# Patient Record
Sex: MALE | Race: WHITE | NOT HISPANIC OR LATINO | Employment: FULL TIME | URBAN - METROPOLITAN AREA
[De-identification: names, ages, dates, MRNs, and addresses within clinical notes are randomized per-mention and may not be internally consistent; named-entity substitution may affect disease eponyms.]

---

## 2021-01-27 ENCOUNTER — TELEPHONE (OUTPATIENT)
Dept: RHEUMATOLOGY | Facility: CLINIC | Age: 51
End: 2021-01-27

## 2021-02-24 ENCOUNTER — OFFICE VISIT (OUTPATIENT)
Dept: RHEUMATOLOGY | Facility: CLINIC | Age: 51
End: 2021-02-24
Payer: COMMERCIAL

## 2021-02-24 ENCOUNTER — HOSPITAL ENCOUNTER (OUTPATIENT)
Dept: RADIOLOGY | Facility: HOSPITAL | Age: 51
Discharge: HOME | End: 2021-02-24
Payer: COMMERCIAL

## 2021-02-24 VITALS — SYSTOLIC BLOOD PRESSURE: 110 MMHG | HEART RATE: 94 BPM | WEIGHT: 258.63 LBS | DIASTOLIC BLOOD PRESSURE: 74 MMHG

## 2021-02-24 DIAGNOSIS — M25.50 ARTHRALGIA, UNSPECIFIED JOINT: Primary | ICD-10-CM

## 2021-02-24 DIAGNOSIS — M25.50 ARTHRALGIA, UNSPECIFIED JOINT: ICD-10-CM

## 2021-02-24 DIAGNOSIS — M06.9 RHEUMATOID ARTHRITIS, INVOLVING UNSPECIFIED SITE, UNSPECIFIED WHETHER RHEUMATOID FACTOR PRESENT: ICD-10-CM

## 2021-02-24 PROBLEM — E78.5 DYSLIPIDEMIA: Status: ACTIVE | Noted: 2020-05-20

## 2021-02-24 PROBLEM — I10 ESSENTIAL HYPERTENSION: Status: ACTIVE | Noted: 2020-05-20

## 2021-02-24 PROCEDURE — 73560 X-RAY EXAM OF KNEE 1 OR 2: CPT | Mod: TC,50

## 2021-02-24 PROCEDURE — 73070 XR ELBOW 2 VIEWS LEFT: ICD-10-PCS | Mod: 26,LT,, | Performed by: RADIOLOGY

## 2021-02-24 PROCEDURE — 73560 X-RAY EXAM OF KNEE 1 OR 2: CPT | Mod: 26,,, | Performed by: RADIOLOGY

## 2021-02-24 PROCEDURE — 99204 PR OFFICE/OUTPT VISIT, NEW, LEVL IV, 45-59 MIN: ICD-10-PCS | Mod: S$GLB,,,

## 2021-02-24 PROCEDURE — 99204 OFFICE O/P NEW MOD 45 MIN: CPT | Mod: S$GLB,,,

## 2021-02-24 PROCEDURE — 73560 XR KNEE 1 OR 2 VIEW BILATERAL: ICD-10-PCS | Mod: 26,,, | Performed by: RADIOLOGY

## 2021-02-24 PROCEDURE — 73030 X-RAY EXAM OF SHOULDER: CPT | Mod: 26,,, | Performed by: RADIOLOGY

## 2021-02-24 PROCEDURE — 73130 X-RAY EXAM OF HAND: CPT | Mod: TC,50

## 2021-02-24 PROCEDURE — 73130 XR HAND COMPLETE 3 VIEWS BILATERAL: ICD-10-PCS | Mod: 26,,, | Performed by: RADIOLOGY

## 2021-02-24 PROCEDURE — 99999 PR PBB SHADOW E&M-EST. PATIENT-LVL III: CPT | Mod: PBBFAC,,,

## 2021-02-24 PROCEDURE — 73030 X-RAY EXAM OF SHOULDER: CPT | Mod: TC,50

## 2021-02-24 PROCEDURE — 99999 PR PBB SHADOW E&M-EST. PATIENT-LVL III: ICD-10-PCS | Mod: PBBFAC,,,

## 2021-02-24 PROCEDURE — 73130 X-RAY EXAM OF HAND: CPT | Mod: 26,,, | Performed by: RADIOLOGY

## 2021-02-24 PROCEDURE — 73070 X-RAY EXAM OF ELBOW: CPT | Mod: TC,LT

## 2021-02-24 PROCEDURE — 73070 X-RAY EXAM OF ELBOW: CPT | Mod: 26,LT,, | Performed by: RADIOLOGY

## 2021-02-24 PROCEDURE — 73030 XR SHOULDER COMPLETE 2 OR MORE VIEWS BILATERAL: ICD-10-PCS | Mod: 26,,, | Performed by: RADIOLOGY

## 2021-02-24 RX ORDER — PREDNISONE 10 MG/1
10 TABLET ORAL DAILY
Qty: 60 TABLET | Refills: 1 | Status: SHIPPED | OUTPATIENT
Start: 2021-02-24 | End: 2021-07-05 | Stop reason: SDUPTHER

## 2021-02-24 RX ORDER — VENLAFAXINE 37.5 MG/1
37.5 TABLET ORAL
COMMUNITY

## 2021-02-24 RX ORDER — ATORVASTATIN CALCIUM 20 MG/1
TABLET, FILM COATED ORAL
COMMUNITY

## 2021-02-24 RX ORDER — TELMISARTAN AND HYDROCHLORTHIAZIDE 80; 25 MG/1; MG/1
TABLET ORAL
COMMUNITY

## 2021-02-24 RX ORDER — AMLODIPINE BESYLATE 5 MG/1
TABLET ORAL
COMMUNITY
End: 2023-02-15 | Stop reason: DRUGHIGH

## 2021-02-24 ASSESSMENT — ROUTINE ASSESSMENT OF PATIENT INDEX DATA (RAPID3)
PATIENT GLOBAL ASSESSMENT SCORE: 5
TOTAL RAPID3 SCORE: 5.22
PAIN SCORE: 7
PSYCHOLOGICAL DISTRESS SCORE: 4.4
MDHAQ FUNCTION SCORE: 1.1
FATIGUE SCORE: 7
AM STIFFNESS SCORE: 1, YES

## 2021-02-26 ENCOUNTER — LAB VISIT (OUTPATIENT)
Dept: LAB | Facility: HOSPITAL | Age: 51
End: 2021-02-26
Payer: COMMERCIAL

## 2021-02-26 ENCOUNTER — PATIENT MESSAGE (OUTPATIENT)
Dept: RHEUMATOLOGY | Facility: CLINIC | Age: 51
End: 2021-02-26

## 2021-02-26 DIAGNOSIS — M25.50 ARTHRALGIA, UNSPECIFIED JOINT: Primary | ICD-10-CM

## 2021-02-26 DIAGNOSIS — M25.50 ARTHRALGIA, UNSPECIFIED JOINT: ICD-10-CM

## 2021-02-26 LAB
ALBUMIN SERPL BCP-MCNC: 4.8 G/DL (ref 3.5–5.2)
ALP SERPL-CCNC: 61 U/L (ref 55–135)
ALT SERPL W/O P-5'-P-CCNC: 28 U/L (ref 10–44)
ANION GAP SERPL CALC-SCNC: 11 MMOL/L (ref 8–16)
AST SERPL-CCNC: 27 U/L (ref 10–40)
BASOPHILS # BLD AUTO: 0.05 K/UL (ref 0–0.2)
BASOPHILS NFR BLD: 0.6 % (ref 0–1.9)
BILIRUB SERPL-MCNC: 1.3 MG/DL (ref 0.1–1)
BUN SERPL-MCNC: 13 MG/DL (ref 6–20)
CALCIUM SERPL-MCNC: 9.6 MG/DL (ref 8.7–10.5)
CHLORIDE SERPL-SCNC: 101 MMOL/L (ref 95–110)
CK SERPL-CCNC: 239 U/L (ref 20–200)
CO2 SERPL-SCNC: 24 MMOL/L (ref 23–29)
CREAT SERPL-MCNC: 0.7 MG/DL (ref 0.5–1.4)
CRP SERPL-MCNC: 0.21 MG/DL (ref 0–0.75)
DIFFERENTIAL METHOD: NORMAL
EOSINOPHIL # BLD AUTO: 0.2 K/UL (ref 0–0.5)
EOSINOPHIL NFR BLD: 2.2 % (ref 0–8)
ERYTHROCYTE [DISTWIDTH] IN BLOOD BY AUTOMATED COUNT: 13.1 % (ref 11.5–14.5)
ERYTHROCYTE [SEDIMENTATION RATE] IN BLOOD BY WESTERGREN METHOD: 3 MM/HR (ref 0–10)
EST. GFR  (AFRICAN AMERICAN): >60 ML/MIN/1.73 M^2
EST. GFR  (NON AFRICAN AMERICAN): >60 ML/MIN/1.73 M^2
GLUCOSE SERPL-MCNC: 129 MG/DL (ref 70–110)
HCT VFR BLD AUTO: 46.6 % (ref 40–54)
HGB BLD-MCNC: 15.8 G/DL (ref 14–18)
IMM GRANULOCYTES # BLD AUTO: 0.02 K/UL (ref 0–0.04)
IMM GRANULOCYTES NFR BLD AUTO: 0.2 % (ref 0–0.5)
LYMPHOCYTES # BLD AUTO: 1.8 K/UL (ref 1–4.8)
LYMPHOCYTES NFR BLD: 22 % (ref 18–48)
MCH RBC QN AUTO: 30 PG (ref 27–31)
MCHC RBC AUTO-ENTMCNC: 33.9 G/DL (ref 32–36)
MCV RBC AUTO: 88 FL (ref 82–98)
MONOCYTES # BLD AUTO: 0.7 K/UL (ref 0.3–1)
MONOCYTES NFR BLD: 8.9 % (ref 4–15)
NEUTROPHILS # BLD AUTO: 5.5 K/UL (ref 1.8–7.7)
NEUTROPHILS NFR BLD: 66.1 % (ref 38–73)
NRBC BLD-RTO: 0 /100 WBC
PLATELET # BLD AUTO: 244 K/UL (ref 150–350)
PMV BLD AUTO: 11.5 FL (ref 9.2–12.9)
POTASSIUM SERPL-SCNC: 3.5 MMOL/L (ref 3.5–5.1)
PROT SERPL-MCNC: 8.1 G/DL (ref 6–8.4)
RBC # BLD AUTO: 5.27 M/UL (ref 4.6–6.2)
SODIUM SERPL-SCNC: 136 MMOL/L (ref 136–145)
WBC # BLD AUTO: 8.32 K/UL (ref 3.9–12.7)

## 2021-02-26 PROCEDURE — 86803 HEPATITIS C AB TEST: CPT

## 2021-02-26 PROCEDURE — 86703 HIV-1/HIV-2 1 RESULT ANTBDY: CPT

## 2021-02-26 PROCEDURE — 86038 ANTINUCLEAR ANTIBODIES: CPT

## 2021-02-26 PROCEDURE — 87340 HEPATITIS B SURFACE AG IA: CPT

## 2021-02-26 PROCEDURE — 86592 SYPHILIS TEST NON-TREP QUAL: CPT

## 2021-02-26 PROCEDURE — 86140 C-REACTIVE PROTEIN: CPT

## 2021-02-26 PROCEDURE — 86790 VIRUS ANTIBODY NOS: CPT

## 2021-02-26 PROCEDURE — 86706 HEP B SURFACE ANTIBODY: CPT

## 2021-02-26 PROCEDURE — 86682 HELMINTH ANTIBODY: CPT

## 2021-02-26 PROCEDURE — 86225 DNA ANTIBODY NATIVE: CPT

## 2021-02-26 PROCEDURE — 86704 HEP B CORE ANTIBODY TOTAL: CPT

## 2021-02-26 PROCEDURE — 86431 RHEUMATOID FACTOR QUANT: CPT

## 2021-02-26 PROCEDURE — 86039 ANTINUCLEAR ANTIBODIES (ANA): CPT

## 2021-02-26 PROCEDURE — 80053 COMPREHEN METABOLIC PANEL: CPT

## 2021-02-26 PROCEDURE — 85025 COMPLETE CBC W/AUTO DIFF WBC: CPT

## 2021-02-26 PROCEDURE — 86200 CCP ANTIBODY: CPT

## 2021-02-26 PROCEDURE — 82085 ASSAY OF ALDOLASE: CPT

## 2021-02-26 PROCEDURE — 86787 VARICELLA-ZOSTER ANTIBODY: CPT

## 2021-02-26 PROCEDURE — 86235 NUCLEAR ANTIGEN ANTIBODY: CPT | Mod: 59

## 2021-02-26 PROCEDURE — 85651 RBC SED RATE NONAUTOMATED: CPT

## 2021-02-26 PROCEDURE — 86160 COMPLEMENT ANTIGEN: CPT

## 2021-02-26 PROCEDURE — 86160 COMPLEMENT ANTIGEN: CPT | Mod: 59

## 2021-02-26 PROCEDURE — 82550 ASSAY OF CK (CPK): CPT

## 2021-02-27 LAB
C3 SERPL-MCNC: 140 MG/DL (ref 50–180)
C4 SERPL-MCNC: 29 MG/DL (ref 11–44)
CCP AB SER IA-ACNC: <0.5 U/ML
RHEUMATOID FACT SERPL-ACNC: <10 IU/ML (ref 0–15)
RPR SER QL: NORMAL
VARICELLA INTERPRETATION: POSITIVE
VARICELLA ZOSTER IGG: 3.84 ISR (ref 0–0.9)

## 2021-03-01 LAB
ANA PATTERN 1: NORMAL
ANA SER QL IF: POSITIVE
ANA TITR SER IF: NORMAL {TITER}
DSDNA AB SER-ACNC: NORMAL [IU]/ML

## 2021-03-02 LAB
HBV CORE AB SERPL QL IA: NEGATIVE
HBV SURFACE AB SER-ACNC: NEGATIVE M[IU]/ML
HBV SURFACE AG SERPL QL IA: NEGATIVE
HCV AB SERPL QL IA: NEGATIVE
HEPATITIS A ANTIBODY, IGG: NEGATIVE
STRONGYLOIDES ANTIBODY IGG: POSITIVE

## 2021-03-03 LAB
ALDOLASE SERPL-CCNC: 4.8 U/L (ref 1.2–7.6)
ANTI SM ANTIBODY: 0.13 RATIO (ref 0–0.99)
ANTI SM/RNP ANTIBODY: 0.12 RATIO (ref 0–0.99)
ANTI-SM INTERPRETATION: NEGATIVE
ANTI-SM/RNP INTERPRETATION: NEGATIVE
ANTI-SSA ANTIBODY: 0.08 RATIO (ref 0–0.99)
ANTI-SSA INTERPRETATION: NEGATIVE
ANTI-SSB ANTIBODY: 0.3 RATIO (ref 0–0.99)
ANTI-SSB INTERPRETATION: NEGATIVE
DSDNA AB SER-ACNC: NORMAL [IU]/ML
HIV 1+2 AB+HIV1 P24 AG SERPL QL IA: NEGATIVE

## 2021-03-08 ENCOUNTER — PATIENT MESSAGE (OUTPATIENT)
Dept: RHEUMATOLOGY | Facility: CLINIC | Age: 51
End: 2021-03-08

## 2021-03-12 ENCOUNTER — HOSPITAL ENCOUNTER (OUTPATIENT)
Dept: RADIOLOGY | Facility: HOSPITAL | Age: 51
Discharge: HOME | End: 2021-03-12
Payer: COMMERCIAL

## 2021-03-12 ENCOUNTER — OFFICE VISIT (OUTPATIENT)
Dept: RHEUMATOLOGY | Facility: CLINIC | Age: 51
End: 2021-03-12
Payer: COMMERCIAL

## 2021-03-12 VITALS
HEART RATE: 97 BPM | HEIGHT: 66 IN | BODY MASS INDEX: 40.89 KG/M2 | SYSTOLIC BLOOD PRESSURE: 127 MMHG | WEIGHT: 254.44 LBS | DIASTOLIC BLOOD PRESSURE: 86 MMHG

## 2021-03-12 DIAGNOSIS — M06.9 RHEUMATOID ARTHRITIS, INVOLVING UNSPECIFIED SITE, UNSPECIFIED WHETHER RHEUMATOID FACTOR PRESENT: ICD-10-CM

## 2021-03-12 PROCEDURE — 99999 PR PBB SHADOW E&M-EST. PATIENT-LVL IV: CPT | Mod: PBBFAC,,,

## 2021-03-12 PROCEDURE — 71046 X-RAY EXAM CHEST 2 VIEWS: CPT | Mod: 26,,, | Performed by: RADIOLOGY

## 2021-03-12 PROCEDURE — 71046 X-RAY EXAM CHEST 2 VIEWS: CPT | Mod: TC,FY

## 2021-03-12 PROCEDURE — 99214 PR OFFICE/OUTPT VISIT, EST, LEVL IV, 30-39 MIN: ICD-10-PCS | Mod: S$GLB,,,

## 2021-03-12 PROCEDURE — 99999 PR PBB SHADOW E&M-EST. PATIENT-LVL IV: ICD-10-PCS | Mod: PBBFAC,,,

## 2021-03-12 PROCEDURE — 99214 OFFICE O/P EST MOD 30 MIN: CPT | Mod: S$GLB,,,

## 2021-03-12 PROCEDURE — 71046 XR CHEST PA AND LATERAL: ICD-10-PCS | Mod: 26,,, | Performed by: RADIOLOGY

## 2021-03-12 RX ORDER — METHOTREXATE 2.5 MG/1
10 TABLET ORAL
Qty: 16 TABLET | Refills: 3 | Status: SHIPPED | OUTPATIENT
Start: 2021-03-12 | End: 2021-04-29

## 2021-03-12 RX ORDER — IVERMECTIN 3 MG/1
200 TABLET ORAL ONCE
Qty: 8 TABLET | Refills: 0 | Status: SHIPPED | OUTPATIENT
Start: 2021-03-12 | End: 2021-03-12

## 2021-03-12 RX ORDER — FOLIC ACID 1 MG/1
1 TABLET ORAL DAILY
Qty: 90 TABLET | Refills: 3 | Status: SHIPPED | OUTPATIENT
Start: 2021-03-12 | End: 2022-03-24 | Stop reason: SDUPTHER

## 2021-03-12 RX ORDER — FLUTICASONE PROPIONATE 50 MCG
SPRAY, SUSPENSION (ML) NASAL
COMMUNITY

## 2021-03-12 ASSESSMENT — ROUTINE ASSESSMENT OF PATIENT INDEX DATA (RAPID3)
TOTAL RAPID3 SCORE: 4.5
PAIN SCORE: 5.5
WHEN YOU AWAKENED IN THE MORNING OVER THE LAST WEEK, PLEASE INDICATE THE AMOUNT OF TIME IT TAKES UNTIL YOU ARE AS LIMBER AS YOU WILL BE FOR THE DAY: 30 MINUTES
PSYCHOLOGICAL DISTRESS SCORE: 3.3
FATIGUE SCORE: 7
MDHAQ FUNCTION SCORE: 0.9
AM STIFFNESS SCORE: 1, YES
PATIENT GLOBAL ASSESSMENT SCORE: 5

## 2021-03-29 ENCOUNTER — PATIENT MESSAGE (OUTPATIENT)
Dept: RHEUMATOLOGY | Facility: CLINIC | Age: 51
End: 2021-03-29

## 2021-03-30 ENCOUNTER — PATIENT MESSAGE (OUTPATIENT)
Dept: RHEUMATOLOGY | Facility: CLINIC | Age: 51
End: 2021-03-30

## 2021-03-30 ENCOUNTER — TELEPHONE (OUTPATIENT)
Dept: RHEUMATOLOGY | Facility: CLINIC | Age: 51
End: 2021-03-30

## 2021-03-30 DIAGNOSIS — M60.9 MYOSITIS, UNSPECIFIED MYOSITIS TYPE, UNSPECIFIED SITE: ICD-10-CM

## 2021-04-12 ENCOUNTER — LAB VISIT (OUTPATIENT)
Dept: FAMILY MEDICINE | Facility: CLINIC | Age: 51
End: 2021-04-12
Payer: COMMERCIAL

## 2021-04-12 DIAGNOSIS — M60.9 MYOSITIS, UNSPECIFIED MYOSITIS TYPE, UNSPECIFIED SITE: ICD-10-CM

## 2021-04-12 PROCEDURE — U0005 INFEC AGEN DETEC AMPLI PROBE: HCPCS

## 2021-04-12 PROCEDURE — U0003 INFECTIOUS AGENT DETECTION BY NUCLEIC ACID (DNA OR RNA); SEVERE ACUTE RESPIRATORY SYNDROME CORONAVIRUS 2 (SARS-COV-2) (CORONAVIRUS DISEASE [COVID-19]), AMPLIFIED PROBE TECHNIQUE, MAKING USE OF HIGH THROUGHPUT TECHNOLOGIES AS DESCRIBED BY CMS-2020-01-R: HCPCS

## 2021-04-13 LAB — SARS-COV-2 RNA RESP QL NAA+PROBE: NOT DETECTED

## 2021-04-14 ENCOUNTER — HOSPITAL ENCOUNTER (OUTPATIENT)
Dept: RADIOLOGY | Facility: HOSPITAL | Age: 51
Discharge: HOME | End: 2021-04-14
Payer: COMMERCIAL

## 2021-04-14 ENCOUNTER — HOSPITAL ENCOUNTER (OUTPATIENT)
Dept: PULMONOLOGY | Facility: CLINIC | Age: 51
Discharge: HOME | End: 2021-04-14
Payer: COMMERCIAL

## 2021-04-14 DIAGNOSIS — M60.9 MYOSITIS, UNSPECIFIED MYOSITIS TYPE, UNSPECIFIED SITE: ICD-10-CM

## 2021-04-14 PROCEDURE — 94729 PR C02/MEMBANE DIFFUSE CAPACITY: ICD-10-PCS | Mod: S$GLB,,, | Performed by: INTERNAL MEDICINE

## 2021-04-14 PROCEDURE — 94010 BREATHING CAPACITY TEST: CPT | Mod: S$GLB,,, | Performed by: INTERNAL MEDICINE

## 2021-04-14 PROCEDURE — 71250 CT CHEST WITHOUT CONTRAST: ICD-10-PCS | Mod: 26,,, | Performed by: RADIOLOGY

## 2021-04-14 PROCEDURE — 73718 MRI LOWER EXTREMITY W/O DYE: CPT | Mod: 26,RT,, | Performed by: RADIOLOGY

## 2021-04-14 PROCEDURE — 73718 MRI FEMUR WITHOUT CONTRAST LEFT: ICD-10-PCS | Mod: 26,LT,, | Performed by: RADIOLOGY

## 2021-04-14 PROCEDURE — 71250 CT THORAX DX C-: CPT | Mod: TC

## 2021-04-14 PROCEDURE — 73718 MRI LOWER EXTREMITY W/O DYE: CPT | Mod: TC,RT

## 2021-04-14 PROCEDURE — 94010 BREATHING CAPACITY TEST: ICD-10-PCS | Mod: S$GLB,,, | Performed by: INTERNAL MEDICINE

## 2021-04-14 PROCEDURE — 94727 PR PULM FUNCTION TEST BY GAS: ICD-10-PCS | Mod: S$GLB,,, | Performed by: INTERNAL MEDICINE

## 2021-04-14 PROCEDURE — 71250 CT THORAX DX C-: CPT | Mod: 26,,, | Performed by: RADIOLOGY

## 2021-04-14 PROCEDURE — 94729 DIFFUSING CAPACITY: CPT | Mod: S$GLB,,, | Performed by: INTERNAL MEDICINE

## 2021-04-14 PROCEDURE — 73718 MRI FEMUR WITHOUT CONTRAST RIGHT: ICD-10-PCS | Mod: 26,RT,, | Performed by: RADIOLOGY

## 2021-04-14 PROCEDURE — 94727 GAS DIL/WSHOT DETER LNG VOL: CPT | Mod: S$GLB,,, | Performed by: INTERNAL MEDICINE

## 2021-04-14 PROCEDURE — 73718 MRI LOWER EXTREMITY W/O DYE: CPT | Mod: 26,LT,, | Performed by: RADIOLOGY

## 2021-04-14 PROCEDURE — 73718 MRI LOWER EXTREMITY W/O DYE: CPT | Mod: TC,LT

## 2021-04-15 ENCOUNTER — PATIENT MESSAGE (OUTPATIENT)
Dept: RHEUMATOLOGY | Facility: CLINIC | Age: 51
End: 2021-04-15

## 2021-04-15 DIAGNOSIS — M25.50 ARTHRALGIA, UNSPECIFIED JOINT: ICD-10-CM

## 2021-04-15 DIAGNOSIS — M06.9 RHEUMATOID ARTHRITIS, INVOLVING UNSPECIFIED SITE, UNSPECIFIED WHETHER RHEUMATOID FACTOR PRESENT: ICD-10-CM

## 2021-04-15 DIAGNOSIS — M60.9 MYOSITIS, UNSPECIFIED MYOSITIS TYPE, UNSPECIFIED SITE: Primary | ICD-10-CM

## 2021-04-21 ENCOUNTER — TELEPHONE (OUTPATIENT)
Dept: RHEUMATOLOGY | Facility: CLINIC | Age: 51
End: 2021-04-21

## 2021-05-10 ENCOUNTER — PATIENT MESSAGE (OUTPATIENT)
Dept: RHEUMATOLOGY | Facility: CLINIC | Age: 51
End: 2021-05-10

## 2021-05-20 ENCOUNTER — TELEPHONE (OUTPATIENT)
Dept: RHEUMATOLOGY | Facility: CLINIC | Age: 51
End: 2021-05-20

## 2021-06-02 ENCOUNTER — PATIENT MESSAGE (OUTPATIENT)
Dept: RHEUMATOLOGY | Facility: CLINIC | Age: 51
End: 2021-06-02

## 2021-06-11 ENCOUNTER — OFFICE VISIT (OUTPATIENT)
Dept: RHEUMATOLOGY | Facility: CLINIC | Age: 51
End: 2021-06-11
Payer: COMMERCIAL

## 2021-06-11 ENCOUNTER — LAB VISIT (OUTPATIENT)
Dept: LAB | Facility: HOSPITAL | Age: 51
End: 2021-06-11
Payer: COMMERCIAL

## 2021-06-11 VITALS
SYSTOLIC BLOOD PRESSURE: 121 MMHG | DIASTOLIC BLOOD PRESSURE: 79 MMHG | WEIGHT: 260 LBS | HEART RATE: 97 BPM | BODY MASS INDEX: 40.81 KG/M2 | HEIGHT: 67 IN

## 2021-06-11 DIAGNOSIS — M06.9 RHEUMATOID ARTHRITIS, INVOLVING UNSPECIFIED SITE, UNSPECIFIED WHETHER RHEUMATOID FACTOR PRESENT: ICD-10-CM

## 2021-06-11 DIAGNOSIS — M13.0 POLYARTHRITIS: ICD-10-CM

## 2021-06-11 DIAGNOSIS — M13.0 POLYARTHRITIS: Primary | ICD-10-CM

## 2021-06-11 LAB
ALBUMIN SERPL BCP-MCNC: 4.7 G/DL (ref 3.5–5.2)
ALP SERPL-CCNC: 77 U/L (ref 55–135)
ALT SERPL W/O P-5'-P-CCNC: 30 U/L (ref 10–44)
ANION GAP SERPL CALC-SCNC: 15 MMOL/L (ref 8–16)
AST SERPL-CCNC: 22 U/L (ref 10–40)
BASOPHILS # BLD AUTO: 0.04 K/UL (ref 0–0.2)
BASOPHILS NFR BLD: 0.4 % (ref 0–1.9)
BILIRUB SERPL-MCNC: 1.3 MG/DL (ref 0.1–1)
BUN SERPL-MCNC: 11 MG/DL (ref 6–20)
C3 SERPL-MCNC: 140 MG/DL (ref 50–180)
C4 SERPL-MCNC: 29 MG/DL (ref 11–44)
CALCIUM SERPL-MCNC: 10.3 MG/DL (ref 8.7–10.5)
CHLORIDE SERPL-SCNC: 103 MMOL/L (ref 95–110)
CK SERPL-CCNC: 181 U/L (ref 20–200)
CO2 SERPL-SCNC: 21 MMOL/L (ref 23–29)
CREAT SERPL-MCNC: 1 MG/DL (ref 0.5–1.4)
CRP SERPL-MCNC: 3.5 MG/L (ref 0–8.2)
DAT IGG-SP REAG RBC-IMP: NORMAL
DIFFERENTIAL METHOD: ABNORMAL
EOSINOPHIL # BLD AUTO: 0 K/UL (ref 0–0.5)
EOSINOPHIL NFR BLD: 0.1 % (ref 0–8)
ERYTHROCYTE [DISTWIDTH] IN BLOOD BY AUTOMATED COUNT: 14 % (ref 11.5–14.5)
ERYTHROCYTE [SEDIMENTATION RATE] IN BLOOD BY WESTERGREN METHOD: 26 MM/HR (ref 0–23)
EST. GFR  (AFRICAN AMERICAN): >60 ML/MIN/1.73 M^2
EST. GFR  (NON AFRICAN AMERICAN): >60 ML/MIN/1.73 M^2
GLUCOSE SERPL-MCNC: 132 MG/DL (ref 70–110)
HCT VFR BLD AUTO: 48.2 % (ref 40–54)
HGB BLD-MCNC: 16 G/DL (ref 14–18)
IMM GRANULOCYTES # BLD AUTO: 0.04 K/UL (ref 0–0.04)
IMM GRANULOCYTES NFR BLD AUTO: 0.4 % (ref 0–0.5)
LYMPHOCYTES # BLD AUTO: 0.8 K/UL (ref 1–4.8)
LYMPHOCYTES NFR BLD: 6.9 % (ref 18–48)
MCH RBC QN AUTO: 29.9 PG (ref 27–31)
MCHC RBC AUTO-ENTMCNC: 33.2 G/DL (ref 32–36)
MCV RBC AUTO: 90 FL (ref 82–98)
MONOCYTES # BLD AUTO: 0.7 K/UL (ref 0.3–1)
MONOCYTES NFR BLD: 6.3 % (ref 4–15)
NEUTROPHILS # BLD AUTO: 9.3 K/UL (ref 1.8–7.7)
NEUTROPHILS NFR BLD: 85.9 % (ref 38–73)
NRBC BLD-RTO: 0 /100 WBC
PLATELET # BLD AUTO: 245 K/UL (ref 150–450)
PMV BLD AUTO: 11.8 FL (ref 9.2–12.9)
POTASSIUM SERPL-SCNC: 3.9 MMOL/L (ref 3.5–5.1)
PROT SERPL-MCNC: 8.1 G/DL (ref 6–8.4)
RBC # BLD AUTO: 5.35 M/UL (ref 4.6–6.2)
SODIUM SERPL-SCNC: 139 MMOL/L (ref 136–145)
WBC # BLD AUTO: 10.81 K/UL (ref 3.9–12.7)

## 2021-06-11 PROCEDURE — 80053 COMPREHEN METABOLIC PANEL: CPT

## 2021-06-11 PROCEDURE — 99214 OFFICE O/P EST MOD 30 MIN: CPT | Mod: 25,S$GLB,, | Performed by: INTERNAL MEDICINE

## 2021-06-11 PROCEDURE — 99214 PR OFFICE/OUTPT VISIT, EST, LEVL IV, 30-39 MIN: ICD-10-PCS | Mod: 25,S$GLB,, | Performed by: INTERNAL MEDICINE

## 2021-06-11 PROCEDURE — 36415 COLL VENOUS BLD VENIPUNCTURE: CPT

## 2021-06-11 PROCEDURE — 86140 C-REACTIVE PROTEIN: CPT

## 2021-06-11 PROCEDURE — 82550 ASSAY OF CK (CPK): CPT

## 2021-06-11 PROCEDURE — 82085 ASSAY OF ALDOLASE: CPT

## 2021-06-11 PROCEDURE — 86160 COMPLEMENT ANTIGEN: CPT

## 2021-06-11 PROCEDURE — 86160 COMPLEMENT ANTIGEN: CPT | Mod: 59

## 2021-06-11 PROCEDURE — 99999 PR PBB SHADOW E&M-EST. PATIENT-LVL III: CPT | Mod: PBBFAC,,,

## 2021-06-11 PROCEDURE — 96372 PR INJECTION,THERAP/PROPH/DIAG2ST, IM OR SUBCUT: ICD-10-PCS | Mod: S$GLB,,, | Performed by: INTERNAL MEDICINE

## 2021-06-11 PROCEDURE — 85613 RUSSELL VIPER VENOM DILUTED: CPT

## 2021-06-11 PROCEDURE — 86880 COOMBS TEST DIRECT: CPT

## 2021-06-11 PROCEDURE — 85652 RBC SED RATE AUTOMATED: CPT

## 2021-06-11 PROCEDURE — 86147 CARDIOLIPIN ANTIBODY EA IG: CPT

## 2021-06-11 PROCEDURE — 86146 BETA-2 GLYCOPROTEIN ANTIBODY: CPT | Mod: 59

## 2021-06-11 PROCEDURE — 96372 THER/PROPH/DIAG INJ SC/IM: CPT | Mod: S$GLB,,, | Performed by: INTERNAL MEDICINE

## 2021-06-11 PROCEDURE — 85025 COMPLETE CBC W/AUTO DIFF WBC: CPT

## 2021-06-11 PROCEDURE — 99999 PR PBB SHADOW E&M-EST. PATIENT-LVL III: ICD-10-PCS | Mod: PBBFAC,,,

## 2021-06-11 PROCEDURE — 86225 DNA ANTIBODY NATIVE: CPT

## 2021-06-11 RX ORDER — METHOTREXATE 2.5 MG/1
TABLET ORAL
Qty: 52 TABLET | Refills: 0 | Status: SHIPPED | OUTPATIENT
Start: 2021-06-11 | End: 2021-07-15

## 2021-06-11 RX ORDER — TRIAMCINOLONE ACETONIDE 40 MG/ML
40 INJECTION, SUSPENSION INTRA-ARTICULAR; INTRAMUSCULAR ONCE
Status: COMPLETED | OUTPATIENT
Start: 2021-06-11 | End: 2021-06-11

## 2021-06-11 RX ADMIN — TRIAMCINOLONE ACETONIDE 40 MG: 40 INJECTION, SUSPENSION INTRA-ARTICULAR; INTRAMUSCULAR at 10:06

## 2021-06-11 ASSESSMENT — ROUTINE ASSESSMENT OF PATIENT INDEX DATA (RAPID3)
PATIENT GLOBAL ASSESSMENT SCORE: 5
WHEN YOU AWAKENED IN THE MORNING OVER THE LAST WEEK, PLEASE INDICATE THE AMOUNT OF TIME IT TAKES UNTIL YOU ARE AS LIMBER AS YOU WILL BE FOR THE DAY: 1
TOTAL RAPID3 SCORE: 4.89
FATIGUE SCORE: 5
PSYCHOLOGICAL DISTRESS SCORE: 6.6
MDHAQ FUNCTION SCORE: 1.4
PAIN SCORE: 5
AM STIFFNESS SCORE: 1, YES

## 2021-06-14 LAB
ALDOLASE SERPL-CCNC: 2.7 U/L (ref 1.2–7.6)
CARDIOLIPIN IGG SER IA-ACNC: <9.4 GPL (ref 0–14.99)
CARDIOLIPIN IGM SER IA-ACNC: <9.4 MPL (ref 0–12.49)
DSDNA AB SER-ACNC: NORMAL [IU]/ML

## 2021-06-15 LAB
APTT IMM NP PPP: NORMAL SEC (ref 32–48)
APTT P HEP NEUT PPP: NORMAL SEC (ref 32–48)
B2 GLYCOPROT1 IGA SER QL: <9 SAU
B2 GLYCOPROT1 IGG SER QL: <9 SGU
B2 GLYCOPROT1 IGM SER QL: <9 SMU
CONFIRM APTT STACLOT: NORMAL
DRVVT SCREEN TO CONFIRM RATIO: NORMAL RATIO
LA 3 SCREEN W REFLEX-IMP: NORMAL
LA NT DPL PPP QL: NORMAL
MIXING DRVVT: NORMAL SEC (ref 33–44)
PROTHROMBIN TIME: 12.6 SEC (ref 12–15.5)
REPTILASE TIME: NORMAL SEC
SCREEN APTT: 41 SEC (ref 32–48)
SCREEN DRVVT: 34 SEC (ref 33–44)
THROMBIN TIME: NORMAL SEC (ref 14.7–19.5)

## 2021-07-06 RX ORDER — PREDNISONE 10 MG/1
10 TABLET ORAL DAILY
Qty: 60 TABLET | Refills: 1 | Status: SHIPPED | OUTPATIENT
Start: 2021-07-06 | End: 2021-07-21

## 2021-07-21 ENCOUNTER — OFFICE VISIT (OUTPATIENT)
Dept: RHEUMATOLOGY | Facility: CLINIC | Age: 51
End: 2021-07-21
Payer: COMMERCIAL

## 2021-07-21 ENCOUNTER — LAB VISIT (OUTPATIENT)
Dept: LAB | Facility: HOSPITAL | Age: 51
End: 2021-07-21
Payer: COMMERCIAL

## 2021-07-21 VITALS
SYSTOLIC BLOOD PRESSURE: 115 MMHG | DIASTOLIC BLOOD PRESSURE: 76 MMHG | WEIGHT: 260.13 LBS | BODY MASS INDEX: 40.5 KG/M2 | HEART RATE: 93 BPM

## 2021-07-21 DIAGNOSIS — M13.0 POLYARTHRITIS: ICD-10-CM

## 2021-07-21 LAB
ALBUMIN SERPL BCP-MCNC: 4.6 G/DL (ref 3.5–5.2)
ALP SERPL-CCNC: 83 U/L (ref 55–135)
ALT SERPL W/O P-5'-P-CCNC: 35 U/L (ref 10–44)
ANION GAP SERPL CALC-SCNC: 13 MMOL/L (ref 8–16)
AST SERPL-CCNC: 25 U/L (ref 10–40)
BASOPHILS # BLD AUTO: 0.03 K/UL (ref 0–0.2)
BASOPHILS NFR BLD: 0.2 % (ref 0–1.9)
BILIRUB SERPL-MCNC: 1.4 MG/DL (ref 0.1–1)
BUN SERPL-MCNC: 14 MG/DL (ref 6–20)
CALCIUM SERPL-MCNC: 10.3 MG/DL (ref 8.7–10.5)
CHLORIDE SERPL-SCNC: 100 MMOL/L (ref 95–110)
CK SERPL-CCNC: 174 U/L (ref 20–200)
CO2 SERPL-SCNC: 25 MMOL/L (ref 23–29)
CREAT SERPL-MCNC: 1 MG/DL (ref 0.5–1.4)
CRP SERPL-MCNC: 3 MG/L (ref 0–8.2)
DIFFERENTIAL METHOD: ABNORMAL
EOSINOPHIL # BLD AUTO: 0 K/UL (ref 0–0.5)
EOSINOPHIL NFR BLD: 0.1 % (ref 0–8)
ERYTHROCYTE [DISTWIDTH] IN BLOOD BY AUTOMATED COUNT: 13.8 % (ref 11.5–14.5)
ERYTHROCYTE [SEDIMENTATION RATE] IN BLOOD BY WESTERGREN METHOD: 37 MM/HR (ref 0–23)
EST. GFR  (AFRICAN AMERICAN): >60 ML/MIN/1.73 M^2
EST. GFR  (NON AFRICAN AMERICAN): >60 ML/MIN/1.73 M^2
GLUCOSE SERPL-MCNC: 112 MG/DL (ref 70–110)
HCT VFR BLD AUTO: 47.7 % (ref 40–54)
HGB BLD-MCNC: 16 G/DL (ref 14–18)
IMM GRANULOCYTES # BLD AUTO: 0.05 K/UL (ref 0–0.04)
IMM GRANULOCYTES NFR BLD AUTO: 0.4 % (ref 0–0.5)
LYMPHOCYTES # BLD AUTO: 1.1 K/UL (ref 1–4.8)
LYMPHOCYTES NFR BLD: 8.2 % (ref 18–48)
MCH RBC QN AUTO: 30.7 PG (ref 27–31)
MCHC RBC AUTO-ENTMCNC: 33.5 G/DL (ref 32–36)
MCV RBC AUTO: 92 FL (ref 82–98)
MONOCYTES # BLD AUTO: 0.5 K/UL (ref 0.3–1)
MONOCYTES NFR BLD: 3.8 % (ref 4–15)
NEUTROPHILS # BLD AUTO: 11.9 K/UL (ref 1.8–7.7)
NEUTROPHILS NFR BLD: 87.3 % (ref 38–73)
NRBC BLD-RTO: 0 /100 WBC
PLATELET # BLD AUTO: 293 K/UL (ref 150–450)
PMV BLD AUTO: 11.9 FL (ref 9.2–12.9)
POTASSIUM SERPL-SCNC: 4 MMOL/L (ref 3.5–5.1)
PROT SERPL-MCNC: 8.2 G/DL (ref 6–8.4)
RBC # BLD AUTO: 5.21 M/UL (ref 4.6–6.2)
SODIUM SERPL-SCNC: 138 MMOL/L (ref 136–145)
WBC # BLD AUTO: 13.63 K/UL (ref 3.9–12.7)

## 2021-07-21 PROCEDURE — 85652 RBC SED RATE AUTOMATED: CPT

## 2021-07-21 PROCEDURE — 82085 ASSAY OF ALDOLASE: CPT

## 2021-07-21 PROCEDURE — 99214 OFFICE O/P EST MOD 30 MIN: CPT | Mod: S$GLB,,,

## 2021-07-21 PROCEDURE — 86140 C-REACTIVE PROTEIN: CPT

## 2021-07-21 PROCEDURE — 80053 COMPREHEN METABOLIC PANEL: CPT

## 2021-07-21 PROCEDURE — 85025 COMPLETE CBC W/AUTO DIFF WBC: CPT

## 2021-07-21 PROCEDURE — 36415 COLL VENOUS BLD VENIPUNCTURE: CPT

## 2021-07-21 PROCEDURE — 99999 PR PBB SHADOW E&M-EST. PATIENT-LVL III: ICD-10-PCS | Mod: PBBFAC,,,

## 2021-07-21 PROCEDURE — 99214 PR OFFICE/OUTPT VISIT, EST, LEVL IV, 30-39 MIN: ICD-10-PCS | Mod: S$GLB,,,

## 2021-07-21 PROCEDURE — 30000890 MISCELLANEOUS SENDOUT TEST, BLOOD

## 2021-07-21 PROCEDURE — 83516 IMMUNOASSAY NONANTIBODY: CPT

## 2021-07-21 PROCEDURE — 82550 ASSAY OF CK (CPK): CPT

## 2021-07-21 PROCEDURE — 99999 PR PBB SHADOW E&M-EST. PATIENT-LVL III: CPT | Mod: PBBFAC,,,

## 2021-07-21 RX ORDER — IVERMECTIN 3 MG/1
TABLET ORAL
COMMUNITY
Start: 2021-03-12 | End: 2022-01-26

## 2021-07-21 RX ORDER — PREDNISONE 2.5 MG/1
TABLET ORAL
Qty: 90 TABLET | Refills: 0 | Status: SHIPPED | OUTPATIENT
Start: 2021-07-21 | End: 2021-10-27 | Stop reason: SDUPTHER

## 2021-07-21 RX ORDER — SERTRALINE HYDROCHLORIDE 50 MG/1
50 TABLET, FILM COATED ORAL DAILY
COMMUNITY
Start: 2021-05-17 | End: 2022-06-08

## 2021-07-21 ASSESSMENT — ROUTINE ASSESSMENT OF PATIENT INDEX DATA (RAPID3)
FATIGUE SCORE: 7
PATIENT GLOBAL ASSESSMENT SCORE: 7
AM STIFFNESS SCORE: 1, YES
PAIN SCORE: 3
TOTAL RAPID3 SCORE: 4.22
PSYCHOLOGICAL DISTRESS SCORE: 5.5
MDHAQ FUNCTION SCORE: 0.8

## 2021-07-23 LAB — ALDOLASE SERPL-CCNC: 5.9 U/L (ref 1.2–7.6)

## 2021-07-30 ENCOUNTER — PATIENT MESSAGE (OUTPATIENT)
Dept: RHEUMATOLOGY | Facility: CLINIC | Age: 51
End: 2021-07-30

## 2021-08-19 ENCOUNTER — PATIENT MESSAGE (OUTPATIENT)
Dept: RHEUMATOLOGY | Facility: CLINIC | Age: 51
End: 2021-08-19

## 2021-09-10 LAB
MISCELLANEOUS TEST NAME: NORMAL
REFERENCE LAB: NORMAL
SPECIMEN TYPE: NORMAL
TEST RESULT: NORMAL

## 2021-09-14 LAB
MISCELLANEOUS TEST NAME: NORMAL
REFERENCE LAB: NORMAL
SPECIMEN TYPE: NORMAL
TEST RESULT: NORMAL

## 2021-10-27 ENCOUNTER — OFFICE VISIT (OUTPATIENT)
Dept: RHEUMATOLOGY | Facility: CLINIC | Age: 51
End: 2021-10-27
Payer: COMMERCIAL

## 2021-10-27 ENCOUNTER — LAB VISIT (OUTPATIENT)
Dept: LAB | Facility: HOSPITAL | Age: 51
End: 2021-10-27
Payer: COMMERCIAL

## 2021-10-27 VITALS
DIASTOLIC BLOOD PRESSURE: 80 MMHG | HEART RATE: 88 BPM | WEIGHT: 257 LBS | BODY MASS INDEX: 40.34 KG/M2 | HEIGHT: 67 IN | SYSTOLIC BLOOD PRESSURE: 118 MMHG

## 2021-10-27 DIAGNOSIS — M13.0 POLYARTHRITIS: ICD-10-CM

## 2021-10-27 DIAGNOSIS — M06.9 RHEUMATOID ARTHRITIS, INVOLVING UNSPECIFIED SITE, UNSPECIFIED WHETHER RHEUMATOID FACTOR PRESENT: ICD-10-CM

## 2021-10-27 DIAGNOSIS — M06.9 RHEUMATOID ARTHRITIS, INVOLVING UNSPECIFIED SITE, UNSPECIFIED WHETHER RHEUMATOID FACTOR PRESENT: Primary | ICD-10-CM

## 2021-10-27 PROCEDURE — 86480 TB TEST CELL IMMUN MEASURE: CPT

## 2021-10-27 PROCEDURE — 99214 PR OFFICE/OUTPT VISIT, EST, LEVL IV, 30-39 MIN: ICD-10-PCS | Mod: S$GLB,,,

## 2021-10-27 PROCEDURE — 99214 OFFICE O/P EST MOD 30 MIN: CPT | Mod: S$GLB,,,

## 2021-10-27 PROCEDURE — 99999 PR PBB SHADOW E&M-EST. PATIENT-LVL III: ICD-10-PCS | Mod: PBBFAC,,,

## 2021-10-27 PROCEDURE — 99999 PR PBB SHADOW E&M-EST. PATIENT-LVL III: CPT | Mod: PBBFAC,,,

## 2021-10-27 PROCEDURE — 36415 COLL VENOUS BLD VENIPUNCTURE: CPT

## 2021-10-27 RX ORDER — PREDNISONE 2.5 MG/1
10 TABLET ORAL DAILY
Qty: 120 TABLET | Refills: 1 | Status: SHIPPED | OUTPATIENT
Start: 2021-10-27 | End: 2021-12-08 | Stop reason: SDUPTHER

## 2021-10-27 RX ORDER — ETANERCEPT 50 MG/ML
50 SOLUTION SUBCUTANEOUS WEEKLY
Qty: 4 ML | Refills: 3 | Status: SHIPPED | OUTPATIENT
Start: 2021-10-27 | End: 2022-03-16

## 2021-10-27 ASSESSMENT — ROUTINE ASSESSMENT OF PATIENT INDEX DATA (RAPID3)
AM STIFFNESS SCORE: 1, YES
PSYCHOLOGICAL DISTRESS SCORE: 5.5
FATIGUE SCORE: 6
PATIENT GLOBAL ASSESSMENT SCORE: 6.5
MDHAQ FUNCTION SCORE: 1.3
WHEN YOU AWAKENED IN THE MORNING OVER THE LAST WEEK, PLEASE INDICATE THE AMOUNT OF TIME IT TAKES UNTIL YOU ARE AS LIMBER AS YOU WILL BE FOR THE DAY: 1
PAIN SCORE: 8
TOTAL RAPID3 SCORE: 6.28

## 2021-10-28 ENCOUNTER — SPECIALTY PHARMACY (OUTPATIENT)
Dept: PHARMACY | Facility: CLINIC | Age: 51
End: 2021-10-28
Payer: COMMERCIAL

## 2021-10-29 LAB
GAMMA INTERFERON BACKGROUND BLD IA-ACNC: 0.05 IU/ML
M TB IFN-G CD4+ BCKGRND COR BLD-ACNC: 0.03 IU/ML
MITOGEN IGNF BCKGRD COR BLD-ACNC: >10 IU/ML
TB GOLD PLUS: NEGATIVE
TB2 - NIL: 0.02 IU/ML

## 2021-11-03 ENCOUNTER — SPECIALTY PHARMACY (OUTPATIENT)
Dept: PHARMACY | Facility: CLINIC | Age: 51
End: 2021-11-03
Payer: COMMERCIAL

## 2021-12-08 ENCOUNTER — LAB VISIT (OUTPATIENT)
Dept: LAB | Facility: HOSPITAL | Age: 51
End: 2021-12-08
Payer: COMMERCIAL

## 2021-12-08 ENCOUNTER — OFFICE VISIT (OUTPATIENT)
Dept: RHEUMATOLOGY | Facility: CLINIC | Age: 51
End: 2021-12-08
Payer: COMMERCIAL

## 2021-12-08 VITALS
BODY MASS INDEX: 41.12 KG/M2 | WEIGHT: 262 LBS | DIASTOLIC BLOOD PRESSURE: 82 MMHG | SYSTOLIC BLOOD PRESSURE: 128 MMHG | HEART RATE: 109 BPM | HEIGHT: 67 IN

## 2021-12-08 DIAGNOSIS — M13.0 POLYARTHRITIS: ICD-10-CM

## 2021-12-08 DIAGNOSIS — M06.9 RHEUMATOID ARTHRITIS, INVOLVING UNSPECIFIED SITE, UNSPECIFIED WHETHER RHEUMATOID FACTOR PRESENT: ICD-10-CM

## 2021-12-08 DIAGNOSIS — S46.211A RUPTURE OF RIGHT BICEPS TENDON, INITIAL ENCOUNTER: Primary | ICD-10-CM

## 2021-12-08 DIAGNOSIS — S46.211A RUPTURE OF RIGHT BICEPS TENDON, INITIAL ENCOUNTER: ICD-10-CM

## 2021-12-08 LAB
ALBUMIN SERPL BCP-MCNC: 4.5 G/DL (ref 3.5–5.2)
ALP SERPL-CCNC: 77 U/L (ref 55–135)
ALT SERPL W/O P-5'-P-CCNC: 63 U/L (ref 10–44)
ANION GAP SERPL CALC-SCNC: 13 MMOL/L (ref 8–16)
AST SERPL-CCNC: 35 U/L (ref 10–40)
BASOPHILS # BLD AUTO: 0.04 K/UL (ref 0–0.2)
BASOPHILS NFR BLD: 0.4 % (ref 0–1.9)
BILIRUB SERPL-MCNC: 1.2 MG/DL (ref 0.1–1)
BUN SERPL-MCNC: 11 MG/DL (ref 6–20)
CALCIUM SERPL-MCNC: 9.9 MG/DL (ref 8.7–10.5)
CHLORIDE SERPL-SCNC: 99 MMOL/L (ref 95–110)
CO2 SERPL-SCNC: 24 MMOL/L (ref 23–29)
CREAT SERPL-MCNC: 0.9 MG/DL (ref 0.5–1.4)
CRP SERPL-MCNC: 2.5 MG/L (ref 0–8.2)
DIFFERENTIAL METHOD: ABNORMAL
EOSINOPHIL # BLD AUTO: 0 K/UL (ref 0–0.5)
EOSINOPHIL NFR BLD: 0 % (ref 0–8)
ERYTHROCYTE [DISTWIDTH] IN BLOOD BY AUTOMATED COUNT: 13.5 % (ref 11.5–14.5)
ERYTHROCYTE [SEDIMENTATION RATE] IN BLOOD BY WESTERGREN METHOD: 15 MM/HR (ref 0–23)
EST. GFR  (AFRICAN AMERICAN): >60 ML/MIN/1.73 M^2
EST. GFR  (NON AFRICAN AMERICAN): >60 ML/MIN/1.73 M^2
GLUCOSE SERPL-MCNC: 105 MG/DL (ref 70–110)
HCT VFR BLD AUTO: 48.4 % (ref 40–54)
HGB BLD-MCNC: 16 G/DL (ref 14–18)
IMM GRANULOCYTES # BLD AUTO: 0.04 K/UL (ref 0–0.04)
IMM GRANULOCYTES NFR BLD AUTO: 0.4 % (ref 0–0.5)
LYMPHOCYTES # BLD AUTO: 1.1 K/UL (ref 1–4.8)
LYMPHOCYTES NFR BLD: 9.8 % (ref 18–48)
MCH RBC QN AUTO: 31.1 PG (ref 27–31)
MCHC RBC AUTO-ENTMCNC: 33.1 G/DL (ref 32–36)
MCV RBC AUTO: 94 FL (ref 82–98)
MONOCYTES # BLD AUTO: 0.5 K/UL (ref 0.3–1)
MONOCYTES NFR BLD: 4.8 % (ref 4–15)
NEUTROPHILS # BLD AUTO: 9.6 K/UL (ref 1.8–7.7)
NEUTROPHILS NFR BLD: 84.6 % (ref 38–73)
NRBC BLD-RTO: 0 /100 WBC
PLATELET # BLD AUTO: 280 K/UL (ref 150–450)
PMV BLD AUTO: 11.3 FL (ref 9.2–12.9)
POTASSIUM SERPL-SCNC: 4.1 MMOL/L (ref 3.5–5.1)
PROT SERPL-MCNC: 7.7 G/DL (ref 6–8.4)
RBC # BLD AUTO: 5.14 M/UL (ref 4.6–6.2)
SODIUM SERPL-SCNC: 136 MMOL/L (ref 136–145)
WBC # BLD AUTO: 11.28 K/UL (ref 3.9–12.7)

## 2021-12-08 PROCEDURE — 36415 COLL VENOUS BLD VENIPUNCTURE: CPT

## 2021-12-08 PROCEDURE — 99214 PR OFFICE/OUTPT VISIT, EST, LEVL IV, 30-39 MIN: ICD-10-PCS | Mod: S$GLB,,,

## 2021-12-08 PROCEDURE — 30000890 MISCELLANEOUS SENDOUT TEST, BLOOD

## 2021-12-08 PROCEDURE — 85652 RBC SED RATE AUTOMATED: CPT

## 2021-12-08 PROCEDURE — 99999 PR PBB SHADOW E&M-EST. PATIENT-LVL III: ICD-10-PCS | Mod: PBBFAC,,,

## 2021-12-08 PROCEDURE — 99999 PR PBB SHADOW E&M-EST. PATIENT-LVL III: CPT | Mod: PBBFAC,,,

## 2021-12-08 PROCEDURE — 99214 OFFICE O/P EST MOD 30 MIN: CPT | Mod: S$GLB,,,

## 2021-12-08 PROCEDURE — 83516 IMMUNOASSAY NONANTIBODY: CPT

## 2021-12-08 PROCEDURE — 85025 COMPLETE CBC W/AUTO DIFF WBC: CPT

## 2021-12-08 PROCEDURE — 86140 C-REACTIVE PROTEIN: CPT

## 2021-12-08 PROCEDURE — 80053 COMPREHEN METABOLIC PANEL: CPT

## 2021-12-08 RX ORDER — METHOTREXATE 2.5 MG/1
TABLET ORAL
Qty: 96 TABLET | Refills: 3 | Status: SHIPPED | OUTPATIENT
Start: 2021-12-08 | End: 2022-03-16 | Stop reason: CLARIF

## 2021-12-08 RX ORDER — PREDNISONE 2.5 MG/1
10 TABLET ORAL DAILY
Qty: 120 TABLET | Refills: 1 | Status: SHIPPED | OUTPATIENT
Start: 2021-12-08 | End: 2022-02-06

## 2021-12-08 ASSESSMENT — ROUTINE ASSESSMENT OF PATIENT INDEX DATA (RAPID3)
TOTAL RAPID3 SCORE: 5.28
MDHAQ FUNCTION SCORE: 1
FATIGUE SCORE: 5.5
WHEN YOU AWAKENED IN THE MORNING OVER THE LAST WEEK, PLEASE INDICATE THE AMOUNT OF TIME IT TAKES UNTIL YOU ARE AS LIMBER AS YOU WILL BE FOR THE DAY: 1
PSYCHOLOGICAL DISTRESS SCORE: 4.4
PAIN SCORE: 7.5
AM STIFFNESS SCORE: 1, YES
PATIENT GLOBAL ASSESSMENT SCORE: 5

## 2021-12-29 ENCOUNTER — PATIENT MESSAGE (OUTPATIENT)
Dept: RHEUMATOLOGY | Facility: CLINIC | Age: 51
End: 2021-12-29
Payer: COMMERCIAL

## 2022-01-12 LAB
MISCELLANEOUS TEST NAME: NORMAL
REFERENCE LAB: NORMAL
SPECIMEN TYPE: NORMAL
TEST RESULT: NORMAL

## 2022-01-22 ENCOUNTER — PATIENT MESSAGE (OUTPATIENT)
Dept: RHEUMATOLOGY | Facility: CLINIC | Age: 52
End: 2022-01-22
Payer: COMMERCIAL

## 2022-01-24 ENCOUNTER — TELEPHONE (OUTPATIENT)
Dept: RHEUMATOLOGY | Facility: CLINIC | Age: 52
End: 2022-01-24
Payer: COMMERCIAL

## 2022-01-24 DIAGNOSIS — D84.9 COVID-19 IN IMMUNOCOMPROMISED PATIENT: Primary | ICD-10-CM

## 2022-01-24 DIAGNOSIS — U07.1 COVID-19 IN IMMUNOCOMPROMISED PATIENT: Primary | ICD-10-CM

## 2022-01-24 NOTE — TELEPHONE ENCOUNTER
Please schedule EUA sotrovimab antibody infusion for tomorrow. Please tell pt to hold methotrexate and Enbrel until symptom free x 10 days. MARKOS

## 2022-01-26 ENCOUNTER — TELEPHONE (OUTPATIENT)
Dept: RHEUMATOLOGY | Facility: CLINIC | Age: 52
End: 2022-01-26
Payer: COMMERCIAL

## 2022-01-26 NOTE — TELEPHONE ENCOUNTER
Keesha Aviles MA Williamson, Michael 1 hour ago (3:57 PM)     CS      Paxlovid drug interaction : hold venlafaxine effexor, atorvastatin, amlodipine.      Needs to remain off enbrel, methotrexate.  He may continue  prednisone, telmisartan- hctz, and sertraline    pharmcy number is [phone]         CRYSTAL Pena Michael 1 hour ago (3:54 PM)     You 1 hour ago (3:41 PM)            Covid risk score: 4 immunocompromised, EH, obesity, male     Paxlovid drug interaction: hold venlafaxine Effexor, atorvastatin, amlodipine     Needs to remain off Enbrel, methotrexate     Continue prednisone, telmisartan-HCTZ, sertraline     Paxlovid Rx sent to  [facility], was available. Wife will drive to  [facility]         Documentation

## 2022-01-26 NOTE — TELEPHONE ENCOUNTER
Covid risk score: 4 immunocompromised, EH, obesity, male     Paxlovid drug interaction: hold venlafaxine Effexor, atorvastatin, amlodipine     Needs to remain off Enbrel, methotrexate     Continue prednisone, telmisartan-HCTZ, sertraline     Paxlovid Rx sent to  [facility], was available. Wife will drive to  [facility]

## 2022-01-26 NOTE — TELEPHONE ENCOUNTER
Spoke with wife,  Dr schmitz said to get it done in [place] or have his dr give him paxlovid.    Dr schmitz put orders in for eua.  We can not schedule. It goes to the infusion  Center , they look at the patient's chart, and they decided who is the most critical.   And they call the patients

## 2022-01-26 NOTE — PROGRESS NOTES
Covid risk score: 4 immunocompromised, EH, obesity, male    Paxlovid drug interaction: hold venlafaxine Effexor, atorvastatin, amlodipine    Needs to remain off Enbrel, methotrexate    Continue prednisone, telmisartan-HCTZ, sertraline

## 2022-03-14 ENCOUNTER — TELEPHONE (OUTPATIENT)
Dept: RHEUMATOLOGY | Facility: CLINIC | Age: 52
End: 2022-03-14

## 2022-03-16 ENCOUNTER — LAB VISIT (OUTPATIENT)
Dept: LAB | Facility: HOSPITAL | Age: 52
End: 2022-03-16

## 2022-03-16 ENCOUNTER — OFFICE VISIT (OUTPATIENT)
Dept: RHEUMATOLOGY | Facility: CLINIC | Age: 52
End: 2022-03-16
Payer: COMMERCIAL

## 2022-03-16 VITALS
DIASTOLIC BLOOD PRESSURE: 87 MMHG | HEIGHT: 67 IN | HEART RATE: 96 BPM | BODY MASS INDEX: 40.34 KG/M2 | WEIGHT: 257 LBS | SYSTOLIC BLOOD PRESSURE: 122 MMHG

## 2022-03-16 DIAGNOSIS — M60.9 MYOSITIS, UNSPECIFIED MYOSITIS TYPE, UNSPECIFIED SITE: ICD-10-CM

## 2022-03-16 DIAGNOSIS — M06.9 RHEUMATOID ARTHRITIS, INVOLVING UNSPECIFIED SITE, UNSPECIFIED WHETHER RHEUMATOID FACTOR PRESENT: ICD-10-CM

## 2022-03-16 DIAGNOSIS — M25.50 ARTHRALGIA, UNSPECIFIED JOINT: ICD-10-CM

## 2022-03-16 DIAGNOSIS — M06.9 RHEUMATOID ARTHRITIS, INVOLVING UNSPECIFIED SITE, UNSPECIFIED WHETHER RHEUMATOID FACTOR PRESENT: Primary | ICD-10-CM

## 2022-03-16 LAB
ALBUMIN SERPL BCP-MCNC: 4.8 G/DL (ref 3.5–5.2)
ALP SERPL-CCNC: 76 U/L (ref 55–135)
ALT SERPL W/O P-5'-P-CCNC: 35 U/L (ref 10–44)
ANION GAP SERPL CALC-SCNC: 14 MMOL/L (ref 8–16)
AST SERPL-CCNC: 24 U/L (ref 10–40)
BASOPHILS # BLD AUTO: 0.06 K/UL (ref 0–0.2)
BASOPHILS NFR BLD: 0.8 % (ref 0–1.9)
BILIRUB SERPL-MCNC: 1.1 MG/DL (ref 0.1–1)
BUN SERPL-MCNC: 11 MG/DL (ref 6–20)
CALCIUM SERPL-MCNC: 11 MG/DL (ref 8.7–10.5)
CHLORIDE SERPL-SCNC: 104 MMOL/L (ref 95–110)
CO2 SERPL-SCNC: 28 MMOL/L (ref 23–29)
CREAT SERPL-MCNC: 1.1 MG/DL (ref 0.5–1.4)
CRP SERPL-MCNC: 2.6 MG/L (ref 0–8.2)
DIFFERENTIAL METHOD: NORMAL
EOSINOPHIL # BLD AUTO: 0 K/UL (ref 0–0.5)
EOSINOPHIL NFR BLD: 0.5 % (ref 0–8)
ERYTHROCYTE [DISTWIDTH] IN BLOOD BY AUTOMATED COUNT: 13.5 % (ref 11.5–14.5)
ERYTHROCYTE [SEDIMENTATION RATE] IN BLOOD BY WESTERGREN METHOD: 23 MM/HR (ref 0–23)
EST. GFR  (AFRICAN AMERICAN): >60 ML/MIN/1.73 M^2
EST. GFR  (NON AFRICAN AMERICAN): >60 ML/MIN/1.73 M^2
GLUCOSE SERPL-MCNC: 114 MG/DL (ref 70–110)
HCT VFR BLD AUTO: 49.8 % (ref 40–54)
HGB BLD-MCNC: 16.3 G/DL (ref 14–18)
IMM GRANULOCYTES # BLD AUTO: 0.01 K/UL (ref 0–0.04)
IMM GRANULOCYTES NFR BLD AUTO: 0.1 % (ref 0–0.5)
LYMPHOCYTES # BLD AUTO: 1.7 K/UL (ref 1–4.8)
LYMPHOCYTES NFR BLD: 22.8 % (ref 18–48)
MCH RBC QN AUTO: 31 PG (ref 27–31)
MCHC RBC AUTO-ENTMCNC: 32.7 G/DL (ref 32–36)
MCV RBC AUTO: 95 FL (ref 82–98)
MONOCYTES # BLD AUTO: 0.6 K/UL (ref 0.3–1)
MONOCYTES NFR BLD: 8.1 % (ref 4–15)
NEUTROPHILS # BLD AUTO: 5 K/UL (ref 1.8–7.7)
NEUTROPHILS NFR BLD: 67.7 % (ref 38–73)
NRBC BLD-RTO: 0 /100 WBC
PLATELET # BLD AUTO: 288 K/UL (ref 150–450)
PMV BLD AUTO: 11.5 FL (ref 9.2–12.9)
POTASSIUM SERPL-SCNC: 5.2 MMOL/L (ref 3.5–5.1)
PROT SERPL-MCNC: 8.4 G/DL (ref 6–8.4)
RBC # BLD AUTO: 5.26 M/UL (ref 4.6–6.2)
SODIUM SERPL-SCNC: 146 MMOL/L (ref 136–145)
WBC # BLD AUTO: 7.38 K/UL (ref 3.9–12.7)

## 2022-03-16 PROCEDURE — 96372 THER/PROPH/DIAG INJ SC/IM: CPT | Mod: S$GLB,,, | Performed by: INTERNAL MEDICINE

## 2022-03-16 PROCEDURE — 99999 PR PBB SHADOW E&M-EST. PATIENT-LVL III: ICD-10-PCS | Mod: PBBFAC,,,

## 2022-03-16 PROCEDURE — 85025 COMPLETE CBC W/AUTO DIFF WBC: CPT

## 2022-03-16 PROCEDURE — 99215 OFFICE O/P EST HI 40 MIN: CPT | Mod: 25,S$GLB,,

## 2022-03-16 PROCEDURE — 99999 PR PBB SHADOW E&M-EST. PATIENT-LVL III: CPT | Mod: PBBFAC,,,

## 2022-03-16 PROCEDURE — 86140 C-REACTIVE PROTEIN: CPT

## 2022-03-16 PROCEDURE — 99215 PR OFFICE/OUTPT VISIT, EST, LEVL V, 40-54 MIN: ICD-10-PCS | Mod: 25,S$GLB,,

## 2022-03-16 PROCEDURE — 96372 PR INJECTION,THERAP/PROPH/DIAG2ST, IM OR SUBCUT: ICD-10-PCS | Mod: S$GLB,,, | Performed by: INTERNAL MEDICINE

## 2022-03-16 PROCEDURE — 80053 COMPREHEN METABOLIC PANEL: CPT

## 2022-03-16 PROCEDURE — 85652 RBC SED RATE AUTOMATED: CPT

## 2022-03-16 PROCEDURE — 36415 COLL VENOUS BLD VENIPUNCTURE: CPT

## 2022-03-16 RX ORDER — TRIAMCINOLONE ACETONIDE 40 MG/ML
40 INJECTION, SUSPENSION INTRA-ARTICULAR; INTRAMUSCULAR
Status: COMPLETED | OUTPATIENT
Start: 2022-03-16 | End: 2022-03-16

## 2022-03-16 RX ORDER — ABATACEPT 125 MG/ML
125 INJECTION, SOLUTION SUBCUTANEOUS
Qty: 4 EACH | Refills: 2 | OUTPATIENT
Start: 2022-03-16 | End: 2022-03-28 | Stop reason: SDUPTHER

## 2022-03-16 RX ORDER — METHOTREXATE 25 MG/.5ML
25 INJECTION, SOLUTION SUBCUTANEOUS
Qty: 4 EACH | Refills: 3 | OUTPATIENT
Start: 2022-03-16 | End: 2022-03-28 | Stop reason: SDUPTHER

## 2022-03-16 RX ORDER — PREDNISONE 5 MG/1
TABLET ORAL
Qty: 90 TABLET | Refills: 0 | Status: SHIPPED | OUTPATIENT
Start: 2022-03-16 | End: 2022-05-10 | Stop reason: SDUPTHER

## 2022-03-16 RX ADMIN — TRIAMCINOLONE ACETONIDE 40 MG: 40 INJECTION, SUSPENSION INTRA-ARTICULAR; INTRAMUSCULAR at 12:03

## 2022-03-16 NOTE — PROGRESS NOTES
Folic acid sent to pharmacy. Synthroid was sent 12/23 for 90 days with 3 refills. LM notifying patient of this.   Subjective:       Patient ID: Joaquín Rod is a 51 y.o. male.    Chief Complaint: No chief complaint on file.    HPI   50 year old CM  He has been told he has rheumatoid, SLE, sjogrens, and treated for lyme in [place] last summer  Right shoulder, left knee, left elbow  Pain started 1 year ago, they get warm and swollen      Piano key sign  Medial epicondylitis      PMH: HTN, HLD, Anxiety  PSH: Inguinal hernia 12 years ago, C-scope 2020  FH: Sister with DM, Mother and father with DM, Mother's side grandfather had RA along with grandfather's brother with RA  SH: does chewing tobacco, never a smoker, denies alcohol, no drugs  Allergies: none     Interval History:  3/12/21:  Patient has been on Prednisone 10mg with some improvement of his joint pain. His left elbow still bothers him.   Never been treated for positive strongy ab, will treat        6/11/21:  Inflammatory markers previously were negative 3 months ago, myomarker showed weakly positive PL-12.   Two days ago had swelling in left hand, it has improved today.   Had Chest CT negative for ILD  MRI thighs negative for myositis  Taking MTX 2 tabs am and 2 tabs pm on every Saturday with daily folic acid     7/21/21:  Takes MTX on sat(5 tabs am and 5 tabs in pm) along daily folic acid  Taking Prednisone 10mg daily  He has been feeling really well and noticed a night and day improvement since being on the 10 tabs dose about 2 weeks ago  He is tolerating the medicine well.    10/27/21:  Anti-CarP positive at 22 (normal <20)  Stopped Prednisone Mid August 2021  Had return of joint pains and aches since stopping, he does feel the MTX helps, which he takes on every Saturday with daily folic acid    12/8/21:  Patient had a right biceps tendon rupture 1 month ago; his joint pains have improved with the enbrel but not quite 100%; he is still taking the Prednisone 10mg;  Its been 4 weeks on Enbrel    3/16/22:  Prednisone 10mg daily, Enbrel once weekly, and MTX 10  "tabs with total dose of 25mg as split dosing on every Saturday along with daily folic acid  Had gotten COVID January, held meds, restarted Mid Feb;   He states that he feels he has gotten worse, he has trouble driving due to his stiffness, he can no longer work due to his joint pains and swelling; He is unable to even play the piano at [employer] due to swelling his in hands after playing just a few hymns. It has severely impacted his daily living and how he describes it, everything that "made me me" he is unable to do/be. His daughters wedding is coming up this sat and he is worried about not being able to help out.       Review of Systems   Constitutional: Negative for chills, fever and unexpected weight change.   HENT: Negative for mouth sores and trouble swallowing.    Eyes: Negative for redness and visual disturbance.   Respiratory: Positive for shortness of breath. Negative for cough and chest tightness.    Cardiovascular: Negative for chest pain.   Gastrointestinal: Negative for constipation and diarrhea.   Genitourinary: Negative for genital sores.   Musculoskeletal: Positive for arthralgias and joint swelling.   Skin: Negative for rash.   Neurological: Negative for weakness and headaches.   Hematological: Does not bruise/bleed easily.   Psychiatric/Behavioral: Negative for confusion.         Objective:   /87   Pulse 96   Ht 5' 7.2" (1.707 m)   Wt 116.6 kg (257 lb)   BMI 40.01 kg/m²      Physical Exam   Constitutional: He is oriented to person, place, and time. No distress.   HENT:   Head: Normocephalic and atraumatic.   Mouth/Throat: Oropharynx is clear and moist.   Eyes: Pupils are equal, round, and reactive to light. Conjunctivae are normal.   Cardiovascular: Normal rate, regular rhythm and normal heart sounds.   Pulmonary/Chest: Effort normal and breath sounds normal. No respiratory distress. He has no wheezes.   Abdominal: There is no abdominal tenderness.   Musculoskeletal:         General: " Tenderness present. No deformity.      Comments: Refer to joint exam   Neurological: He is alert and oriented to person, place, and time. Gait normal.   Skin: Skin is warm and dry. No rash noted. He is not diaphoretic.   Psychiatric: Mood, memory, affect and judgment normal.   Vitals reviewed.          7/21/2021 10/27/2021 12/8/2021 3/16/2022   Tender (JOSEPH-28) 2 / 28 23 / 28 21 / 28 21 / 28    Swollen (JOSEPH-28) 3 / 28  23 / 28  14 / 28  10 / 28    Provider Global -- -- -- 40 mm   Patient Global -- -- -- 50 mm   ESR -- -- -- 23 mm/hr   CRP -- -- -- 2.6 mg/L   JOSEPH-28 (ESR) -- -- -- 6.35 (High disease activity)   JOSEPH-28 (CRP) -- -- -- 5.57 (High disease activity)   CDAI Score -- -- -- 40         Assessment:       No diagnosis found.        Plan:       Problem List Items Addressed This Visit    None           50 year old CM with a PMH of HTN, HLD, and Anxiety presents to clinic for 2nd opinion. Patient has joint pain that started 1 year ago and has gotten worse over time with swelling and pain. His main joints effected are hands, left elbow, left knee, and right shoulder.     Discrepancy between care-everywhere RF and what is mentioned in outside notes  Patient does have synovitis on exam  Pre-DMARDs negative except for Strongy(treated 3/2021)  xrays hands, knees, elbow L, and shoulders without erosions   CT chest without ILD  MRI femurs negative for myositis  PFT with mild restriction  Anti-CarP positive    Med History:  MTX tablets - not effective  Enbrel - not effective, tried for few months      Assessment:  Seronegative RA  Right Biceps tendon rupture - stable    Rheumatoid Arthritis Treatment Goals:  -Disease Activity Measure: 6.3 high  -Target: Low or Remission  -Patient Goal: Would like to function as he once did, play the piano at [employer]  -Therapy/Change: changing enbrel to orencia and injectable MTX  -Shared Decision Making: Discussed goals of care and therapy plan together with patient as outlined above.        Plan:  -Kenalog 40mg shot today  -Increase Prednisone to 20mg for 1 week, then 15mg for 1 week, then 10mg until seen in clinic  -Discontinue Enbrel  -Will change to Orencia 125mg q weekly  -Will try Injectible MTX total dose of 25mg on every Saturday along with daily folic acid      RTC 6-8 weeks

## 2022-03-17 NOTE — PROGRESS NOTES
I have personally reviewed the history, confirmed exam findings, and discussed assessment and plan with fellow. Highly active RA despite maximal oral methotrexate 25mg once a week, Enbrel weekly and prednisone 10mg daily. Will give Kenalog 40mg IM for flare, increase prednisone to 20mg daily for one week, then 15mg daily for one week, then 10mg daily and continue. Stop Enbrel and proceed to Orencia after repeat pre-DMARD labs. MARKOS

## 2022-03-23 ENCOUNTER — PATIENT MESSAGE (OUTPATIENT)
Dept: RHEUMATOLOGY | Facility: CLINIC | Age: 52
End: 2022-03-23

## 2022-03-24 DIAGNOSIS — E87.5 HYPERKALEMIA: ICD-10-CM

## 2022-03-24 DIAGNOSIS — M06.9 RHEUMATOID ARTHRITIS, INVOLVING UNSPECIFIED SITE, UNSPECIFIED WHETHER RHEUMATOID FACTOR PRESENT: ICD-10-CM

## 2022-03-24 DIAGNOSIS — M06.9 RHEUMATOID ARTHRITIS, INVOLVING UNSPECIFIED SITE, UNSPECIFIED WHETHER RHEUMATOID FACTOR PRESENT: Primary | ICD-10-CM

## 2022-03-24 RX ORDER — FOLIC ACID 1 MG/1
1 TABLET ORAL DAILY
Qty: 90 TABLET | Refills: 3 | Status: SHIPPED | OUTPATIENT
Start: 2022-03-24 | End: 2023-05-03

## 2022-03-28 ENCOUNTER — SPECIALTY PHARMACY (OUTPATIENT)
Dept: PHARMACY | Facility: CLINIC | Age: 52
End: 2022-03-28

## 2022-03-28 RX ORDER — ABATACEPT 125 MG/ML
125 INJECTION, SOLUTION SUBCUTANEOUS
Qty: 4 EACH | Refills: 2 | Status: SHIPPED | OUTPATIENT
Start: 2022-03-28 | End: 2022-03-30 | Stop reason: SDUPTHER

## 2022-03-28 RX ORDER — METHOTREXATE 25 MG/.5ML
25 INJECTION, SOLUTION SUBCUTANEOUS
Qty: 4 EACH | Refills: 3 | Status: SHIPPED | OUTPATIENT
Start: 2022-03-28 | End: 2022-03-30 | Stop reason: SDUPTHER

## 2022-03-28 NOTE — TELEPHONE ENCOUNTER
Incoming call from the provider's office to check status on Orencia and Rasuvo. Test claim indicated that coverage is . Eligibility check did not provide any results. Contact the patient, and obtained updated insurance information. PA required. Routing the Rheumatology team for pharmacist assignment.

## 2022-03-28 NOTE — TELEPHONE ENCOUNTER
----- Message from Freda Meng sent at 3/28/2022  8:36 AM CDT -----  Regarding: Prescription Refill  methotrexate, PF, (RASUVO, PF,) 25 mg/0.5 mL AtIn      abatacept (ORENCIA CLICKJECT) 125 mg/mL AtIn        [facility] - [place], [place] - [address]   Phone: [phone]  Fax: [phone]      Pharmacy called to refill the above medications.

## 2022-03-28 NOTE — TELEPHONE ENCOUNTER
----- Message from Freda Meng sent at 3/28/2022  8:36 AM CDT -----  Regarding: Prescription Refill  methotrexate, PF, (RASUVO, PF,) 25 mg/0.5 mL AtIn      abatacept (ORENCIA CLICKJECT) 125 mg/mL AtIn        [facility] - [place], [place] [address]   Phone: [phone]  Fax: [phone]      Pharmacy called to refill the above medications.

## 2022-03-30 RX ORDER — METHOTREXATE 25 MG/.5ML
25 INJECTION, SOLUTION SUBCUTANEOUS
Qty: 4 EACH | Refills: 3 | Status: SHIPPED | OUTPATIENT
Start: 2022-03-30 | End: 2022-06-08 | Stop reason: SDUPTHER

## 2022-03-30 RX ORDER — ABATACEPT 125 MG/ML
125 INJECTION, SOLUTION SUBCUTANEOUS
Qty: 4 ML | Refills: 2 | Status: SHIPPED | OUTPATIENT
Start: 2022-03-30 | End: 2022-06-08 | Stop reason: SDUPTHER

## 2022-03-30 NOTE — TELEPHONE ENCOUNTER
Orencia and Rasuvo Rxs on file at [facility] have been discontinued.  Messaged provider for new orders if he would like [facility] to work on insurance authorization.

## 2022-03-30 NOTE — TELEPHONE ENCOUNTER
Orencia PA- submitted demographic information on CMM- Waiting on clinical questions-  Key: [id number]    Gina RICHARDS- submitted demographic information on CMM- Waiting on clinical questions-  Key: [id number]

## 2022-03-31 NOTE — TELEPHONE ENCOUNTER
"CM states "Case cannot be processed electronically. Your request has been received and will be reviewed manually. You will receive a decision via fax within 72 hours."      Called [facility] to submit PA.  Rep stated must be done through [employer] [phone].  Called [employer] and gave demographic info for Gina PA.  Rep was unable to provide clinical questions.  Simultaneously received  Faxed Orencia PA denial.  However, we never submitted clinical information for either PA.  Rep explained that plan only reaches out for clinical information, if needed.      Will proceed with Orencia re-consideration and await Rasuvo determination, and do re-consideration (if needed).   "

## 2022-04-01 NOTE — TELEPHONE ENCOUNTER
Kelton RICHARDS reconsideration faxed to [facility] (Fax: [phone])    Awaiting Rasuvo determination.

## 2022-04-05 NOTE — TELEPHONE ENCOUNTER
BI Complete [employer] [employer]    Per [facility] caremark       ;  Deductible 400   ;  [id number] - Patient has copay card  [facility] in network  PA - Orencia approved   --> Rx forwarded to benefit investigation.    Ref # [id number]  4/4/22-10/4/22    No need for FA

## 2022-04-05 NOTE — TELEPHONE ENCOUNTER
"Outgoing call to [employer] to check status of Orencia/Rasuvo PA re-considerations-     Orencia approved   --> Rx forwarded to benefit investigation.    Ref # [id number]  4/4/22-10/4/22    Rasuvo reconsideration was received but kept denied due to medical necessity not met  "Dose cannot exceed 20 mg/week."  Fax will be sent with denial details.       Updated pt's wife- she expressed understanding and said that Pt already has copay card for both medications.   "

## 2022-04-05 NOTE — TELEPHONE ENCOUNTER
Incoming call from the patient's wife regarding Orencia + Rasuvo. She states she received denial letters in the mail and wondering next steps. Advised [facility] has not received denials letters but assigned pharmD will call insurance to get the denial letters. [facility] will handle appeal on behalf of the provider. Informed [facility] will call with updates

## 2022-04-06 ENCOUNTER — SPECIALTY PHARMACY (OUTPATIENT)
Dept: PHARMACY | Facility: CLINIC | Age: 52
End: 2022-04-06

## 2022-04-06 ENCOUNTER — PATIENT MESSAGE (OUTPATIENT)
Dept: PHARMACY | Facility: CLINIC | Age: 52
End: 2022-04-06

## 2022-04-06 DIAGNOSIS — M06.9 RHEUMATOID ARTHRITIS, INVOLVING UNSPECIFIED SITE, UNSPECIFIED WHETHER RHEUMATOID FACTOR PRESENT: ICD-10-CM

## 2022-04-06 NOTE — TELEPHONE ENCOUNTER
Specialty Pharmacy - Initial Clinical Assessment    Specialty Medication Orders Linked to Encounter    Flowsheet Row Most Recent Value   Medication #1 abatacept (ORENCIA CLICKJECT) 125 mg/mL AtIn (Order#[id number], Rx#[id number])        Patient Diagnosis   M06.9 - Rheumatoid disease    Subjective    Joaquín Rod is a 51 y.o. male, who is followed by the specialty pharmacy service for management and education.    Recent Encounters     Date Type Provider Description    04/06/2022 Specialty Pharmacy Ramya Waters PharmD Initial Clinical Assessment    03/28/2022 Specialty Pharmacy Sekou Hayden Referral Authorization    11/03/2021 Specialty Pharmacy Catracho [facility]     10/28/2021 Specialty Pharmacy Lara Martin, [facility] Referral Authorization        Clinical call attempts since last clinical assessment   No call attempts found.     Current Outpatient Medications   Medication Sig    abatacept (ORENCIA CLICKJECT) 125 mg/mL AtIn Inject 125 mg (1 pen) into the skin every 7 days.    amLODIPine (NORVASC) 5 MG tablet amlodipine 5 mg tablet   TAKE 1 TABLET BY MOUTH EVERY DAY    atorvastatin (LIPITOR) 20 MG tablet atorvastatin 20 mg tablet   TAKE 1 TABLET BY MOUTH EVERY DAY    fluticasone propionate (FLONASE) 50 mcg/actuation nasal spray fluticasone propionate 50 mcg/actuation nasal spray,suspension   SPRAY 2 SPRAYS INTO EACH NOSTRIL EVERY DAY    folic acid (FOLVITE) 1 MG tablet Take 1 tablet (1 mg total) by mouth once daily.    methotrexate, PF, (RASUVO, PF,) 25 mg/0.5 mL AtIn Inject 0.5 mLs (25 mg total) into the skin every 7 days.    predniSONE (DELTASONE) 5 MG tablet Take 20mg for 1 week, then 15mg for 1 week, then 10mg until seen in clinic    sertraline (ZOLOFT) 50 MG tablet Take 50 mg by mouth once daily.    telmisartan-hydrochlorothiazide (MICARDIS HCT) 80-25 mg per tablet telmisartan 80 mg-hydrochlorothiazide 25 mg tablet   TAKE 1 TABLET BY MOUTH EVERY DAY    venlafaxine (EFFEXOR) 37.5 MG Tab Take  37.5 mg by mouth.   Last reviewed on 3/16/2022 11:00 AM by Keesha Aviles MA    Review of patient's allergies indicates:  No Known AllergiesLast reviewed on  3/30/2022 1:27 PM by Itz Sanchez          Assessment Questions - Documented Responses    Flowsheet Row Most Recent Value   Assessment    Medication Reconciliation completed for patient Yes   During the past 4 weeks, has patient missed any activities due to condition or medication? Missed Work   Number of Days missed 28  [stopped working 3/25 due to arthritis pain]   During the past 4 weeks, did patient have any of the following urgent care visits? None   Goals of Therapy Status Discussed (new start)   Status of the patients ability to self-administer: Is Able   All education points have been covered with patient? Yes, supplemental printed education provided   Welcome packet contents reviewed and discussed with patient? Yes   Assesment completed? Yes   Plan Therapy being initiated   Do you need to open a clinical intervention (i-vent)? No   Do you want to schedule first shipment? Yes   Medication #1 Assessment Info    Patient status New medication, New to [facility]   Is this medication appropriate for the patient? Yes   Is this medication effective? Not yet started        Refill Questions - Documented Responses    Flowsheet Row Most Recent Value   Refill Screening Questions    When does the patient need to receive the medication? 04/08/22   Refill Delivery Questions    How will the patient receive the medication? Mail   When does the patient need to receive the medication? 04/08/22   Shipping Address Home   Address in [facility] confirmed and updated if neccessary? Yes   Expected Copay ($) 5   Is the patient able to afford the medication copay? Yes   Payment Method CC on file   Days supply of Refill 28   Supplies needed? No supplies needed   Refill activity completed? Yes   Refill activity plan Refill scheduled   Shipment/Pickup Date: 04/07/22     "      Objective    He has no past medical history on file.    Tried/failed medications: Enbrel    BP Readings from Last 4 Encounters:   03/16/22 122/87   12/08/21 128/82   10/27/21 118/80   07/21/21 115/76     Ht Readings from Last 4 Encounters:   03/16/22 5' 7.2" (1.707 m)   12/08/21 5' 7.2" (1.707 m)   10/27/21 5' 7.2" (1.707 m)   06/11/21 5' 7.2" (1.707 m)     Wt Readings from Last 4 Encounters:   03/16/22 116.6 kg (257 lb)   12/08/21 118.8 kg (262 lb)   10/27/21 116.6 kg (257 lb)   07/21/21 118 kg (260 lb 2.3 oz)     Recent Labs   Lab Result Units 03/16/22  1237   Creatinine mg/dL 1.1   ALT U/L 35   AST U/L 24     The goals of rheumatoid arthritis treatment include:  · Relieving pain and suppressing inflammation  · Achieving remission and preventing joint and organ damage  · Increasing joint mobility and strength  · Preventing infection and other complications of treatment  · Reducing long term complications of rheumatoid arthritis  · Improving or maintaining physical function and optimal well-being  · Improving or maintaining quality of life  · Maintaining optimal therapy adherence  · Minimizing and managing side effects    Goals of Therapy Status: Discussed (new start)    Assessment/Plan  Patient plans to start therapy on 04/08/22      Indication, dosage, appropriateness, effectiveness, safety and convenience of his specialty medication(s) were reviewed today.     Patient Education   Patient received education on the following:    Expectations and possible outcomes of therapy   Proper use, timely administration, and missed dose management   Duration of therapy   Side effects, including prevention, minimization, and management   Contraindications and safety precautions   New or changed medications, including prescribe and over the counter medications and supplements   Reviews recommended vaccinations, as appropriate   Storage, safe handling, and disposal      Tasks added this encounter   4/29/2022 - " Refill Call (Auto Added)  1/6/2023 - Clinical - Follow Up Assesement (Annual)   Tasks due within next 3 months   4/6/2022 - Referral Authorization     Ramya Waters, PharmD  [facility]  [address]  [facility] [address]  Phone: [phone]  Fax: [phone]

## 2022-04-07 NOTE — TELEPHONE ENCOUNTER
URGENT Rasuvo appeal faxed to Haley from [facility]-      Fax: [phone]                                          Provider updated via staff message.

## 2022-04-08 NOTE — TELEPHONE ENCOUNTER
Received notice that plan denied request for expedited appeal.  Appeal will be reviewed in standard time frame.  Pt's wife updated.

## 2022-04-13 NOTE — TELEPHONE ENCOUNTER
[employer]ana [employer] called to notify [facility] Gina is approved from 4/13/22 - 10/13/23. Copay $0 at 004. Forwarding to BI queue.

## 2022-04-14 NOTE — TELEPHONE ENCOUNTER
Benefits investigation  Spoke with Greta GREENBERGat [employer].     Payor: [employer]  Annual Deductible Amount: 500;  met  Annual Out of Packet (OOP) Maximum: 880 ; met  Estimated Copay: 0  Network Status: In Network  PA approved until 10/13/2023

## 2022-04-18 ENCOUNTER — SPECIALTY PHARMACY (OUTPATIENT)
Dept: PHARMACY | Facility: CLINIC | Age: 52
End: 2022-04-18

## 2022-04-18 ENCOUNTER — PATIENT MESSAGE (OUTPATIENT)
Dept: ADMINISTRATIVE | Facility: OTHER | Age: 52
End: 2022-04-18

## 2022-04-18 NOTE — TELEPHONE ENCOUNTER
Specialty Pharmacy - Initial Clinical Assessment    Specialty Medication Orders Linked to Encounter    Flowsheet Row Most Recent Value   Medication #1 methotrexate, PF, (RASUVO, PF,) 25 mg/0.5 mL AtIn (Order#[id number], Rx#[id number])        Patient Diagnosis   M06.9 - Rheumatoid disease    Subjective    Joaquín Rod is a 51 y.o. male, who is followed by the specialty pharmacy service for management and education.    Recent Encounters     Date Type Provider Description    04/18/2022 Specialty Pharmacy Ramya Waters, [place] Initial Clinical Assessment    04/06/2022 Specialty Pharmacy Ramya Waters, [place] Initial Clinical Assessment    03/28/2022 Specialty Pharmacy [facility], [place] Referral Authorization    11/03/2021 Specialty Pharmacy [facility] [place]     10/28/2021 Specialty Pharmacy Lara Martin, [place] Referral Authorization        Clinical call attempts since last clinical assessment   No call attempts found.     Current Outpatient Medications   Medication Sig    abatacept (ORENCIA CLICKJECT) 125 mg/mL AtIn Inject 125 mg (1 pen) into the skin every 7 days.    amLODIPine (NORVASC) 5 MG tablet amlodipine 5 mg tablet   TAKE 1 TABLET BY MOUTH EVERY DAY    atorvastatin (LIPITOR) 20 MG tablet atorvastatin 20 mg tablet   TAKE 1 TABLET BY MOUTH EVERY DAY    fluticasone propionate (FLONASE) 50 mcg/actuation nasal spray fluticasone propionate 50 mcg/actuation nasal spray,suspension   SPRAY 2 SPRAYS INTO EACH NOSTRIL EVERY DAY    folic acid (FOLVITE) 1 MG tablet Take 1 tablet (1 mg total) by mouth once daily.    methotrexate, PF, (RASUVO, PF,) 25 mg/0.5 mL AtIn Inject 0.5 mLs (25 mg total) into the skin every 7 days.    predniSONE (DELTASONE) 5 MG tablet Take 20mg for 1 week, then 15mg for 1 week, then 10mg until seen in clinic    sertraline (ZOLOFT) 50 MG tablet Take 50 mg by mouth once daily.    telmisartan-hydrochlorothiazide (MICARDIS HCT) 80-25 mg per tablet telmisartan 80 mg-hydrochlorothiazide 25 mg  tablet   TAKE 1 TABLET BY MOUTH EVERY DAY    venlafaxine (EFFEXOR) 37.5 MG Tab Take 37.5 mg by mouth.   Last reviewed on 4/18/2022 10:21 AM by Ramya Waters PharmD    Review of patient's allergies indicates:  No Known AllergiesLast reviewed on  4/18/2022 10:21 AM by Ramya Waters    Drug Interactions    Drug interactions evaluated: yes  Clinically relevant drug interactions identified: no  Provided the patient with educational material regarding drug interactions: not applicable         Adverse Effects    Arthralgias: Pos       Assessment Questions - Documented Responses    Flowsheet Row Most Recent Value   Assessment    Medication Reconciliation completed for patient Yes   During the past 4 weeks, has patient missed any activities due to condition or medication? Missed Work   Number of Days missed 28  [Patient stopped working 3/25 due to arthritis pain]   During the past 4 weeks, did patient have any of the following urgent care visits? None   Goals of Therapy Status Discussed (new start)   Status of the patients ability to self-administer: Is Able   All education points have been covered with patient? Yes, supplemental printed education provided   Welcome packet contents reviewed and discussed with patient? Yes   Assesment completed? Yes   Plan Therapy being initiated   Do you need to open a clinical intervention (i-vent)? No   Do you want to schedule first shipment? Yes        Refill Questions - Documented Responses    Flowsheet Row Most Recent Value   Patient Availability and HIPAA Verification    Does patient want to proceed with activity? Yes   HIPAA/medical authority confirmed? Yes   Relationship to patient of person spoken to? Self   Refill Screening Questions    When does the patient need to receive the medication? 04/23/22   Refill Delivery Questions    How will the patient receive the medication? Mail   When does the patient need to receive the medication? 04/23/22   Shipping Address Home   Address  "in [facility] confirmed and updated if neccessary? Yes   Expected Copay ($) 0   Is the patient able to afford the medication copay? Yes   Payment Method zero copay   Days supply of Refill 28   Supplies needed? No supplies needed   Refill activity completed? Yes   Refill activity plan Refill scheduled   Shipment/Pickup Date: 04/21/22          Objective    He has no past medical history on file.    Tried/failed medications: Enbrel    BP Readings from Last 4 Encounters:   03/16/22 122/87   12/08/21 128/82   10/27/21 118/80   07/21/21 115/76     Ht Readings from Last 4 Encounters:   03/16/22 5' 7.2" (1.707 m)   12/08/21 5' 7.2" (1.707 m)   10/27/21 5' 7.2" (1.707 m)   06/11/21 5' 7.2" (1.707 m)     Wt Readings from Last 4 Encounters:   03/16/22 116.6 kg (257 lb)   12/08/21 118.8 kg (262 lb)   10/27/21 116.6 kg (257 lb)   07/21/21 118 kg (260 lb 2.3 oz)     Recent Labs   Lab Result Units 03/16/22  1237   Creatinine mg/dL 1.1   ALT U/L 35   AST U/L 24     The goals of rheumatoid arthritis treatment include:  · Relieving pain and suppressing inflammation  · Achieving remission and preventing joint and organ damage  · Increasing joint mobility and strength  · Preventing infection and other complications of treatment  · Reducing long term complications of rheumatoid arthritis  · Improving or maintaining physical function and optimal well-being  · Improving or maintaining quality of life  · Maintaining optimal therapy adherence  · Minimizing and managing side effects    Goals of Therapy Status: Discussed (new start)    Assessment/Plan  Patient plans to start therapy on 04/23/22.  PT changing from PO to SQ MTX.  Takes PO MTX on Saturday- last dose 4/16/22.  Will begin SQ MTX 4/23/22.      Indication, dosage, appropriateness, effectiveness, safety and convenience of his specialty medication(s) were reviewed today.     Patient Education   Patient received education on the following:    Expectations and possible outcomes " of therapy   Proper use, timely administration, and missed dose management   Duration of therapy   Side effects, including prevention, minimization, and management   Contraindications and safety precautions   New or changed medications, including prescribe and over the counter medications and supplements   Reviews recommended vaccinations, as appropriate   Storage, safe handling, and disposal      Tasks added this encounter   No tasks added.   Tasks due within next 3 months   No tasks due.     Ramya Waters, PharmD  [facility]  [address] [facility] [address]  Phone: [phone]  Fax: [phone]

## 2022-04-20 ENCOUNTER — PATIENT MESSAGE (OUTPATIENT)
Dept: RHEUMATOLOGY | Facility: CLINIC | Age: 52
End: 2022-04-20

## 2022-04-22 DIAGNOSIS — R21 RASH: Primary | ICD-10-CM

## 2022-04-22 NOTE — PROGRESS NOTES
Called and spoke to patients wife about rash;    They are not sure where it came from, it happened before he started the Orencia; It seems to be improving but I told her lets get the dermatologist to see him; will send referral to them so they can see someone close to them;    She thanked me for the call    RVP

## 2022-04-29 ENCOUNTER — SPECIALTY PHARMACY (OUTPATIENT)
Dept: PHARMACY | Facility: CLINIC | Age: 52
End: 2022-04-29

## 2022-04-29 DIAGNOSIS — M06.9 RHEUMATOID ARTHRITIS, INVOLVING UNSPECIFIED SITE, UNSPECIFIED WHETHER RHEUMATOID FACTOR PRESENT: Primary | ICD-10-CM

## 2022-04-29 NOTE — TELEPHONE ENCOUNTER
Specialty Pharmacy - Refill Coordination    Specialty Medication Orders Linked to Encounter    Flowsheet Row Most Recent Value   Medication #1 abatacept (ORENCIA CLICKJECT) 125 mg/mL AtIn (Order#[id number], Rx#[id number])          Refill Questions - Documented Responses    Flowsheet Row Most Recent Value   Patient Availability and HIPAA Verification    Does patient want to proceed with activity? Yes   HIPAA/medical authority confirmed? Yes   Relationship to patient of person spoken to? Spouse/Significant Other  [Bree, wife]   Refill Screening Questions    Changes to allergies? No   Changes to medications? No   New conditions since last clinic visit? No   Unplanned office visit, urgent care, ED, or hospital admission in the last 4 weeks? No   How does patient/caregiver feel medication is working? Good   Financial problems or insurance changes? No   How many doses of your specialty medications were missed in the last 4 weeks? 0   Would patient like to speak to a pharmacist? No   When does the patient need to receive the medication? 05/03/22   Refill Delivery Questions    How will the patient receive the medication? Mail   When does the patient need to receive the medication? 05/03/22   Shipping Address Home   Address in [facility] confirmed and updated if neccessary? Yes   Expected Copay ($) 0   Is the patient able to afford the medication copay? Yes   Payment Method zero copay   Days supply of Refill 28   Supplies needed? No supplies needed   Refill activity completed? Yes   Refill activity plan Refill scheduled   Shipment/Pickup Date: 05/02/22          Current Outpatient Medications   Medication Sig    abatacept (ORENCIA CLICKJECT) 125 mg/mL AtIn Inject 125 mg (1 pen) into the skin every 7 days.    amLODIPine (NORVASC) 5 MG tablet amlodipine 5 mg tablet   TAKE 1 TABLET BY MOUTH EVERY DAY    atorvastatin (LIPITOR) 20 MG tablet atorvastatin 20 mg tablet   TAKE 1 TABLET BY MOUTH EVERY DAY    fluticasone  propionate (FLONASE) 50 mcg/actuation nasal spray fluticasone propionate 50 mcg/actuation nasal spray,suspension   SPRAY 2 SPRAYS INTO EACH NOSTRIL EVERY DAY    folic acid (FOLVITE) 1 MG tablet Take 1 tablet (1 mg total) by mouth once daily.    methotrexate, PF, (RASUVO, PF,) 25 mg/0.5 mL AtIn Inject 0.5 mLs (25 mg total) into the skin every 7 days.    predniSONE (DELTASONE) 5 MG tablet Take 20mg for 1 week, then 15mg for 1 week, then 10mg until seen in clinic    sertraline (ZOLOFT) 50 MG tablet Take 50 mg by mouth once daily.    telmisartan-hydrochlorothiazide (MICARDIS HCT) 80-25 mg per tablet telmisartan 80 mg-hydrochlorothiazide 25 mg tablet   TAKE 1 TABLET BY MOUTH EVERY DAY    venlafaxine (EFFEXOR) 37.5 MG Tab Take 37.5 mg by mouth.   Last reviewed on 4/18/2022 10:21 AM by Ramya Waters, PharmD    Review of patient's allergies indicates:  No Known Allergies Last reviewed on  4/18/2022 10:21 AM by Ramya Waters      Tasks added this encounter   No tasks added.   Tasks due within next 3 months   4/29/2022 - Refill Call (Auto Added)  5/14/2022 - Refill Call (Auto Added)     [facility]  [address] [place] [id number]  Phone: [phone]  Fax: [phone]

## 2022-05-10 ENCOUNTER — SPECIALTY PHARMACY (OUTPATIENT)
Dept: PHARMACY | Facility: CLINIC | Age: 52
End: 2022-05-10

## 2022-05-10 ENCOUNTER — PATIENT MESSAGE (OUTPATIENT)
Dept: RHEUMATOLOGY | Facility: CLINIC | Age: 52
End: 2022-05-10

## 2022-05-10 DIAGNOSIS — M06.9 RHEUMATOID ARTHRITIS, INVOLVING UNSPECIFIED SITE, UNSPECIFIED WHETHER RHEUMATOID FACTOR PRESENT: Primary | ICD-10-CM

## 2022-05-10 RX ORDER — PREDNISONE 5 MG/1
TABLET ORAL
Qty: 90 TABLET | Refills: 0 | Status: SHIPPED | OUTPATIENT
Start: 2022-05-10 | End: 2022-08-01 | Stop reason: SDUPTHER

## 2022-05-10 RX ORDER — PREDNISONE 5 MG/1
TABLET ORAL
Qty: 90 TABLET | Refills: 0 | Status: CANCELLED | OUTPATIENT
Start: 2022-05-10

## 2022-05-10 NOTE — TELEPHONE ENCOUNTER
Specialty Pharmacy - Refill Coordination    Specialty Medication Orders Linked to Encounter    Flowsheet Row Most Recent Value   Medication #1 methotrexate, PF, (RASUVO, PF,) 25 mg/0.5 mL AtIn (Order#[id number], Rx#[id number])          Refill Questions - Documented Responses    Flowsheet Row Most Recent Value   Patient Availability and HIPAA Verification    Does patient want to proceed with activity? Yes   HIPAA/medical authority confirmed? Yes   Relationship to patient of person spoken to? Spouse/Significant Other   Refill Screening Questions    Changes to allergies? No   Changes to medications? No   New conditions since last clinic visit? No   Unplanned office visit, urgent care, ED, or hospital admission in the last 4 weeks? No   How does patient/caregiver feel medication is working? Too soon to tell   Financial problems or insurance changes? No   How many doses of your specialty medications were missed in the last 4 weeks? 0   Would patient like to speak to a pharmacist? No   When does the patient need to receive the medication? 05/20/22   Refill Delivery Questions    How will the patient receive the medication? Mail   When does the patient need to receive the medication? 05/20/22   Shipping Address Home   Address in [facility] confirmed and updated if neccessary? Yes   Expected Copay ($) 0   Is the patient able to afford the medication copay? Yes   Payment Method zero copay   Days supply of Refill 28   Supplies needed? No supplies needed   Refill activity completed? Yes   Refill activity plan Refill scheduled   Shipment/Pickup Date: 05/12/22          Current Outpatient Medications   Medication Sig    abatacept (ORENCIA CLICKJECT) 125 mg/mL AtIn Inject 125 mg (1 pen) into the skin every 7 days.    amLODIPine (NORVASC) 5 MG tablet amlodipine 5 mg tablet   TAKE 1 TABLET BY MOUTH EVERY DAY    atorvastatin (LIPITOR) 20 MG tablet atorvastatin 20 mg tablet   TAKE 1 TABLET BY MOUTH EVERY DAY    fluticasone  propionate (FLONASE) 50 mcg/actuation nasal spray fluticasone propionate 50 mcg/actuation nasal spray,suspension   SPRAY 2 SPRAYS INTO EACH NOSTRIL EVERY DAY    folic acid (FOLVITE) 1 MG tablet Take 1 tablet (1 mg total) by mouth once daily.    methotrexate, PF, (RASUVO, PF,) 25 mg/0.5 mL AtIn Inject 0.5 mLs (25 mg total) into the skin every 7 days.    predniSONE (DELTASONE) 5 MG tablet Take 10mg until seen in clinic    sertraline (ZOLOFT) 50 MG tablet Take 50 mg by mouth once daily.    telmisartan-hydrochlorothiazide (MICARDIS HCT) 80-25 mg per tablet telmisartan 80 mg-hydrochlorothiazide 25 mg tablet   TAKE 1 TABLET BY MOUTH EVERY DAY    venlafaxine (EFFEXOR) 37.5 MG Tab Take 37.5 mg by mouth.   Last reviewed on 4/18/2022 10:21 AM by Ramya Waters PharmD    Review of patient's allergies indicates:  No Known Allergies Last reviewed on  5/10/2022 11:58 AM by Itz Sanchez      Tasks added this encounter   No tasks added.   Tasks due within next 3 months   No tasks due.     Debra Harper, PharmD  [facility]  [address] [place] [address]  Phone: [phone]  Fax: [phone]

## 2022-05-24 ENCOUNTER — SPECIALTY PHARMACY (OUTPATIENT)
Dept: PHARMACY | Facility: CLINIC | Age: 52
End: 2022-05-24

## 2022-05-24 DIAGNOSIS — M06.9 RHEUMATOID ARTHRITIS, INVOLVING UNSPECIFIED SITE, UNSPECIFIED WHETHER RHEUMATOID FACTOR PRESENT: Primary | ICD-10-CM

## 2022-05-24 NOTE — TELEPHONE ENCOUNTER
Specialty Pharmacy - Refill Coordination    Specialty Medication Orders Linked to Encounter    Flowsheet Row Most Recent Value   Medication #1 abatacept (ORENCIA CLICKJECT) 125 mg/mL AtIn (Order#[id number], Rx#[id number])          Refill Questions - Documented Responses    Flowsheet Row Most Recent Value   Patient Availability and HIPAA Verification    Does patient want to proceed with activity? Yes   HIPAA/medical authority confirmed? Yes   Relationship to patient of person spoken to? Spouse/Significant Other  [Bree, wife]   Refill Screening Questions    Changes to allergies? No   Changes to medications? No   New conditions since last clinic visit? No   Unplanned office visit, urgent care, ED, or hospital admission in the last 4 weeks? No   How does patient/caregiver feel medication is working? Good   Financial problems or insurance changes? No   How many doses of your specialty medications were missed in the last 4 weeks? 0   Would patient like to speak to a pharmacist? No   When does the patient need to receive the medication? 05/31/22   Refill Delivery Questions    How will the patient receive the medication? Mail   When does the patient need to receive the medication? 05/31/22   Shipping Address Home   Address in [facility] confirmed and updated if neccessary? Yes   Expected Copay ($) 0   Is the patient able to afford the medication copay? Yes   Payment Method zero copay   Days supply of Refill 28   Supplies needed? No supplies needed   Refill activity completed? Yes   Refill activity plan Refill scheduled   Shipment/Pickup Date: 05/30/22          Current Outpatient Medications   Medication Sig    abatacept (ORENCIA CLICKJECT) 125 mg/mL AtIn Inject 125 mg (1 pen) into the skin every 7 days.    amLODIPine (NORVASC) 5 MG tablet amlodipine 5 mg tablet   TAKE 1 TABLET BY MOUTH EVERY DAY    atorvastatin (LIPITOR) 20 MG tablet atorvastatin 20 mg tablet   TAKE 1 TABLET BY MOUTH EVERY DAY    fluticasone  propionate (FLONASE) 50 mcg/actuation nasal spray fluticasone propionate 50 mcg/actuation nasal spray,suspension   SPRAY 2 SPRAYS INTO EACH NOSTRIL EVERY DAY    folic acid (FOLVITE) 1 MG tablet Take 1 tablet (1 mg total) by mouth once daily.    methotrexate, PF, (RASUVO, PF,) 25 mg/0.5 mL AtIn Inject 0.5 mLs (25 mg total) into the skin every 7 days.    predniSONE (DELTASONE) 5 MG tablet Take 10mg until seen in clinic    sertraline (ZOLOFT) 50 MG tablet Take 50 mg by mouth once daily.    telmisartan-hydrochlorothiazide (MICARDIS HCT) 80-25 mg per tablet telmisartan 80 mg-hydrochlorothiazide 25 mg tablet   TAKE 1 TABLET BY MOUTH EVERY DAY    venlafaxine (EFFEXOR) 37.5 MG Tab Take 37.5 mg by mouth.   Last reviewed on 4/18/2022 10:21 AM by Ramya Waters PharmD    Review of patient's allergies indicates:  No Known Allergies Last reviewed on  5/10/2022 11:58 AM by Itz Sanchez      Tasks added this encounter   No tasks added.   Tasks due within next 3 months   5/24/2022 - Refill Call (Auto Added)  6/4/2022 - Refill Call (Auto Added)     [facility]  [facility] [address]  [facility] [address]  Phone: [phone]  Fax: [phone]

## 2022-06-07 ENCOUNTER — SPECIALTY PHARMACY (OUTPATIENT)
Dept: PHARMACY | Facility: CLINIC | Age: 52
End: 2022-06-07

## 2022-06-07 NOTE — TELEPHONE ENCOUNTER
Incoming call from patient's spouse for Rasuvo refill. Pt in need of refill by 6/18/22. Test claim shows $511.96 copay and the message:    100% PATIENT COINSURANCE([facility]) - PATIENT   SHOULD CONTACT HEALTH INSURANCE PLAN CRISTINA   H QUESTIONS    COPAY CARD ELIGIBLE     Pt's copay last month was $0 under same plan. Per March 2022 BI, the pt had already met max OOP for the year. I informed her due to drastic change [facility] may perform a benefits investigation and return a call for her. Pt's wife authorized permission to obtain copay card if need be. Forwarded smartset to BI queue and notified [facility] rheum team.

## 2022-06-08 ENCOUNTER — OFFICE VISIT (OUTPATIENT)
Dept: RHEUMATOLOGY | Facility: CLINIC | Age: 52
End: 2022-06-08
Payer: MEDICAID

## 2022-06-08 ENCOUNTER — LAB VISIT (OUTPATIENT)
Dept: LAB | Facility: HOSPITAL | Age: 52
End: 2022-06-08
Payer: MEDICAID

## 2022-06-08 VITALS
HEART RATE: 98 BPM | DIASTOLIC BLOOD PRESSURE: 99 MMHG | HEIGHT: 66 IN | SYSTOLIC BLOOD PRESSURE: 139 MMHG | BODY MASS INDEX: 41.06 KG/M2 | WEIGHT: 255.5 LBS

## 2022-06-08 DIAGNOSIS — M06.9 RHEUMATOID ARTHRITIS, INVOLVING UNSPECIFIED SITE, UNSPECIFIED WHETHER RHEUMATOID FACTOR PRESENT: Primary | ICD-10-CM

## 2022-06-08 DIAGNOSIS — I10 ESSENTIAL HYPERTENSION: ICD-10-CM

## 2022-06-08 DIAGNOSIS — E87.5 HYPERKALEMIA: ICD-10-CM

## 2022-06-08 DIAGNOSIS — M06.9 RHEUMATOID ARTHRITIS, INVOLVING UNSPECIFIED SITE, UNSPECIFIED WHETHER RHEUMATOID FACTOR PRESENT: ICD-10-CM

## 2022-06-08 DIAGNOSIS — M25.50 ARTHRALGIA, UNSPECIFIED JOINT: ICD-10-CM

## 2022-06-08 DIAGNOSIS — M13.0 POLYARTHRITIS: ICD-10-CM

## 2022-06-08 DIAGNOSIS — M60.9 MYOSITIS, UNSPECIFIED MYOSITIS TYPE, UNSPECIFIED SITE: ICD-10-CM

## 2022-06-08 LAB
ALBUMIN SERPL BCP-MCNC: 4.6 G/DL (ref 3.5–5.2)
ALP SERPL-CCNC: 87 U/L (ref 55–135)
ALT SERPL W/O P-5'-P-CCNC: 35 U/L (ref 10–44)
ANION GAP SERPL CALC-SCNC: 14 MMOL/L (ref 8–16)
AST SERPL-CCNC: 24 U/L (ref 10–40)
BASOPHILS # BLD AUTO: 0.05 K/UL (ref 0–0.2)
BASOPHILS # BLD AUTO: 0.05 K/UL (ref 0–0.2)
BASOPHILS NFR BLD: 0.4 % (ref 0–1.9)
BASOPHILS NFR BLD: 0.4 % (ref 0–1.9)
BILIRUB SERPL-MCNC: 0.9 MG/DL (ref 0.1–1)
BUN SERPL-MCNC: 8 MG/DL (ref 6–20)
CALCIUM SERPL-MCNC: 10 MG/DL (ref 8.7–10.5)
CHLORIDE SERPL-SCNC: 101 MMOL/L (ref 95–110)
CO2 SERPL-SCNC: 23 MMOL/L (ref 23–29)
CREAT SERPL-MCNC: 0.9 MG/DL (ref 0.5–1.4)
CRP SERPL-MCNC: 3.2 MG/L (ref 0–8.2)
CRP SERPL-MCNC: 3.2 MG/L (ref 0–8.2)
DIFFERENTIAL METHOD: ABNORMAL
DIFFERENTIAL METHOD: ABNORMAL
EOSINOPHIL # BLD AUTO: 0 K/UL (ref 0–0.5)
EOSINOPHIL # BLD AUTO: 0 K/UL (ref 0–0.5)
EOSINOPHIL NFR BLD: 0.4 % (ref 0–8)
EOSINOPHIL NFR BLD: 0.4 % (ref 0–8)
ERYTHROCYTE [DISTWIDTH] IN BLOOD BY AUTOMATED COUNT: 13.3 % (ref 11.5–14.5)
ERYTHROCYTE [DISTWIDTH] IN BLOOD BY AUTOMATED COUNT: 13.3 % (ref 11.5–14.5)
ERYTHROCYTE [SEDIMENTATION RATE] IN BLOOD BY WESTERGREN METHOD: 26 MM/HR (ref 0–23)
ERYTHROCYTE [SEDIMENTATION RATE] IN BLOOD BY WESTERGREN METHOD: 26 MM/HR (ref 0–23)
EST. GFR  (AFRICAN AMERICAN): >60 ML/MIN/1.73 M^2
EST. GFR  (NON AFRICAN AMERICAN): >60 ML/MIN/1.73 M^2
GLUCOSE SERPL-MCNC: 97 MG/DL (ref 70–110)
HCT VFR BLD AUTO: 51.4 % (ref 40–54)
HCT VFR BLD AUTO: 51.4 % (ref 40–54)
HGB BLD-MCNC: 17.3 G/DL (ref 14–18)
HGB BLD-MCNC: 17.3 G/DL (ref 14–18)
IMM GRANULOCYTES # BLD AUTO: 0.03 K/UL (ref 0–0.04)
IMM GRANULOCYTES # BLD AUTO: 0.03 K/UL (ref 0–0.04)
IMM GRANULOCYTES NFR BLD AUTO: 0.3 % (ref 0–0.5)
IMM GRANULOCYTES NFR BLD AUTO: 0.3 % (ref 0–0.5)
LYMPHOCYTES # BLD AUTO: 2.2 K/UL (ref 1–4.8)
LYMPHOCYTES # BLD AUTO: 2.2 K/UL (ref 1–4.8)
LYMPHOCYTES NFR BLD: 19.6 % (ref 18–48)
LYMPHOCYTES NFR BLD: 19.6 % (ref 18–48)
MAGNESIUM SERPL-MCNC: 2 MG/DL (ref 1.6–2.6)
MCH RBC QN AUTO: 31.7 PG (ref 27–31)
MCH RBC QN AUTO: 31.7 PG (ref 27–31)
MCHC RBC AUTO-ENTMCNC: 33.7 G/DL (ref 32–36)
MCHC RBC AUTO-ENTMCNC: 33.7 G/DL (ref 32–36)
MCV RBC AUTO: 94 FL (ref 82–98)
MCV RBC AUTO: 94 FL (ref 82–98)
MONOCYTES # BLD AUTO: 0.7 K/UL (ref 0.3–1)
MONOCYTES # BLD AUTO: 0.7 K/UL (ref 0.3–1)
MONOCYTES NFR BLD: 6.4 % (ref 4–15)
MONOCYTES NFR BLD: 6.4 % (ref 4–15)
NEUTROPHILS # BLD AUTO: 8.2 K/UL (ref 1.8–7.7)
NEUTROPHILS # BLD AUTO: 8.2 K/UL (ref 1.8–7.7)
NEUTROPHILS NFR BLD: 72.9 % (ref 38–73)
NEUTROPHILS NFR BLD: 72.9 % (ref 38–73)
NRBC BLD-RTO: 0 /100 WBC
NRBC BLD-RTO: 0 /100 WBC
PHOSPHATE SERPL-MCNC: 3.4 MG/DL (ref 2.7–4.5)
PLATELET # BLD AUTO: 259 K/UL (ref 150–450)
PLATELET # BLD AUTO: 259 K/UL (ref 150–450)
PMV BLD AUTO: 11.8 FL (ref 9.2–12.9)
PMV BLD AUTO: 11.8 FL (ref 9.2–12.9)
POTASSIUM SERPL-SCNC: 3.9 MMOL/L (ref 3.5–5.1)
PROT SERPL-MCNC: 8.2 G/DL (ref 6–8.4)
PTH-INTACT SERPL-MCNC: 80.6 PG/ML (ref 9–77)
RBC # BLD AUTO: 5.45 M/UL (ref 4.6–6.2)
RBC # BLD AUTO: 5.45 M/UL (ref 4.6–6.2)
SODIUM SERPL-SCNC: 138 MMOL/L (ref 136–145)
WBC # BLD AUTO: 11.3 K/UL (ref 3.9–12.7)
WBC # BLD AUTO: 11.3 K/UL (ref 3.9–12.7)

## 2022-06-08 PROCEDURE — 86682 HELMINTH ANTIBODY: CPT

## 2022-06-08 PROCEDURE — 84100 ASSAY OF PHOSPHORUS: CPT

## 2022-06-08 PROCEDURE — 83970 ASSAY OF PARATHORMONE: CPT

## 2022-06-08 PROCEDURE — 99999 PR PBB SHADOW E&M-EST. PATIENT-LVL III: ICD-10-PCS | Mod: PBBFAC,,,

## 2022-06-08 PROCEDURE — 85652 RBC SED RATE AUTOMATED: CPT

## 2022-06-08 PROCEDURE — 86140 C-REACTIVE PROTEIN: CPT

## 2022-06-08 PROCEDURE — 99214 OFFICE O/P EST MOD 30 MIN: CPT | Mod: S$PBB,,,

## 2022-06-08 PROCEDURE — 36415 COLL VENOUS BLD VENIPUNCTURE: CPT

## 2022-06-08 PROCEDURE — 87340 HEPATITIS B SURFACE AG IA: CPT

## 2022-06-08 PROCEDURE — 86790 VIRUS ANTIBODY NOS: CPT

## 2022-06-08 PROCEDURE — 99213 OFFICE O/P EST LOW 20 MIN: CPT | Mod: PBBFAC

## 2022-06-08 PROCEDURE — 86803 HEPATITIS C AB TEST: CPT

## 2022-06-08 PROCEDURE — 83735 ASSAY OF MAGNESIUM: CPT

## 2022-06-08 PROCEDURE — 86706 HEP B SURFACE ANTIBODY: CPT

## 2022-06-08 PROCEDURE — 82550 ASSAY OF CK (CPK): CPT

## 2022-06-08 PROCEDURE — 86704 HEP B CORE ANTIBODY TOTAL: CPT

## 2022-06-08 PROCEDURE — 80053 COMPREHEN METABOLIC PANEL: CPT

## 2022-06-08 PROCEDURE — 99999 PR PBB SHADOW E&M-EST. PATIENT-LVL III: CPT | Mod: PBBFAC,,,

## 2022-06-08 PROCEDURE — 99214 PR OFFICE/OUTPT VISIT, EST, LEVL IV, 30-39 MIN: ICD-10-PCS | Mod: S$PBB,,,

## 2022-06-08 PROCEDURE — 85025 COMPLETE CBC W/AUTO DIFF WBC: CPT

## 2022-06-08 RX ORDER — BUDESONIDE 1 MG/2ML
INHALANT ORAL
COMMUNITY

## 2022-06-08 RX ORDER — BUSPIRONE HYDROCHLORIDE 10 MG/1
10 TABLET ORAL 2 TIMES DAILY
COMMUNITY
Start: 2022-05-24

## 2022-06-08 RX ORDER — METHOTREXATE 25 MG/.5ML
25 INJECTION, SOLUTION SUBCUTANEOUS
Qty: 4 EACH | Refills: 3 | Status: SHIPPED | OUTPATIENT
Start: 2022-06-08 | End: 2022-06-08

## 2022-06-08 RX ORDER — ABATACEPT 125 MG/ML
125 INJECTION, SOLUTION SUBCUTANEOUS
Qty: 4 ML | Refills: 2 | Status: SHIPPED | OUTPATIENT
Start: 2022-06-08 | End: 2022-09-29

## 2022-06-08 RX ORDER — ABATACEPT 125 MG/ML
125 INJECTION, SOLUTION SUBCUTANEOUS
Qty: 4 ML | Refills: 2 | Status: SHIPPED | OUTPATIENT
Start: 2022-06-08 | End: 2022-06-08

## 2022-06-08 RX ORDER — BUDESONIDE 0.5 MG/2ML
2 INHALANT ORAL
COMMUNITY

## 2022-06-08 RX ORDER — METHOTREXATE 25 MG/.5ML
25 INJECTION, SOLUTION SUBCUTANEOUS
Qty: 4 EACH | Refills: 3 | Status: SHIPPED | OUTPATIENT
Start: 2022-06-08 | End: 2022-10-31 | Stop reason: SDUPTHER

## 2022-06-08 RX ORDER — LEFLUNOMIDE 10 MG/1
10 TABLET ORAL DAILY
Qty: 90 TABLET | Refills: 3 | Status: SHIPPED | OUTPATIENT
Start: 2022-06-08 | End: 2022-07-20

## 2022-06-08 ASSESSMENT — ROUTINE ASSESSMENT OF PATIENT INDEX DATA (RAPID3)
MDHAQ FUNCTION SCORE: 2.1
WHEN YOU AWAKENED IN THE MORNING OVER THE LAST WEEK, PLEASE INDICATE THE AMOUNT OF TIME IT TAKES UNTIL YOU ARE AS LIMBER AS YOU WILL BE FOR THE DAY: 1 HOUR
PAIN SCORE: 7
PATIENT GLOBAL ASSESSMENT SCORE: 5
PSYCHOLOGICAL DISTRESS SCORE: 5.5
TOTAL RAPID3 SCORE: 6.33
FATIGUE SCORE: 8

## 2022-06-08 NOTE — PROGRESS NOTES
Subjective:       Patient ID: Joaquín Rod is a 51 y.o. male.    Chief Complaint: Disease Management       50 year old CM  He has been told he has rheumatoid, SLE, sjogrens, and treated for lyme in [place] last summer  Right shoulder, left knee, left elbow  Pain started 1 year ago, they get warm and swollen      Piano key sign  Medial epicondylitis      PMH: HTN, HLD, Anxiety  PSH: Inguinal hernia 12 years ago, C-scope 2020  FH: Sister with DM, Mother and father with DM, Mother's side grandfather had RA along with grandfather's brother with RA  SH: does chewing tobacco, never a smoker, denies alcohol, no drugs  Allergies: none     Interval History:  3/12/21:  Patient has been on Prednisone 10mg with some improvement of his joint pain. His left elbow still bothers him.   Never been treated for positive strongy ab, will treat        6/11/21:  Inflammatory markers previously were negative 3 months ago, myomarker showed weakly positive PL-12.   Two days ago had swelling in left hand, it has improved today.   Had Chest CT negative for ILD  MRI thighs negative for myositis  Taking MTX 2 tabs am and 2 tabs pm on every Saturday with daily folic acid     7/21/21:  Takes MTX on sat(5 tabs am and 5 tabs in pm) along daily folic acid  Taking Prednisone 10mg daily  He has been feeling really well and noticed a night and day improvement since being on the 10 tabs dose about 2 weeks ago  He is tolerating the medicine well.    10/27/21:  Anti-CarP positive at 22 (normal <20)  Stopped Prednisone Mid August 2021  Had return of joint pains and aches since stopping, he does feel the MTX helps, which he takes on every Saturday with daily folic acid    12/8/21:  Patient had a right biceps tendon rupture 1 month ago; his joint pains have improved with the enbrel but not quite 100%; he is still taking the Prednisone 10mg;  Its been 4 weeks on Enbrel    3/16/22:  Prednisone 10mg daily, Enbrel once weekly, and MTX 10 tabs with  "total dose of 25mg as split dosing on every Saturday along with daily folic acid  Had gotten COVID January, held meds, restarted Mid Feb;   He states that he feels he has gotten worse, he has trouble driving due to his stiffness, he can no longer work due to his joint pains and swelling; He is unable to even play the piano at [employer] due to swelling his in hands after playing just a few hymns. It has severely impacted his daily living and how he describes it, everything that "made me me" he is unable to do/be. His daughters wedding is coming up this sat and he is worried about not being able to help out.     6/8/22:  Started Orencia 2 months ago;  Taking MTX injectible;  Taking Prednisone 10mg;  He still had joint pains and swelling but it is better than the enbrel;  He has on and off rashes on the back, I referred him to derm but he is hesitant to go due to cost; He also had multiple falls and uses a cane to walk; denies any complications from falls      Review of Systems   Constitutional: Negative for chills, fever and unexpected weight change.   HENT: Negative for mouth sores and trouble swallowing.    Eyes: Negative for redness and visual disturbance.   Respiratory: Positive for shortness of breath. Negative for cough and chest tightness.    Cardiovascular: Negative for chest pain.   Gastrointestinal: Negative for constipation and diarrhea.   Genitourinary: Negative for genital sores.   Musculoskeletal: Positive for arthralgias and joint swelling.   Skin: Negative for rash.   Neurological: Negative for weakness and headaches.   Hematological: Does not bruise/bleed easily.   Psychiatric/Behavioral: Negative for confusion.         Objective:   BP (!) 139/99   Pulse 98   Ht 5' 6" (1.676 m)   Wt 115.9 kg (255 lb 8.2 oz)   BMI 41.24 kg/m²      Physical Exam   Constitutional: He is oriented to person, place, and time. No distress.   HENT:   Head: Normocephalic and atraumatic.   Mouth/Throat: Oropharynx is clear and " moist.   Eyes: Pupils are equal, round, and reactive to light. Conjunctivae are normal.   Cardiovascular: Normal rate, regular rhythm and normal heart sounds.   Pulmonary/Chest: Effort normal and breath sounds normal. No respiratory distress. He has no wheezes.   Abdominal: There is no abdominal tenderness.   Musculoskeletal:         General: Tenderness present. No deformity.      Comments: Refer to joint exam   Neurological: He is alert and oriented to person, place, and time. Gait normal.   Skin: Skin is warm and dry. No rash noted. He is not diaphoretic.   Psychiatric: Mood, memory, affect and judgment normal.   Vitals reviewed.          10/27/2021 12/8/2021 3/16/2022 6/8/2022   Tender (JOSEPH-28) 23 / 28 21 / 28 21 / 28 24 / 28    Swollen (JOSEPH-28) 23 / 28  14 / 28  10 / 28  21 / 28    Provider Global -- -- 40 mm --   Patient Global -- -- 50 mm --   ESR -- -- 23 mm/hr --   CRP -- -- 2.6 mg/L --   JOSEPH-28 (ESR) -- -- 6.35 (High disease activity) --   JOSEPH-28 (CRP) -- -- 5.57 (High disease activity) --   CDAI Score -- -- 40  --        Assessment:       1. Rheumatoid arthritis, involving unspecified site, unspecified whether rheumatoid factor present    2. Polyarthritis    3. Essential hypertension            Plan:       Problem List Items Addressed This Visit        Cardiac/Vascular    Essential hypertension       Immunology/Multi System    Rheumatoid disease - Primary       Orthopedic    Polyarthritis            50 year old CM with a PMH of HTN, HLD, and Anxiety presents to clinic for 2nd opinion. Patient has joint pain that started 1 year ago and has gotten worse over time with swelling and pain. His main joints effected are hands, left elbow, left knee, and right shoulder.     Discrepancy between care-everywhere RF and what is mentioned in outside notes  Patient does have synovitis on exam  Pre-DMARDs negative except for Andres(treated 3/2021)  xrays hands, knees, elbow L, and shoulders without erosions   CT  chest without ILD  MRI femurs negative for myositis  PFT with mild restriction  Anti-CarP positive    Med History:  MTX tablets - not effective  Enbrel - not effective, tried for few months    Assessment:  Seronegative RA  Right Biceps tendon rupture - stable    Rheumatoid Arthritis Treatment Goals:  -Disease Activity Measure: 6.3 high  -Target: Low or Remission  -Patient Goal: Would like to function as he once did, play the piano at [employer]  -Therapy/Change: changing enbrel to orencia and injectable MTX  -Shared Decision Making: Discussed goals of care and therapy plan together with patient as outlined above.     Plan:  -Prednisone 10mg while waiting for improvement  -Continue Orencia 125mg q weekly  -Continue Injectible MTX total dose of 25mg on every Saturday along with daily folic acid  -Adding Arava 10mg daily, given patient hand out      RTC 6-8 weeks with Dr. Mcgovern and new fellow

## 2022-06-09 LAB
CK SERPL-CCNC: 169 U/L (ref 20–200)
HBV CORE AB SERPL QL IA: NEGATIVE
HBV SURFACE AB SER-ACNC: NEGATIVE M[IU]/ML
HBV SURFACE AG SERPL QL IA: NEGATIVE
HCV AB SERPL QL IA: NEGATIVE

## 2022-06-09 NOTE — TELEPHONE ENCOUNTER
Benefit Investigation (for [employer])     Drug Name:Rasuvo   Insurance per (Aileen)   Deductible: $400 (Rep Could not disclose)    Max OOP: $775 (Rep Could not disclose)   Estimated copay $511.96 Due to pt in Delfina Period at 100% contracted price.   [facility] is in Network  Copay Card Eligible    Forwarding to FA

## 2022-06-10 LAB
HAV IGG SER QL IA: NEGATIVE
STRONGYLOIDES ANTIBODY IGG: POSITIVE

## 2022-06-10 NOTE — TELEPHONE ENCOUNTER
Outgoing call to [facility] to question why the copay for Rasuvo was so high and stating why the plan says pt is in a agustin period.  Rep Sabrina stated she was unable to locate active coverage for the member and for member to call member services.    Outgoing call to pt to inform him insurance was unable to find active coverage.  Pt's wife stated he has recently retired and has new insurance.  Wife will call back with card info when she locates the new card.  Told pt [facility] will process new card and complete a PA if required.

## 2022-06-11 NOTE — PROGRESS NOTES
Itz, please check with  ID about the + Strongyloides previously treated.   Do we need to collect stool O& P or simply  retreat empirically? Let me know what they say RJJANESSA

## 2022-06-13 RX ORDER — METHOTREXATE 25 MG/.5ML
25 INJECTION, SOLUTION SUBCUTANEOUS
Qty: 4 EACH | Refills: 3 | Status: CANCELLED | OUTPATIENT
Start: 2022-06-13

## 2022-06-13 NOTE — TELEPHONE ENCOUNTER
Patient's wife returned call for Rasuvo Rx. Found new Rx from provider sent 6/8. $0 copay, BI/FA not required. Set up refill, patient due 6/18.     Vladimir Elam, PharmD  Clinical Pharmacist  [facility]  P: [phone]

## 2022-06-13 NOTE — TELEPHONE ENCOUNTER
Specialty Pharmacy - Refill Coordination  Specialty Pharmacy - Medication/Referral Authorization    Specialty Medication Orders Linked to Encounter    Flowsheet Row Most Recent Value   Medication #1 methotrexate, PF, (RASUVO, PF,) 25 mg/0.5 mL AtIn (Order#[id number], Rx#[id number])          Refill Questions - Documented Responses    Flowsheet Row Most Recent Value   Patient Availability and HIPAA Verification    Does patient want to proceed with activity? Yes   HIPAA/medical authority confirmed? Yes   Relationship to patient of person spoken to? Spouse/Significant Other   Refill Screening Questions    Changes to allergies? No   Changes to medications? No   New conditions since last clinic visit? No   Unplanned office visit, urgent care, ED, or hospital admission in the last 4 weeks? No   How does patient/caregiver feel medication is working? Excellent   Financial problems or insurance changes? No   How many doses of your specialty medications were missed in the last 4 weeks? 0   Would patient like to speak to a pharmacist? No   When does the patient need to receive the medication? 06/18/22   Refill Delivery Questions    How will the patient receive the medication? Mail   When does the patient need to receive the medication? 06/18/22   Shipping Address Home   Address in [facility] confirmed and updated if neccessary? Yes   Expected Copay ($) 0   Is the patient able to afford the medication copay? Yes   Payment Method zero copay   Days supply of Refill 28   Supplies needed? No supplies needed   Refill activity completed? Yes   Refill activity plan Refill scheduled   Shipment/Pickup Date: 06/15/22          Current Outpatient Medications   Medication Sig    abatacept (ORENCIA CLICKJECT) 125 mg/mL AtIn Inject 125 mg (1 pen) into the skin every 7 days.    amLODIPine (NORVASC) 5 MG tablet amlodipine 5 mg tablet   TAKE 1 TABLET BY MOUTH EVERY DAY    atorvastatin (LIPITOR) 20 MG tablet atorvastatin 20 mg tablet    TAKE 1 TABLET BY MOUTH EVERY DAY    budesonide (PULMICORT) 0.5 mg/2 mL nebulizer solution 2 mLs.    budesonide 1 mg/2 mL NbS budesonide 1 mg/2 mL suspension for nebulization   INHALE 2 ML TWICE A DAY BY NEBULIZATION ROUTE AS NEEDED.    busPIRone (BUSPAR) 10 MG tablet Take 10 mg by mouth 2 (two) times daily.    fluticasone propionate (FLONASE) 50 mcg/actuation nasal spray fluticasone propionate 50 mcg/actuation nasal spray,suspension   SPRAY 2 SPRAYS INTO EACH NOSTRIL EVERY DAY    folic acid (FOLVITE) 1 MG tablet Take 1 tablet (1 mg total) by mouth once daily.    leflunomide (ARAVA) 10 MG Tab Take 1 tablet (10 mg total) by mouth once daily.    methotrexate, PF, (RASUVO, PF,) 25 mg/0.5 mL AtIn Inject 0.5 mLs (25 mg total) into the skin every 7 days.    predniSONE (DELTASONE) 5 MG tablet Take 10mg until seen in clinic    telmisartan-hydrochlorothiazide (MICARDIS HCT) 80-25 mg per tablet telmisartan 80 mg-hydrochlorothiazide 25 mg tablet   TAKE 1 TABLET BY MOUTH EVERY DAY    venlafaxine (EFFEXOR) 37.5 MG Tab Take 37.5 mg by mouth.   Last reviewed on 6/8/2022  1:15 PM by Corinne Cundiff, MA    Review of patient's allergies indicates:  No Known Allergies Last reviewed on  6/8/2022 1:11 PM by Corinne Cundiff      Tasks added this encounter   7/9/2022 - Refill Call (Auto Added)   Tasks due within next 3 months   6/18/2022 - Refill Call (Auto Added)     Vladimir Elam, PharmD  [facility]  [address] [place] [id number]  Phone: [phone]  Fax: [phone]

## 2022-06-13 NOTE — TELEPHONE ENCOUNTER
Incoming call from patient's wife, she stated insurance should be good to go. She states  is due for injection .     Test claim pays for 0.00.  Messaging Leatha calvert alert.

## 2022-06-20 ENCOUNTER — SPECIALTY PHARMACY (OUTPATIENT)
Dept: PHARMACY | Facility: CLINIC | Age: 52
End: 2022-06-20

## 2022-06-20 ENCOUNTER — PATIENT MESSAGE (OUTPATIENT)
Dept: RHEUMATOLOGY | Facility: CLINIC | Age: 52
End: 2022-06-20

## 2022-06-20 NOTE — TELEPHONE ENCOUNTER
Patient's wife reported 2 doses of Orencia on hand for 6/21 and 6/28, so the patient will need a refill for 7/5. Informed her that [facility] will follow up next week for the refill.

## 2022-06-27 DIAGNOSIS — M06.9 RHEUMATOID ARTHRITIS, INVOLVING UNSPECIFIED SITE, UNSPECIFIED WHETHER RHEUMATOID FACTOR PRESENT: Primary | ICD-10-CM

## 2022-06-27 NOTE — TELEPHONE ENCOUNTER
Specialty Pharmacy - Refill Coordination    Specialty Medication Orders Linked to Encounter    Flowsheet Row Most Recent Value   Medication #1 abatacept (ORENCIA CLICKJECT) 125 mg/mL AtIn (Order#[id number], Rx#[id number])          Refill Questions - Documented Responses    Flowsheet Row Most Recent Value   Patient Availability and HIPAA Verification    Does patient want to proceed with activity? Yes   HIPAA/medical authority confirmed? Yes   Relationship to patient of person spoken to? Spouse/Significant Other   Refill Screening Questions    Changes to allergies? No   Changes to medications? No   New conditions since last clinic visit? No   Unplanned office visit, urgent care, ED, or hospital admission in the last 4 weeks? No   How does patient/caregiver feel medication is working? Very good   Financial problems or insurance changes? No   How many doses of your specialty medications were missed in the last 4 weeks? 0   Would patient like to speak to a pharmacist? No   When does the patient need to receive the medication? 07/05/22   Refill Delivery Questions    How will the patient receive the medication? Mail   When does the patient need to receive the medication? 07/05/22   Shipping Address Home   Address in [facility] confirmed and updated if neccessary? Yes   Expected Copay ($) 0   Is the patient able to afford the medication copay? Yes   Payment Method zero copay   Days supply of Refill 28   Supplies needed? --  [Injection Kit]   Refill activity completed? Yes   Refill activity plan Refill scheduled   Shipment/Pickup Date: 06/28/22          Current Outpatient Medications   Medication Sig    abatacept (ORENCIA CLICKJECT) 125 mg/mL AtIn Inject 125 mg (1 pen) into the skin every 7 days.    amLODIPine (NORVASC) 5 MG tablet amlodipine 5 mg tablet   TAKE 1 TABLET BY MOUTH EVERY DAY    atorvastatin (LIPITOR) 20 MG tablet atorvastatin 20 mg tablet   TAKE 1 TABLET BY MOUTH EVERY DAY    budesonide (PULMICORT)  0.5 mg/2 mL nebulizer solution 2 mLs.    budesonide 1 mg/2 mL NbS budesonide 1 mg/2 mL suspension for nebulization   INHALE 2 ML TWICE A DAY BY NEBULIZATION ROUTE AS NEEDED.    busPIRone (BUSPAR) 10 MG tablet Take 10 mg by mouth 2 (two) times daily.    fluticasone propionate (FLONASE) 50 mcg/actuation nasal spray fluticasone propionate 50 mcg/actuation nasal spray,suspension   SPRAY 2 SPRAYS INTO EACH NOSTRIL EVERY DAY    folic acid (FOLVITE) 1 MG tablet Take 1 tablet (1 mg total) by mouth once daily.    leflunomide (ARAVA) 10 MG Tab Take 1 tablet (10 mg total) by mouth once daily.    methotrexate, PF, (RASUVO, PF,) 25 mg/0.5 mL AtIn Inject 0.5 mLs (25 mg total) into the skin every 7 days.    predniSONE (DELTASONE) 5 MG tablet Take 10mg until seen in clinic    telmisartan-hydrochlorothiazide (MICARDIS HCT) 80-25 mg per tablet telmisartan 80 mg-hydrochlorothiazide 25 mg tablet   TAKE 1 TABLET BY MOUTH EVERY DAY    venlafaxine (EFFEXOR) 37.5 MG Tab Take 37.5 mg by mouth.   Last reviewed on 6/8/2022  1:15 PM by Corinne Cundiff, MA    Review of patient's allergies indicates:  No Known Allergies Last reviewed on  6/8/2022 1:11 PM by Corinne Cundiff      Tasks added this encounter   7/26/2022 - Refill Call (Auto Added)   Tasks due within next 3 months   7/9/2022 - Refill Call (Auto Added)     Alvina Arreaga, PharmD  [facility]  [address] [place] [id number]  Phone: [phone]  Fax: [phone]

## 2022-07-01 ENCOUNTER — TELEPHONE (OUTPATIENT)
Dept: RHEUMATOLOGY | Facility: CLINIC | Age: 52
End: 2022-07-01

## 2022-07-01 NOTE — TELEPHONE ENCOUNTER
[facility]  Ordered by Rosaline Gamboa MD  6/29/22:    HbA1C 6.1%  ESR 6  CRP 0.37 mg/dl  CMP nl  Except glucose 113  Creat 0.9 eGFR 161      HDL 51  LDL 90.8  CBC nl ANC 4500 ALC 2000    Please tell pt his labs are all fine. Thank you MARKOS

## 2022-07-05 ENCOUNTER — TELEPHONE (OUTPATIENT)
Dept: RHEUMATOLOGY | Facility: CLINIC | Age: 52
End: 2022-07-05

## 2022-07-11 ENCOUNTER — SPECIALTY PHARMACY (OUTPATIENT)
Dept: PHARMACY | Facility: CLINIC | Age: 52
End: 2022-07-11

## 2022-07-11 DIAGNOSIS — M06.9 RHEUMATOID ARTHRITIS, INVOLVING UNSPECIFIED SITE, UNSPECIFIED WHETHER RHEUMATOID FACTOR PRESENT: Primary | ICD-10-CM

## 2022-07-11 NOTE — TELEPHONE ENCOUNTER
Specialty Pharmacy - Refill Coordination    Specialty Medication Orders Linked to Encounter    Flowsheet Row Most Recent Value   Medication #1 methotrexate, PF, (RASUVO, PF,) 25 mg/0.5 mL AtIn (Order#[id number], Rx#[id number])          Refill Questions - Documented Responses    Flowsheet Row Most Recent Value   Patient Availability and HIPAA Verification    Does patient want to proceed with activity? Yes   HIPAA/medical authority confirmed? Yes   Relationship to patient of person spoken to? Spouse/Significant Other  [Bree, wife]   Refill Screening Questions    Changes to allergies? No   Changes to medications? No   New conditions since last clinic visit? No   Unplanned office visit, urgent care, ED, or hospital admission in the last 4 weeks? No   How does patient/caregiver feel medication is working? Good   Financial problems or insurance changes? No   How many doses of your specialty medications were missed in the last 4 weeks? 0   Would patient like to speak to a pharmacist? No   When does the patient need to receive the medication? 07/21/22   Refill Delivery Questions    How will the patient receive the medication? Mail   When does the patient need to receive the medication? 07/21/22   Shipping Address Home   Address in [facility] confirmed and updated if neccessary? Yes   Expected Copay ($) 0   Is the patient able to afford the medication copay? Yes   Payment Method zero copay   Days supply of Refill 28   Supplies needed? No supplies needed   Refill activity completed? Yes   Refill activity plan Refill scheduled   Shipment/Pickup Date: 07/20/22          Current Outpatient Medications   Medication Sig    abatacept (ORENCIA CLICKJECT) 125 mg/mL AtIn Inject 125 mg (1 pen) into the skin every 7 days.    amLODIPine (NORVASC) 5 MG tablet amlodipine 5 mg tablet   TAKE 1 TABLET BY MOUTH EVERY DAY    atorvastatin (LIPITOR) 20 MG tablet atorvastatin 20 mg tablet   TAKE 1 TABLET BY MOUTH EVERY DAY    budesonide  (PULMICORT) 0.5 mg/2 mL nebulizer solution 2 mLs.    budesonide 1 mg/2 mL [facility] budesonide 1 mg/2 mL suspension for nebulization   INHALE 2 ML TWICE A DAY BY NEBULIZATION ROUTE AS NEEDED.    busPIRone (BUSPAR) 10 MG tablet Take 10 mg by mouth 2 (two) times daily.    fluticasone propionate (FLONASE) 50 mcg/actuation nasal spray fluticasone propionate 50 mcg/actuation nasal spray,suspension   SPRAY 2 SPRAYS INTO EACH NOSTRIL EVERY DAY    folic acid (FOLVITE) 1 MG tablet Take 1 tablet (1 mg total) by mouth once daily.    leflunomide (ARAVA) 10 MG Tab Take 1 tablet (10 mg total) by mouth once daily.    methotrexate, PF, (RASUVO, PF,) 25 mg/0.5 mL AtIn Inject 0.5 mLs (25 mg total) into the skin every 7 days.    predniSONE (DELTASONE) 5 MG tablet Take 10mg until seen in clinic    telmisartan-hydrochlorothiazide (MICARDIS HCT) 80-25 mg per tablet telmisartan 80 mg-hydrochlorothiazide 25 mg tablet   TAKE 1 TABLET BY MOUTH EVERY DAY    venlafaxine (EFFEXOR) 37.5 MG Tab Take 37.5 mg by mouth.   Last reviewed on 6/8/2022  1:15 PM by Corinne Cundiff, MA    Review of patient's allergies indicates:  No Known Allergies Last reviewed on  6/8/2022 1:11 PM by Corinne Cundiff      Tasks added this encounter   No tasks added.   Tasks due within next 3 months   7/26/2022 - Refill Call (Auto Added)  7/9/2022 - Refill Call (Auto Added)     [facility]  [address] [place] [id number]  Phone: [phone]  Fax: [phone]

## 2022-07-20 ENCOUNTER — LAB VISIT (OUTPATIENT)
Dept: LAB | Facility: HOSPITAL | Age: 52
End: 2022-07-20
Payer: MEDICAID

## 2022-07-20 ENCOUNTER — OFFICE VISIT (OUTPATIENT)
Dept: RHEUMATOLOGY | Facility: CLINIC | Age: 52
End: 2022-07-20
Payer: MEDICAID

## 2022-07-20 VITALS
DIASTOLIC BLOOD PRESSURE: 84 MMHG | HEART RATE: 106 BPM | BODY MASS INDEX: 40.5 KG/M2 | HEIGHT: 66 IN | SYSTOLIC BLOOD PRESSURE: 131 MMHG | WEIGHT: 252 LBS

## 2022-07-20 DIAGNOSIS — M06.9 RHEUMATOID ARTHRITIS, INVOLVING UNSPECIFIED SITE, UNSPECIFIED WHETHER RHEUMATOID FACTOR PRESENT: ICD-10-CM

## 2022-07-20 DIAGNOSIS — M60.9 MYOSITIS, UNSPECIFIED MYOSITIS TYPE, UNSPECIFIED SITE: Primary | ICD-10-CM

## 2022-07-20 DIAGNOSIS — M60.9 MYOSITIS, UNSPECIFIED MYOSITIS TYPE, UNSPECIFIED SITE: ICD-10-CM

## 2022-07-20 LAB
ALBUMIN SERPL BCP-MCNC: 4.5 G/DL (ref 3.5–5.2)
ALBUMIN SERPL BCP-MCNC: 4.5 G/DL (ref 3.5–5.2)
ALP SERPL-CCNC: 83 U/L (ref 55–135)
ALP SERPL-CCNC: 83 U/L (ref 55–135)
ALT SERPL W/O P-5'-P-CCNC: 31 U/L (ref 10–44)
ALT SERPL W/O P-5'-P-CCNC: 31 U/L (ref 10–44)
ANION GAP SERPL CALC-SCNC: 9 MMOL/L (ref 8–16)
ANION GAP SERPL CALC-SCNC: 9 MMOL/L (ref 8–16)
AST SERPL-CCNC: 21 U/L (ref 10–40)
AST SERPL-CCNC: 21 U/L (ref 10–40)
BASOPHILS # BLD AUTO: 0.05 K/UL (ref 0–0.2)
BASOPHILS # BLD AUTO: 0.05 K/UL (ref 0–0.2)
BASOPHILS NFR BLD: 0.5 % (ref 0–1.9)
BASOPHILS NFR BLD: 0.5 % (ref 0–1.9)
BILIRUB SERPL-MCNC: 1.5 MG/DL (ref 0.1–1)
BILIRUB SERPL-MCNC: 1.5 MG/DL (ref 0.1–1)
BUN SERPL-MCNC: 10 MG/DL (ref 6–20)
BUN SERPL-MCNC: 10 MG/DL (ref 6–20)
CALCIUM SERPL-MCNC: 10 MG/DL (ref 8.7–10.5)
CALCIUM SERPL-MCNC: 10 MG/DL (ref 8.7–10.5)
CHLORIDE SERPL-SCNC: 103 MMOL/L (ref 95–110)
CHLORIDE SERPL-SCNC: 103 MMOL/L (ref 95–110)
CK SERPL-CCNC: 138 U/L (ref 20–200)
CO2 SERPL-SCNC: 26 MMOL/L (ref 23–29)
CO2 SERPL-SCNC: 26 MMOL/L (ref 23–29)
CREAT SERPL-MCNC: 0.8 MG/DL (ref 0.5–1.4)
CREAT SERPL-MCNC: 0.8 MG/DL (ref 0.5–1.4)
CRP SERPL-MCNC: 2.6 MG/L (ref 0–8.2)
CRP SERPL-MCNC: 2.6 MG/L (ref 0–8.2)
DIFFERENTIAL METHOD: ABNORMAL
DIFFERENTIAL METHOD: ABNORMAL
EOSINOPHIL # BLD AUTO: 0 K/UL (ref 0–0.5)
EOSINOPHIL # BLD AUTO: 0 K/UL (ref 0–0.5)
EOSINOPHIL NFR BLD: 0.3 % (ref 0–8)
EOSINOPHIL NFR BLD: 0.3 % (ref 0–8)
ERYTHROCYTE [DISTWIDTH] IN BLOOD BY AUTOMATED COUNT: 13.2 % (ref 11.5–14.5)
ERYTHROCYTE [DISTWIDTH] IN BLOOD BY AUTOMATED COUNT: 13.2 % (ref 11.5–14.5)
ERYTHROCYTE [SEDIMENTATION RATE] IN BLOOD BY PHOTOMETRIC METHOD: 24 MM/HR (ref 0–23)
ERYTHROCYTE [SEDIMENTATION RATE] IN BLOOD BY PHOTOMETRIC METHOD: 24 MM/HR (ref 0–23)
EST. GFR  (AFRICAN AMERICAN): >60 ML/MIN/1.73 M^2
EST. GFR  (AFRICAN AMERICAN): >60 ML/MIN/1.73 M^2
EST. GFR  (NON AFRICAN AMERICAN): >60 ML/MIN/1.73 M^2
EST. GFR  (NON AFRICAN AMERICAN): >60 ML/MIN/1.73 M^2
GLUCOSE SERPL-MCNC: 102 MG/DL (ref 70–110)
GLUCOSE SERPL-MCNC: 102 MG/DL (ref 70–110)
HCT VFR BLD AUTO: 51.2 % (ref 40–54)
HCT VFR BLD AUTO: 51.2 % (ref 40–54)
HGB BLD-MCNC: 17.2 G/DL (ref 14–18)
HGB BLD-MCNC: 17.2 G/DL (ref 14–18)
IMM GRANULOCYTES # BLD AUTO: 0.03 K/UL (ref 0–0.04)
IMM GRANULOCYTES # BLD AUTO: 0.03 K/UL (ref 0–0.04)
IMM GRANULOCYTES NFR BLD AUTO: 0.3 % (ref 0–0.5)
IMM GRANULOCYTES NFR BLD AUTO: 0.3 % (ref 0–0.5)
LDH SERPL L TO P-CCNC: 263 U/L (ref 110–260)
LYMPHOCYTES # BLD AUTO: 1.1 K/UL (ref 1–4.8)
LYMPHOCYTES # BLD AUTO: 1.1 K/UL (ref 1–4.8)
LYMPHOCYTES NFR BLD: 11.9 % (ref 18–48)
LYMPHOCYTES NFR BLD: 11.9 % (ref 18–48)
MCH RBC QN AUTO: 31.1 PG (ref 27–31)
MCH RBC QN AUTO: 31.1 PG (ref 27–31)
MCHC RBC AUTO-ENTMCNC: 33.6 G/DL (ref 32–36)
MCHC RBC AUTO-ENTMCNC: 33.6 G/DL (ref 32–36)
MCV RBC AUTO: 93 FL (ref 82–98)
MCV RBC AUTO: 93 FL (ref 82–98)
MONOCYTES # BLD AUTO: 1 K/UL (ref 0.3–1)
MONOCYTES # BLD AUTO: 1 K/UL (ref 0.3–1)
MONOCYTES NFR BLD: 10 % (ref 4–15)
MONOCYTES NFR BLD: 10 % (ref 4–15)
NEUTROPHILS # BLD AUTO: 7.3 K/UL (ref 1.8–7.7)
NEUTROPHILS # BLD AUTO: 7.3 K/UL (ref 1.8–7.7)
NEUTROPHILS NFR BLD: 77 % (ref 38–73)
NEUTROPHILS NFR BLD: 77 % (ref 38–73)
NRBC BLD-RTO: 0 /100 WBC
NRBC BLD-RTO: 0 /100 WBC
PLATELET # BLD AUTO: 266 K/UL (ref 150–450)
PLATELET # BLD AUTO: 266 K/UL (ref 150–450)
PMV BLD AUTO: 12.1 FL (ref 9.2–12.9)
PMV BLD AUTO: 12.1 FL (ref 9.2–12.9)
POTASSIUM SERPL-SCNC: 3.6 MMOL/L (ref 3.5–5.1)
POTASSIUM SERPL-SCNC: 3.6 MMOL/L (ref 3.5–5.1)
PROT SERPL-MCNC: 8.1 G/DL (ref 6–8.4)
PROT SERPL-MCNC: 8.1 G/DL (ref 6–8.4)
RBC # BLD AUTO: 5.53 M/UL (ref 4.6–6.2)
RBC # BLD AUTO: 5.53 M/UL (ref 4.6–6.2)
SODIUM SERPL-SCNC: 138 MMOL/L (ref 136–145)
SODIUM SERPL-SCNC: 138 MMOL/L (ref 136–145)
WBC # BLD AUTO: 9.52 K/UL (ref 3.9–12.7)
WBC # BLD AUTO: 9.52 K/UL (ref 3.9–12.7)

## 2022-07-20 PROCEDURE — 99214 OFFICE O/P EST MOD 30 MIN: CPT | Mod: S$PBB,,, | Performed by: INTERNAL MEDICINE

## 2022-07-20 PROCEDURE — 30000890 MISCELLANEOUS SENDOUT TEST, BLOOD: Performed by: INTERNAL MEDICINE

## 2022-07-20 PROCEDURE — 99213 OFFICE O/P EST LOW 20 MIN: CPT | Mod: PBBFAC | Performed by: INTERNAL MEDICINE

## 2022-07-20 PROCEDURE — 99214 PR OFFICE/OUTPT VISIT, EST, LEVL IV, 30-39 MIN: ICD-10-PCS | Mod: S$PBB,,, | Performed by: INTERNAL MEDICINE

## 2022-07-20 PROCEDURE — 85652 RBC SED RATE AUTOMATED: CPT | Performed by: INTERNAL MEDICINE

## 2022-07-20 PROCEDURE — 85025 COMPLETE CBC W/AUTO DIFF WBC: CPT | Performed by: INTERNAL MEDICINE

## 2022-07-20 PROCEDURE — 82085 ASSAY OF ALDOLASE: CPT | Performed by: INTERNAL MEDICINE

## 2022-07-20 PROCEDURE — 82550 ASSAY OF CK (CPK): CPT | Performed by: INTERNAL MEDICINE

## 2022-07-20 PROCEDURE — 99999 PR PBB SHADOW E&M-EST. PATIENT-LVL III: ICD-10-PCS | Mod: PBBFAC,,, | Performed by: INTERNAL MEDICINE

## 2022-07-20 PROCEDURE — 36415 COLL VENOUS BLD VENIPUNCTURE: CPT | Performed by: INTERNAL MEDICINE

## 2022-07-20 PROCEDURE — 80053 COMPREHEN METABOLIC PANEL: CPT | Performed by: INTERNAL MEDICINE

## 2022-07-20 PROCEDURE — 99999 PR PBB SHADOW E&M-EST. PATIENT-LVL III: CPT | Mod: PBBFAC,,, | Performed by: INTERNAL MEDICINE

## 2022-07-20 PROCEDURE — 83615 LACTATE (LD) (LDH) ENZYME: CPT | Performed by: INTERNAL MEDICINE

## 2022-07-20 PROCEDURE — 83516 IMMUNOASSAY NONANTIBODY: CPT | Performed by: INTERNAL MEDICINE

## 2022-07-20 PROCEDURE — 86140 C-REACTIVE PROTEIN: CPT | Performed by: INTERNAL MEDICINE

## 2022-07-20 RX ORDER — LEFLUNOMIDE 20 MG/1
20 TABLET ORAL DAILY
Qty: 90 TABLET | Refills: 3 | Status: SHIPPED | OUTPATIENT
Start: 2022-07-20 | End: 2022-08-01 | Stop reason: SDUPTHER

## 2022-07-20 ASSESSMENT — ROUTINE ASSESSMENT OF PATIENT INDEX DATA (RAPID3)
PSYCHOLOGICAL DISTRESS SCORE: 5.5
FATIGUE SCORE: 7
MDHAQ FUNCTION SCORE: 1.7
PATIENT GLOBAL ASSESSMENT SCORE: 5
WHEN YOU AWAKENED IN THE MORNING OVER THE LAST WEEK, PLEASE INDICATE THE AMOUNT OF TIME IT TAKES UNTIL YOU ARE AS LIMBER AS YOU WILL BE FOR THE DAY: 1
AM STIFFNESS SCORE: 1, YES
PAIN SCORE: 7
TOTAL RAPID3 SCORE: 5.89

## 2022-07-20 NOTE — PATIENT INSTRUCTIONS
Get your labs checked now, in four weeks, and in eight weeks. We have also ordered PFTs and an MRI of your legs. Increase Leflunomide to 20 mg daily.

## 2022-07-20 NOTE — PROGRESS NOTES
I have personally reviewed the history, confirmed exam findings, and discussed assessment and plan with fellow.      Bilateral shoulder and knee pain with + NADYA+ RF 8/24/20     Prednisone and methotrexate started 3/12/21 subsequently tapering prednisone and increasing methotrexate to 25mg once a week  Etanercept 11/8/21-3/16/22  abatacept sc started 3/16/22  leflunomide 10mg added 6/8/22    RF- ACPA- CarP+ RA TJ 13  SJ 8  Pt global 50 ESR 24 CRP 2.6  DAS28 5.74 down from  7.74, QGS00-TMJ 4.93 unchanged CDAI 28.5(HDA).  Rheumatoid Arthritis Treatment Goals:  -Disease Activity Measure: CDAI  -Target: LDA  -Patient Goal:  -Therapy/Change: pt feels improved with addition of leflunomide 10mg daily, will increase to 20mg daily and check standing labs in 4 wks.   -Shared Decision Making: Discussed goals of care and therapy plan together with patient as outlined above.     Proximal muscle weakness, previous MRI 4/14/21 bilateral thighs negative for myositis. 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

## 2022-07-22 LAB — ALDOLASE SERPL-CCNC: 5.1 U/L (ref 1.2–7.6)

## 2022-07-26 ENCOUNTER — SPECIALTY PHARMACY (OUTPATIENT)
Dept: PHARMACY | Facility: CLINIC | Age: 52
End: 2022-07-26

## 2022-07-26 DIAGNOSIS — M06.9 RHEUMATOID ARTHRITIS, INVOLVING UNSPECIFIED SITE, UNSPECIFIED WHETHER RHEUMATOID FACTOR PRESENT: Primary | ICD-10-CM

## 2022-07-26 NOTE — TELEPHONE ENCOUNTER
Specialty Pharmacy - Refill Coordination    Specialty Medication Orders Linked to Encounter    Flowsheet Row Most Recent Value   Medication #1 methotrexate, PF, (RASUVO, PF,) 25 mg/0.5 mL AtIn (Order#[id number], Rx#[id number])   Medication #2 abatacept (ORENCIA CLICKJECT) 125 mg/mL AtIn (Order#[id number], Rx#[id number])          Refill Questions - Documented Responses    Flowsheet Row Most Recent Value   Patient Availability and HIPAA Verification    Does patient want to proceed with activity? Yes   HIPAA/medical authority confirmed? Yes   Relationship to patient of person spoken to? Spouse/Significant Other  [Bree, wife]   Refill Screening Questions    Changes to allergies? No   Changes to medications? Yes  [Arava dose increased to 20 mg]   New conditions since last clinic visit? No   Unplanned office visit, urgent care, ED, or hospital admission in the last 4 weeks? No   How does patient/caregiver feel medication is working? Very good   Financial problems or insurance changes? No   How many doses of your specialty medications were missed in the last 4 weeks? 2   Why were doses missed? Felt ill or sick   Would patient like to speak to a pharmacist? No   When does the patient need to receive the medication? 07/27/22   Refill Delivery Questions    How will the patient receive the medication? Mail   When does the patient need to receive the medication? 07/27/22   Shipping Address Home   Address in [facility] confirmed and updated if neccessary? Yes   Expected Copay ($) 0   Is the patient able to afford the medication copay? Yes   Payment Method zero copay   Days supply of Refill 28   Supplies needed? Alcohol Swabs   Refill activity completed? Yes   Refill activity plan Refill scheduled   Shipment/Pickup Date: 07/26/22          Current Outpatient Medications   Medication Sig    abatacept (ORENCIA CLICKJECT) 125 mg/mL AtIn Inject 125 mg (1 pen) into the skin every 7 days.    amLODIPine (NORVASC) 5 MG tablet  amlodipine 5 mg tablet   TAKE 1 TABLET BY MOUTH EVERY DAY    atorvastatin (LIPITOR) 20 MG tablet atorvastatin 20 mg tablet   TAKE 1 TABLET BY MOUTH EVERY DAY    budesonide (PULMICORT) 0.5 mg/2 mL nebulizer solution 2 mLs.    budesonide 1 mg/2 mL [facility] budesonide 1 mg/2 mL suspension for nebulization   INHALE 2 ML TWICE A DAY BY NEBULIZATION ROUTE AS NEEDED.    busPIRone (BUSPAR) 10 MG tablet Take 10 mg by mouth 2 (two) times daily.    fluticasone propionate (FLONASE) 50 mcg/actuation nasal spray fluticasone propionate 50 mcg/actuation nasal spray,suspension   SPRAY 2 SPRAYS INTO EACH NOSTRIL EVERY DAY    folic acid (FOLVITE) 1 MG tablet Take 1 tablet (1 mg total) by mouth once daily.    leflunomide (ARAVA) 20 MG Tab Take 1 tablet (20 mg total) by mouth once daily.    methotrexate, PF, (RASUVO, PF,) 25 mg/0.5 mL AtIn Inject 0.5 mLs (25 mg total) into the skin every 7 days.    predniSONE (DELTASONE) 5 MG tablet Take 10mg until seen in clinic    telmisartan-hydrochlorothiazide (MICARDIS HCT) 80-25 mg per tablet telmisartan 80 mg-hydrochlorothiazide 25 mg tablet   TAKE 1 TABLET BY MOUTH EVERY DAY    venlafaxine (EFFEXOR) 37.5 MG Tab Take 37.5 mg by mouth.   Last reviewed on 7/20/2022 11:36 AM by Keesha Aviles MA    Review of patient's allergies indicates:  No Known Allergies Last reviewed on  7/20/2022 11:34 AM by Keesha Aviles    Interventions added this encounter   Closed: [facility] Patient Intervention   Patient stated that one dose of Orencia was held and two doses of Rasuvo were held due to the patient feeling under the weather. Patient is feeling better and advised it is okay to resume doses. Patient's wife states that they are able to tell when that the medication is working by the patient feeling better.   Tasks added this encounter   No tasks added.   Tasks due within next 3 months   7/26/2022 - Refill Call (Auto Added)  8/11/2022 - Refill Call (Auto Added)     Nuris Medina, [facility] -  Specialty Pharmacy  [facility] [address]  Phone: [phone]  Fax: [phone]

## 2022-07-29 ENCOUNTER — PATIENT MESSAGE (OUTPATIENT)
Dept: RHEUMATOLOGY | Facility: CLINIC | Age: 52
End: 2022-07-29

## 2022-08-02 ENCOUNTER — TELEPHONE (OUTPATIENT)
Dept: RHEUMATOLOGY | Facility: CLINIC | Age: 52
End: 2022-08-02

## 2022-08-02 ENCOUNTER — PATIENT MESSAGE (OUTPATIENT)
Dept: RHEUMATOLOGY | Facility: CLINIC | Age: 52
End: 2022-08-02

## 2022-08-02 DIAGNOSIS — M06.9 RHEUMATOID ARTHRITIS INVOLVING MULTIPLE SITES, UNSPECIFIED WHETHER RHEUMATOID FACTOR PRESENT: Primary | ICD-10-CM

## 2022-08-02 RX ORDER — LEFLUNOMIDE 20 MG/1
20 TABLET ORAL DAILY
Qty: 90 TABLET | Refills: 0 | Status: SHIPPED | OUTPATIENT
Start: 2022-08-02 | End: 2023-05-03 | Stop reason: SDUPTHER

## 2022-08-02 RX ORDER — PREDNISONE 5 MG/1
10 TABLET ORAL DAILY
Qty: 60 TABLET | Refills: 1 | Status: SHIPPED | OUTPATIENT
Start: 2022-08-02 | End: 2022-10-18 | Stop reason: SDUPTHER

## 2022-08-02 RX ORDER — PREDNISONE 5 MG/1
10 TABLET ORAL DAILY
Qty: 60 TABLET | Refills: 1 | OUTPATIENT
Start: 2022-08-02

## 2022-08-11 ENCOUNTER — PATIENT MESSAGE (OUTPATIENT)
Dept: PHARMACY | Facility: CLINIC | Age: 52
End: 2022-08-11

## 2022-08-17 ENCOUNTER — HOSPITAL ENCOUNTER (OUTPATIENT)
Dept: PULMONOLOGY | Facility: CLINIC | Age: 52
Discharge: HOME | End: 2022-08-17
Payer: MEDICAID

## 2022-08-17 ENCOUNTER — HOSPITAL ENCOUNTER (OUTPATIENT)
Dept: PULMONOLOGY | Facility: CLINIC | Age: 52
Discharge: HOME | End: 2022-08-17

## 2022-08-17 ENCOUNTER — HOSPITAL ENCOUNTER (OUTPATIENT)
Dept: RADIOLOGY | Facility: HOSPITAL | Age: 52
Discharge: HOME | End: 2022-08-17
Attending: INTERNAL MEDICINE
Payer: MEDICAID

## 2022-08-17 VITALS — BODY MASS INDEX: 40.85 KG/M2 | WEIGHT: 254.19 LBS | HEIGHT: 66 IN

## 2022-08-17 DIAGNOSIS — M60.9 MYOSITIS, UNSPECIFIED MYOSITIS TYPE, UNSPECIFIED SITE: ICD-10-CM

## 2022-08-17 DIAGNOSIS — M06.9 RHEUMATOID ARTHRITIS, INVOLVING UNSPECIFIED SITE, UNSPECIFIED WHETHER RHEUMATOID FACTOR PRESENT: ICD-10-CM

## 2022-08-17 PROBLEM — R06.02 SHORTNESS OF BREATH: Status: ACTIVE | Noted: 2022-08-17

## 2022-08-17 PROCEDURE — 94727 PR PULM FUNCTION TEST BY GAS: ICD-10-PCS | Mod: 26,S$PBB,, | Performed by: INTERNAL MEDICINE

## 2022-08-17 PROCEDURE — 94729 DIFFUSING CAPACITY: CPT | Mod: 26,S$PBB,, | Performed by: INTERNAL MEDICINE

## 2022-08-17 PROCEDURE — 94618 PULMONARY STRESS TESTING: ICD-10-PCS | Mod: 26,S$PBB,, | Performed by: INTERNAL MEDICINE

## 2022-08-17 PROCEDURE — 94727 GAS DIL/WSHOT DETER LNG VOL: CPT | Mod: PBBFAC | Performed by: INTERNAL MEDICINE

## 2022-08-17 PROCEDURE — 94618 PULMONARY STRESS TESTING: CPT | Mod: 26,S$PBB,, | Performed by: INTERNAL MEDICINE

## 2022-08-17 PROCEDURE — 94618 PULMONARY STRESS TESTING: CPT | Mod: PBBFAC | Performed by: INTERNAL MEDICINE

## 2022-08-17 PROCEDURE — 73718 MRI LOWER EXTREMITY W/O DYE: CPT | Mod: 26,RT,, | Performed by: RADIOLOGY

## 2022-08-17 PROCEDURE — 73718 MRI LOWER EXTREMITY W/O DYE: CPT | Mod: 26,LT,, | Performed by: RADIOLOGY

## 2022-08-17 PROCEDURE — 73718 MRI LOWER EXTREMITY W/O DYE: CPT | Mod: TC,LT

## 2022-08-17 PROCEDURE — 73718 MRI FEMUR WITHOUT CONTRAST RIGHT: ICD-10-PCS | Mod: 26,RT,, | Performed by: RADIOLOGY

## 2022-08-17 PROCEDURE — 73718 MRI FEMUR WITHOUT CONTRAST LEFT: ICD-10-PCS | Mod: 26,LT,, | Performed by: RADIOLOGY

## 2022-08-17 PROCEDURE — 94010 BREATHING CAPACITY TEST: CPT | Mod: 26,59,S$PBB, | Performed by: INTERNAL MEDICINE

## 2022-08-17 PROCEDURE — 94729 PR C02/MEMBANE DIFFUSE CAPACITY: ICD-10-PCS | Mod: 26,S$PBB,, | Performed by: INTERNAL MEDICINE

## 2022-08-17 PROCEDURE — 94729 DIFFUSING CAPACITY: CPT | Mod: PBBFAC | Performed by: INTERNAL MEDICINE

## 2022-08-17 PROCEDURE — 94727 GAS DIL/WSHOT DETER LNG VOL: CPT | Mod: 26,S$PBB,, | Performed by: INTERNAL MEDICINE

## 2022-08-17 PROCEDURE — 73718 MRI LOWER EXTREMITY W/O DYE: CPT | Mod: TC,RT

## 2022-08-17 PROCEDURE — 94010 BREATHING CAPACITY TEST: CPT | Mod: PBBFAC | Performed by: INTERNAL MEDICINE

## 2022-08-17 PROCEDURE — 94010 BREATHING CAPACITY TEST: ICD-10-PCS | Mod: 26,59,S$PBB, | Performed by: INTERNAL MEDICINE

## 2022-08-19 ENCOUNTER — SPECIALTY PHARMACY (OUTPATIENT)
Dept: PHARMACY | Facility: CLINIC | Age: 52
End: 2022-08-19

## 2022-08-19 ENCOUNTER — TELEPHONE (OUTPATIENT)
Dept: RHEUMATOLOGY | Facility: CLINIC | Age: 52
End: 2022-08-19

## 2022-08-19 DIAGNOSIS — M06.9 RHEUMATOID ARTHRITIS, INVOLVING UNSPECIFIED SITE, UNSPECIFIED WHETHER RHEUMATOID FACTOR PRESENT: Primary | ICD-10-CM

## 2022-08-19 DIAGNOSIS — R06.02 SOB (SHORTNESS OF BREATH): Primary | ICD-10-CM

## 2022-08-19 LAB
MISCELLANEOUS TEST NAME: NORMAL
REFERENCE LAB: NORMAL
SPECIMEN TYPE: NORMAL
TEST RESULT: NORMAL

## 2022-08-19 NOTE — TELEPHONE ENCOUNTER
Patients wife reports having enough doses on hand and does not need to schedule a refill until next week. Rescheduling call for 8/24

## 2022-08-19 NOTE — PROCEDURES
Joaquín Rod is a 51 y.o.  male patient, who presents for a 6 minute walk test ordered by MD Dashawn.  The diagnosis is Rheumatoid Arthritis; Dyspnea on Exertion.  The patient's BMI is 41 kg/m2.  Predicted distance (lower limit of normal) is 396.11 meters.      Test Results:    The test was completed without stopping.  The total time walked was 360 seconds.  During walking, the patient reported:  Dyspnea.  The patient used a cane for assistance during testing.     08/17/2022---------Distance: 243.84 meters (800 feet)     O2 Sat % Supplemental Oxygen Heart Rate Blood Pressure Hu Scale   Pre-exercise  (Resting) 96 % Room Air 94 bpm 122/70 mmHg 3   During Exercise 96 % Room Air 116 bpm 119/72 mmHg 5-6   Post-exercise  (Recovery) 96 % Room Air  104 bpm       Recovery Time: 72 seconds    Performing nurse/tech: Estop[employer] RRT      PREVIOUS STUDY:   The patient has not had a previous study.      CLINICAL INTERPRETATION:  Six minute walk distance is 243.84 meters (800 feet) with heavy dyspnea.  During exercise, there was no desaturation while breathing room air.  Blood pressure remained stable and Heart rate increased significantly with walking.  The patient did not report non-pulmonary symptoms during exercise.  Significant exercise impairment is likely due to subjective symptoms.  The patient did complete the study, walking 360 seconds of the 360 second test.  No previous study performed.  Based upon age and body mass index, exercise capacity is less than predicted.

## 2022-08-23 ENCOUNTER — PATIENT MESSAGE (OUTPATIENT)
Dept: RHEUMATOLOGY | Facility: CLINIC | Age: 52
End: 2022-08-23

## 2022-08-24 ENCOUNTER — PATIENT MESSAGE (OUTPATIENT)
Dept: PHARMACY | Facility: CLINIC | Age: 52
End: 2022-08-24

## 2022-08-29 NOTE — TELEPHONE ENCOUNTER
Specialty Pharmacy - Refill Coordination    Specialty Medication Orders Linked to Encounter      Flowsheet Row Most Recent Value   Medication #1 methotrexate, PF, (RASUVO, PF,) 25 mg/0.5 mL AtIn (Order#[id number], Rx#[id number])   Medication #2 abatacept (ORENCIA CLICKJECT) 125 mg/mL AtIn (Order#[id number], Rx#[id number])            Refill Questions - Documented Responses      Flowsheet Row Most Recent Value   Patient Availability and HIPAA Verification    Does patient want to proceed with activity? Yes   HIPAA/medical authority confirmed? Yes   Relationship to patient of person spoken to? Spouse/Significant Other   Refill Screening Questions    Changes to allergies? No   Changes to medications? No   New conditions since last clinic visit? No   Unplanned office visit, urgent care, ED, or hospital admission in the last 4 weeks? No   How does patient/caregiver feel medication is working? Good   Financial problems or insurance changes? No   How many doses of your specialty medications were missed in the last 4 weeks? 1  [Pt was sick and was told from provider to hold.]   Would patient like to speak to a pharmacist? No   When does the patient need to receive the medication? 09/10/22   Refill Delivery Questions    How will the patient receive the medication? Mail   When does the patient need to receive the medication? 09/10/22   Shipping Address Home   Address in [facility] confirmed and updated if neccessary? Yes   Expected Copay ($) 0   Is the patient able to afford the medication copay? Yes   Payment Method zero copay   Days supply of Refill 28   Supplies needed? No supplies needed   Refill activity completed? Yes   Refill activity plan Refill scheduled   Shipment/Pickup Date: 09/06/22            Current Outpatient Medications   Medication Sig    abatacept (ORENCIA CLICKJECT) 125 mg/mL AtIn Inject 125 mg (1 pen) into the skin every 7 days.    amLODIPine (NORVASC) 5 MG tablet amlodipine 5 mg tablet   TAKE 1  TABLET BY MOUTH EVERY DAY    atorvastatin (LIPITOR) 20 MG tablet atorvastatin 20 mg tablet   TAKE 1 TABLET BY MOUTH EVERY DAY    budesonide (PULMICORT) 0.5 mg/2 mL nebulizer solution 2 mLs.    budesonide 1 mg/2 mL NbS budesonide 1 mg/2 mL suspension for nebulization   INHALE 2 ML TWICE A DAY BY NEBULIZATION ROUTE AS NEEDED.    busPIRone (BUSPAR) 10 MG tablet Take 10 mg by mouth 2 (two) times daily.    fluticasone propionate (FLONASE) 50 mcg/actuation nasal spray fluticasone propionate 50 mcg/actuation nasal spray,suspension   SPRAY 2 SPRAYS INTO EACH NOSTRIL EVERY DAY    folic acid (FOLVITE) 1 MG tablet Take 1 tablet (1 mg total) by mouth once daily.    leflunomide (ARAVA) 20 MG Tab Take 1 tablet (20 mg total) by mouth once daily.    methotrexate, PF, (RASUVO, PF,) 25 mg/0.5 mL AtIn Inject 0.5 mLs (25 mg total) into the skin every 7 days.    predniSONE (DELTASONE) 5 MG tablet Take 2 tablets (10 mg total) by mouth once daily.    telmisartan-hydrochlorothiazide (MICARDIS HCT) 80-25 mg per tablet telmisartan 80 mg-hydrochlorothiazide 25 mg tablet   TAKE 1 TABLET BY MOUTH EVERY DAY    venlafaxine (EFFEXOR) 37.5 MG Tab Take 37.5 mg by mouth.   Last reviewed on 7/20/2022 11:36 AM by Keesha Aviles MA    Review of patient's allergies indicates:  No Known Allergies Last reviewed on  7/20/2022 11:34 AM by Keesha Aviles      Tasks added this encounter   9/29/2022 - Refill Call (Auto Added)  9/29/2022 - Refill Call (Auto Added)   Tasks due within next 3 months   No tasks due.     Geneva Hopson, Patient Care Assistant  [facility]  [address] [place] [address]  Phone: [phone]  Fax: [phone]

## 2022-09-28 ENCOUNTER — OFFICE VISIT (OUTPATIENT)
Dept: RHEUMATOLOGY | Facility: CLINIC | Age: 52
End: 2022-09-28
Payer: MEDICAID

## 2022-09-28 ENCOUNTER — HOSPITAL ENCOUNTER (OUTPATIENT)
Dept: CARDIOLOGY | Facility: HOSPITAL | Age: 52
Discharge: HOME | End: 2022-09-28
Attending: INTERNAL MEDICINE
Payer: MEDICAID

## 2022-09-28 VITALS
HEART RATE: 97 BPM | SYSTOLIC BLOOD PRESSURE: 135 MMHG | DIASTOLIC BLOOD PRESSURE: 80 MMHG | WEIGHT: 254 LBS | HEIGHT: 66 IN | BODY MASS INDEX: 40.82 KG/M2

## 2022-09-28 VITALS
DIASTOLIC BLOOD PRESSURE: 86 MMHG | BODY MASS INDEX: 40.48 KG/M2 | HEART RATE: 97 BPM | WEIGHT: 257.94 LBS | SYSTOLIC BLOOD PRESSURE: 122 MMHG | HEIGHT: 67 IN

## 2022-09-28 DIAGNOSIS — M06.9 RHEUMATOID ARTHRITIS, INVOLVING UNSPECIFIED SITE, UNSPECIFIED WHETHER RHEUMATOID FACTOR PRESENT: Primary | ICD-10-CM

## 2022-09-28 DIAGNOSIS — J84.9 INTERSTITIAL PULMONARY DISEASE, UNSPECIFIED: ICD-10-CM

## 2022-09-28 DIAGNOSIS — R06.02 SOB (SHORTNESS OF BREATH): ICD-10-CM

## 2022-09-28 LAB
ASCENDING AORTA: 3.01 CM
AV INDEX (PROSTH): 1.04
AV MEAN GRADIENT: 2 MMHG
AV PEAK GRADIENT: 4 MMHG
AV VALVE AREA: 4.13 CM2
AV VELOCITY RATIO: 0.97
BSA FOR ECHO PROCEDURE: 2.32 M2
CV ECHO LV RWT: 0.39 CM
DOP CALC AO PEAK VEL: 0.97 M/S
DOP CALC AO VTI: 16.24 CM
DOP CALC LVOT AREA: 4 CM2
DOP CALC LVOT DIAMETER: 2.25 CM
DOP CALC LVOT PEAK VEL: 0.94 M/S
DOP CALC LVOT STROKE VOLUME: 67.08 CM3
DOP CALCLVOT PEAK VEL VTI: 16.88 CM
E WAVE DECELERATION TIME: 176.42 MSEC
E/A RATIO: 0.95
E/E' RATIO: 9.5 M/S
ECHO LV POSTERIOR WALL: 0.8 CM (ref 0.6–1.1)
EJECTION FRACTION: 65 %
FRACTIONAL SHORTENING: 27 % (ref 28–44)
INTERVENTRICULAR SEPTUM: 0.86 CM (ref 0.6–1.1)
LA MAJOR: 4.5 CM
LA MINOR: 3.63 CM
LA WIDTH: 4.13 CM
LEFT ATRIUM SIZE: 3.5 CM
LEFT ATRIUM VOLUME INDEX MOD: 23.6 ML/M2
LEFT ATRIUM VOLUME INDEX: 22.3 ML/M2
LEFT ATRIUM VOLUME MOD: 52.12 CM3
LEFT ATRIUM VOLUME: 49.37 CM3
LEFT INTERNAL DIMENSION IN SYSTOLE: 3.02 CM (ref 2.1–4)
LEFT VENTRICLE DIASTOLIC VOLUME INDEX: 34.56 ML/M2
LEFT VENTRICLE DIASTOLIC VOLUME: 76.37 ML
LEFT VENTRICLE MASS INDEX: 47 G/M2
LEFT VENTRICLE SYSTOLIC VOLUME INDEX: 16.1 ML/M2
LEFT VENTRICLE SYSTOLIC VOLUME: 35.67 ML
LEFT VENTRICULAR INTERNAL DIMENSION IN DIASTOLE: 4.15 CM (ref 3.5–6)
LEFT VENTRICULAR MASS: 104.31 G
LV LATERAL E/E' RATIO: 9.5 M/S
LV SEPTAL E/E' RATIO: 9.5 M/S
MV A" WAVE DURATION": 8.56 MSEC
MV PEAK A VEL: 0.6 M/S
MV PEAK E VEL: 0.57 M/S
MV STENOSIS PRESSURE HALF TIME: 51.16 MS
MV VALVE AREA P 1/2 METHOD: 4.3 CM2
PULM VEIN S/D RATIO: 1.6
PV PEAK D VEL: 0.3 M/S
PV PEAK S VEL: 0.48 M/S
RA MAJOR: 3.73 CM
RA WIDTH: 2.92 CM
RIGHT VENTRICULAR END-DIASTOLIC DIMENSION: 4.22 CM
RV TISSUE DOPPLER FREE WALL SYSTOLIC VELOCITY 1 (APICAL 4 CHAMBER VIEW): 15.73 CM/S
SINUS: 2.94 CM
STJ: 2.81 CM
TDI LATERAL: 0.06 M/S
TDI SEPTAL: 0.06 M/S
TDI: 0.06 M/S
TRICUSPID ANNULAR PLANE SYSTOLIC EXCURSION: 1.96 CM

## 2022-09-28 PROCEDURE — 93306 TTE W/DOPPLER COMPLETE: CPT | Mod: 26,,, | Performed by: INTERNAL MEDICINE

## 2022-09-28 PROCEDURE — 99214 PR OFFICE/OUTPT VISIT, EST, LEVL IV, 30-39 MIN: ICD-10-PCS | Mod: S$PBB,,, | Performed by: INTERNAL MEDICINE

## 2022-09-28 PROCEDURE — 93306 TTE W/DOPPLER COMPLETE: CPT

## 2022-09-28 PROCEDURE — 93306 ECHO (CUPID ONLY): ICD-10-PCS | Mod: 26,,, | Performed by: INTERNAL MEDICINE

## 2022-09-28 PROCEDURE — 99999 PR PBB SHADOW E&M-EST. PATIENT-LVL IV: CPT | Mod: PBBFAC,,, | Performed by: INTERNAL MEDICINE

## 2022-09-28 PROCEDURE — 99999 PR PBB SHADOW E&M-EST. PATIENT-LVL IV: ICD-10-PCS | Mod: PBBFAC,,, | Performed by: INTERNAL MEDICINE

## 2022-09-28 PROCEDURE — 99214 OFFICE O/P EST MOD 30 MIN: CPT | Mod: PBBFAC,25 | Performed by: INTERNAL MEDICINE

## 2022-09-28 PROCEDURE — 99214 OFFICE O/P EST MOD 30 MIN: CPT | Mod: S$PBB,,, | Performed by: INTERNAL MEDICINE

## 2022-09-28 ASSESSMENT — CLINICAL DISEASE ACTIVITY INDEX (CDAI)
SWOLLEN_JOINTS_COUNT: 6
TENDER_JOINTS_COUNT: 25
TENDER_JOINTS_COUNT: 25
TOTAL_SCORE: 45
PHYSICIAN_ASSESSMENT: 7
TOTAL_SCORE: 45
PATIENT_ASSESSMENT: 7
PATIENT_ASSESSMENT: 7
SWOLLEN_JOINTS_COUNT: 6
PHYSICIAN_ASSESSMENT: 7

## 2022-09-28 ASSESSMENT — ROUTINE ASSESSMENT OF PATIENT INDEX DATA (RAPID3)
PAIN SCORE: 7
MDHAQ FUNCTION SCORE: 2
AM STIFFNESS SCORE: 1, YES
PSYCHOLOGICAL DISTRESS SCORE: 7.7
FATIGUE SCORE: 7
WHEN YOU AWAKENED IN THE MORNING OVER THE LAST WEEK, PLEASE INDICATE THE AMOUNT OF TIME IT TAKES UNTIL YOU ARE AS LIMBER AS YOU WILL BE FOR THE DAY: 1 HOUR
PATIENT GLOBAL ASSESSMENT SCORE: 7
TOTAL RAPID3 SCORE: 6.89

## 2022-09-28 NOTE — PROGRESS NOTES
Subjective:       Patient ID: Joaquín Rod is a 51 y.o. male.    Chief Complaint: Disease Management    51 year old man with PMH Seronegative RA with positive Anti CarP, HTN, HLD, SOB presents for follow up. Patient managed at this time on Orencia (started 3/22), Arava (increased to 20mg daily in June), MTX 25mg injection, Prednisone 10 mg daily. Patient reports that most of these meds have not lead to significant improvement in his symptoms, with Orencia being the only one that lead to any real improvement (30-40%). He states that since his appointment in June he has noticed no real improvement at all in his pain levels, functionality, joint involvement, swelling, etc. He also reports continued SOB and proximal weakness. Patient needs help cleaning himself in the shower and getting into and out of the shower. He struggles to walk a flight of stairs due to pain and weakness. He has fallen 3-4 times since last visit, with PT/OT/Home health involved through his PCP. He reports that PT and OT have made a significant improvement in his ability to function and that he continues to do these exercises.   Review of Systems   Constitutional:  Positive for activity change and fatigue. Negative for appetite change, chills, diaphoresis, fever and unexpected weight change.   HENT:  Negative for congestion, dental problem, drooling, ear discharge and ear pain.    Respiratory:  Positive for shortness of breath. Negative for apnea, cough, choking, chest tightness, wheezing and stridor.    Cardiovascular:  Positive for chest pain (at night). Negative for palpitations and leg swelling.   Gastrointestinal:  Negative for abdominal distention, abdominal pain, constipation, diarrhea and nausea.   Musculoskeletal:  Positive for arthralgias, back pain, gait problem, joint swelling, myalgias, neck pain and neck stiffness.   Skin:  Negative for color change, pallor, rash and wound.   Neurological:  Negative for dizziness, facial  "asymmetry, light-headedness and headaches.   Hematological:  Negative for adenopathy. Does not bruise/bleed easily.   All other systems reviewed and are negative.      Objective:   /86   Pulse 97   Ht 5' 7" (1.702 m)   Wt 117 kg (257 lb 15 oz)   BMI 40.40 kg/m²      Physical Exam   Constitutional: He is oriented to person, place, and time. He appears obese.  Non-toxic appearance. He does not appear ill. No distress.   HENT:   Head: Normocephalic and atraumatic.   Nose: Nose normal. No rhinorrhea or nasal congestion.   Mouth/Throat: Mucous membranes are moist. No oropharyngeal exudate or posterior oropharyngeal erythema. Oropharynx is clear.   Eyes: Pupils are equal, round, and reactive to light. Conjunctivae are normal. Right eye exhibits no discharge. Left eye exhibits no discharge. No scleral icterus.   Cardiovascular: Normal rate, regular rhythm and normal pulses. Exam reveals no gallop and no friction rub.   No murmur heard.  Pulmonary/Chest: Effort normal and breath sounds normal. No stridor. No respiratory distress. He has no wheezes. He has no rhonchi. He has no rales. He exhibits no tenderness.   Abdominal: Soft. Normal appearance. He exhibits no distension and no mass. There is no abdominal tenderness. No hernia.   Musculoskeletal:         General: Swelling, tenderness and deformity present.      Cervical back: Normal range of motion. No rigidity.      Right lower leg: No edema.      Left lower leg: No edema.   Lymphadenopathy:     He has no cervical adenopathy.   Neurological: He is alert and oriented to person, place, and time.   Skin: Skin is warm and dry. No lesion and no rash noted. He is not diaphoretic.   Vitals reviewed.      3/16/2022 6/8/2022 7/20/2022 9/28/2022   Tender (JOSEPH-28) 21 / 28 24 / 28 13 / 28 25 / 28    Swollen (JOSEPH-28) 10 / 28  21 / 28  8 / 28  6 / 28    Provider Global 40 mm -- 25 mm --   Patient Global 50 mm -- 50 mm --   ESR 23 mm/hr -- 24 mm/hr --   CRP 2.6 mg/L -- 2.6 " mg/L --   JOSEPH-28 (ESR) 6.35 (High disease activity) -- 5.74 (High disease activity) --   JOSEPH-28 (CRP) 5.57 (High disease activity) -- 4.93 (Moderate disease activity) --   CDAI Score 40  -- 28.5  --        Assessment:       1. Rheumatoid arthritis, involving unspecified site, unspecified whether rheumatoid factor present    2. Interstitial pulmonary disease, unspecified          Seronegative RF: Patient with positive CarP Ab. Etanercept failed secondary to lack of response. On Arava, MTX, Prednisone 10 daily, Abatacept. Patient with reported moderate improvement on Abatecept, though still with significant and debilitating pain and active inflammation on exam. CDAI 45 History of HTN, HLD, obesity, current chest pain make patient a poor candidate for SHIMA inhibitor. Will discontinue Orencia and initiate Actemra 162mg injection weekly.   -Check CBC/CMP monthly for first three months on actemra and today. Check CK/Lipid panel in 3 mo.   -Discontinue Orencia, initiate Actemra as noted. Patient provided with Actemra ACR info sheet.   -Will update preDMARD labs  -Patient will need vaccinations including covid and flu. Asked patient to bring in vaccine records from PCP and to hold MTX for 1 wk after vaccinations.     SOB: PFTs updated showing FVC 84%, TLC 75%, RV 41, DLCO 89%. These are largely stable since last check. TTE without abnormalities that would explain sob including no CHF or PHTN suggested. Will order HRCT at this time for further evaluation.   -Follow up on HRCT results    Chest Pain: At night when lying down per patient. With cardiovascular risk factors. Will refer to cardiology for evaluation.   -Refer to cardiology    LE weakness: Proximal LE predominantly. Making it difficult for patient to climb stairs. PL12 positive in the past. Aldolase, CK, PL12, and MRIs negative for myositis at this time. Will continue to monitor CK/LDH/Aldolase q 3 mo. Suspect significant contribution for steroids. Encouraged patient  to continue to PT exercises.   -Follow up CK/LDH/Aldolase labs  -Continue to PT/OT exercises.       Plan:       Problem List Items Addressed This Visit          Immunology/Multi System    Rheumatoid disease - Primary    Relevant Orders    LIPID PANEL    CK    CBC W/ AUTO DIFFERENTIAL    COMPREHENSIVE METABOLIC PANEL    CK    Aldolase    Lactate Dehydrogenase    Ambulatory referral/consult to Cardiology    HIV 1/2 Ag/Ab (4th Gen)    Hepatitis B surface antigen    HBcAB    Hepatitis B surface antibody    Hepatitis C antibody    Hepatitis A antibody, IgG    Strongyloides IgG Antibodies    Quantiferon Gold TB    RPR    Varicella zoster antibody, IgG    CBC W/ AUTO DIFFERENTIAL    COMPREHENSIVE METABOLIC PANEL    Sedimentation rate    C-REACTIVE PROTEIN     Other Visit Diagnoses       Interstitial pulmonary disease, unspecified        Relevant Orders    CT Chest Without Contrast          Roberto Carlos Tamez

## 2022-09-29 ENCOUNTER — PATIENT MESSAGE (OUTPATIENT)
Dept: PHARMACY | Facility: CLINIC | Age: 52
End: 2022-09-29

## 2022-09-29 ENCOUNTER — SPECIALTY PHARMACY (OUTPATIENT)
Dept: PHARMACY | Facility: CLINIC | Age: 52
End: 2022-09-29

## 2022-09-29 DIAGNOSIS — M06.9 RHEUMATOID ARTHRITIS, INVOLVING UNSPECIFIED SITE, UNSPECIFIED WHETHER RHEUMATOID FACTOR PRESENT: Primary | ICD-10-CM

## 2022-09-29 RX ORDER — ABATACEPT 125 MG/ML
125 INJECTION, SOLUTION SUBCUTANEOUS
Qty: 4 ML | Refills: 2 | Status: CANCELLED | OUTPATIENT
Start: 2022-09-29

## 2022-09-29 NOTE — TELEPHONE ENCOUNTER
"Ghada, this is Leatha Brooks with [facility].  We are working on your prescription that your doctor has sent us. We will be working with your insurance to get this approved for you. We will be calling you along the way with updates on your medication.  If you have any questions, you can reach us at [phone].    Welcome call outcome: Patient/caregiver reached    Pt switching from Orencia to Actemra..    Attempting to submit PA via CM website.  CMM responsed that "plan is experiencing network issues, causing delays in question sets being return."  Will continue to follow up.  "

## 2022-09-29 NOTE — PROGRESS NOTES
I have personally reviewed the history, confirmed exam findings, and discussed assessment and plan with fellow.       PFTs        FVC    TLC      RV        DLco    8/17/22     83.9    74.9     42.1      89.2  4/14/21     86.5    75        52.1      95        EXAMINATION:  CT CHEST WITHOUT CONTRAST     CLINICAL HISTORY:  r/o ILD in setting of myositis; Myositis, unspecified     TECHNIQUE:  Using low dose technique the chest was surveyed from above the pulmonary apices through the posterior costophrenic angles.  Data was reconstructed for multiplanar images in axial, sagittal and coronal planes and for maximal intensity projection images in the the axial, sagittal and coronal planes.     All CT scans at this facility use dose modulation, iterative reconstruction and/or weight based dosing when appropriate to reduce radiation dose to as low as reasonably achievable.     Xray dose: DLP = 667.49 mGy-cm.     COMPARISON:  Chest radiograph 03/12/2021     FINDINGS:  Base of Neck: No significant abnormality.     Aorta: Left-sided aortic arch with 3 arterial branches. The aorta maintains normal caliber, contour and course. There is no calcification of the thoracic aorta.  There is  no coronary artery calcification.     Heart/pericardium: Normal size.  No pericardial effusion or calcification.     Pulmonary vasculature: Pulmonary arteries distribute normally.  There are four pulmonary veins.     Mercedez/Mediastinum: No pathologic amanda enlargement.  Hilar contours are unremarkable in this noncontrast study.     Pleura: No pleural fluid or thickening.No pleural calcification.     Esophagus: Normal.     Upper Abdomen: 6.0 cm sharply circumscribed hypodensity noted in the dome of the right lobe of the liver with several smaller hypodensities in the left lobe.  Statistically these are favored to represent hepatic cysts but the smaller lesions are incompletely characterized on today's examination.  No prior study for comparison.      Thoracic soft tissues: Normal. Both breasts are present.     Bones: No acute fracture. No suspicious lytic or sclerotic lesion.     Airways: Patent.     Lungs: Symmetrically expanded.  Bandlike opacity located in the right lower lobe separates the superior segment from the cephalad aspect of the anterior basal segment compatible with an incomplete subsegmental fissure (axial series 4, images 173-177; coronal series 604, image 144).     -There is modest increased attenuation in the dependent portions of the lower lobes compatible with gravity gradient.     -Taking these findings into account, lungs are clear.  We detect no convincing evidence of UIP, NSIP, sarcoidosis, subacute or chronic hypersensitivity pneumonia or other interstitial lung disease in this patient with a history of myositis..     Impression:     No evidence of interstitial lung disease.     Electronically signed by resident: Jada Perdue    Prednisone 10mg daily 2/24/21-and continues  Methotrexate 3/12 /21 and continues  Leflunomide 10mg 6/8/22, now 20mg and continues  Etanercept 11/8/21- 3/16/22  Abatacept  3/16/22- 9/29/22          NADYA+RF-ACPA- CarP + polyarthritis PL12 weak+ repeat -:  TJ 25 SJ 6  Pt global 70  JOSEPH 28 6.66  YCF35-SMM 5.68  CDAI 41.5  despite Rasuvo(methotrexate) 25mg sc q 7 days with folic acid 1mg daily, leflunomide 20mg daily, prednisone 10mg daily and abatacept 125mg sc aq 7 days.  Rheumatoid Arthritis Treatment Goals:  -Disease Activity Measure: CDAI  -Target: Low or Remission  -Patient Goal:  -Therapy/Change: change abatacept to tocilizumab  -Shared Decision Making: Discussed goals of care and therapy plan together with patient as outlined above.      Right biceps tendon rupture  Mild and stable restrictive lung disease with ESCOBAR  Exertional chest discomfort  Morbid obesity  /86    Ref to Cardiology for exertional chest pain  Repeat HRCT chest to evaluate ESCOBAR  Stop abatacept and change to tocilizumab, risks and  benefits discussed, hand out provided. Rx sent to [facility]  Continue others  RTC 3 months with standing labs

## 2022-09-29 NOTE — TELEPHONE ENCOUNTER
Specialty Pharmacy - Refill Coordination    Specialty Medication Orders Linked to Encounter      Flowsheet Row Most Recent Value   Medication #1 methotrexate, PF, (RASUVO, PF,) 25 mg/0.5 mL AtIn (Order#[id number], Rx#[id number])            Refill Questions - Documented Responses      Flowsheet Row Most Recent Value   Patient Availability and HIPAA Verification    Does patient want to proceed with activity? Yes   HIPAA/medical authority confirmed? Yes   Relationship to patient of person spoken to? Self   Refill Screening Questions    Changes to allergies? No   Changes to medications? No   New conditions since last clinic visit? No   Unplanned office visit, urgent care, ED, or hospital admission in the last 4 weeks? No   How does patient/caregiver feel medication is working? Good   Financial problems or insurance changes? No   How many doses of your specialty medications were missed in the last 4 weeks? 0   Would patient like to speak to a pharmacist? No   When does the patient need to receive the medication? 10/08/22   Refill Delivery Questions    How will the patient receive the medication? Mail   When does the patient need to receive the medication? 10/08/22   Shipping Address Home   Address in [facility] confirmed and updated if neccessary? Yes   Expected Copay ($) 0   Is the patient able to afford the medication copay? Yes   Payment Method zero copay   Days supply of Refill 28   Supplies needed? No supplies needed   Refill activity completed? Yes   Refill activity plan Refill scheduled   Shipment/Pickup Date: 10/03/22            Current Outpatient Medications   Medication Sig    amLODIPine (NORVASC) 5 MG tablet amlodipine 5 mg tablet   TAKE 1 TABLET BY MOUTH EVERY DAY    atorvastatin (LIPITOR) 20 MG tablet atorvastatin 20 mg tablet   TAKE 1 TABLET BY MOUTH EVERY DAY    budesonide (PULMICORT) 0.5 mg/2 mL nebulizer solution 2 mLs.    budesonide 1 mg/2 mL NbSp budesonide 1 mg/2 mL suspension for  nebulization   INHALE 2 ML TWICE A DAY BY NEBULIZATION ROUTE AS NEEDED.    busPIRone (BUSPAR) 10 MG tablet Take 10 mg by mouth 2 (two) times daily.    fluticasone propionate (FLONASE) 50 mcg/actuation nasal spray fluticasone propionate 50 mcg/actuation nasal spray,suspension   SPRAY 2 SPRAYS INTO EACH NOSTRIL EVERY DAY    folic acid (FOLVITE) 1 MG tablet Take 1 tablet (1 mg total) by mouth once daily.    leflunomide (ARAVA) 20 MG Tab Take 1 tablet (20 mg total) by mouth once daily.    methotrexate, PF, (RASUVO, PF,) 25 mg/0.5 mL AtIn Inject 0.5 mLs (25 mg total) into the skin every 7 days.    predniSONE (DELTASONE) 5 MG tablet Take 2 tablets (10 mg total) by mouth once daily.    telmisartan-hydrochlorothiazide (MICARDIS HCT) 80-25 mg per tablet telmisartan 80 mg-hydrochlorothiazide 25 mg tablet   TAKE 1 TABLET BY MOUTH EVERY DAY    tocilizumab 162 mg/0.9 mL PnIj Inject 162 mg into the skin every 7 days.    venlafaxine (EFFEXOR) 37.5 MG Tab Take 37.5 mg by mouth.   Last reviewed on 9/28/2022  1:10 PM by Mally Laws MA    Review of patient's allergies indicates:  No Known Allergies Last reviewed on  9/29/2022 2:43 PM by Brittany Carter      Tasks added this encounter   No tasks added.   Tasks due within next 3 months   10/29/2022 - Refill Call (Auto Added)  10/29/2022 - Refill Call (Auto Added)     Susan Benitez, PharmD  [facility]  [address] [place] [id number]  Phone: [phone]  Fax: [phone]

## 2022-09-30 NOTE — TELEPHONE ENCOUNTER
Clinical questions were unable to load.  Outgoing call to insurance to have PA request faxed to [facility].  Rep stated she was unable to locate pt.  Reviewed pt's documents that were scanned into media for insurance card and 's license.  Pt's name is incorrect in epic.  Pt's first name is Earnest.  Informed rep will reprocess PA under correct name.    Resubmitted [facility] PA via [facility] website

## 2022-10-02 ENCOUNTER — TELEPHONE (OUTPATIENT)
Dept: RHEUMATOLOGY | Facility: CLINIC | Age: 52
End: 2022-10-02
Payer: MEDICAID

## 2022-10-02 NOTE — TELEPHONE ENCOUNTER
Frandy, please check with ID to see if he needs stool O& P, retreatment with ivermectin, and if the antibody stays positive despite treatment, and how can we tell if re-infected Thank you MARKOS

## 2022-10-03 NOTE — TELEPHONE ENCOUNTER
Received fax from insurance.  Pt must try and fail Humira and Enbrel.  Faxed back re-consideration for Actemra.  Will continue to follow up.

## 2022-10-04 NOTE — TELEPHONE ENCOUNTER
Incoming call from pt's wife (Bree) checking on status of Actemra. Informed Ms. Giron PharmD faxed re-consideration for Actemra yesterday, have not received a determination yet. Pt requested a callback for update as soon as [facility] receive a determination. Pt's last dose of Orencia is 10/4, next injection due 10/11.    Ms. Giron: [phone]  Routing assigned PharmDs to follow up

## 2022-10-05 NOTE — TELEPHONE ENCOUNTER
Incoming call from pt's wife (Bree) returning [facility] call. Informed Ms. Giron PA is still under review as insurance required additional information. [facility] has faxed the required information in earlier today and as soon as PharmD receives the decision, [facility] will make a call to pt regarding determination. Ms. Giron verbalized understanding.

## 2022-10-05 NOTE — TELEPHONE ENCOUNTER
Received fax from insurance requiring CDAI score for Actemra PA request.  Faxed information to [employer] at [phone].  Will continue to follow up.    Outgoing call to pt to notify him insurance required additional information and PA was still pending.  No answer, unable to leave message, VM was full.

## 2022-10-05 NOTE — TELEPHONE ENCOUNTER
Actemra PA approved until 4/5/23. (Tracking ID #[id number]).      BI Complete     Rx [facility]  Deductible $500  (satisfied)  Max OOP $880 (satisfied)  Copay $4196.50  [facility] in network    Outgoing call to insurance to verify copay amount since copays have been $0 with previous prescriptions.  Rep Nicki stated pt's account has been suspended due to not paying premium.    Outgoing call to pt's wife to inform her copay is high due to not paying premium. Informed wife premium must be paid before correct copay is returned by insurance.  Informed wife to contact insurance to pay premium so pt is not penalized with copay.  Wife verbalized understanding.  Also obtained permission to enroll into Actemra copay card.  Copay card entered into [facility].     FOR DOCUMENTATION ONLY:  Actemra copay card (Max Benefit of $15,000)  RxBin [id number]   PCN [id number]  RxGrp [id number]  ID [id number]

## 2022-10-06 NOTE — TELEPHONE ENCOUNTER
Outgoing call to pt's wife to verify if pt has paid premium on insurance.  Wife stated she has paid the premium but insurance told her it can take up to 24 hours to update.  Wife stated she will follow up with [facility] tomorrow.  Pt is due for injection on 10/11.

## 2022-10-07 ENCOUNTER — OFFICE VISIT (OUTPATIENT)
Dept: CARDIOLOGY | Facility: CLINIC | Age: 52
End: 2022-10-07
Payer: MEDICAID

## 2022-10-07 ENCOUNTER — HOSPITAL ENCOUNTER (OUTPATIENT)
Dept: RADIOLOGY | Facility: HOSPITAL | Age: 52
Discharge: HOME | End: 2022-10-07
Attending: INTERNAL MEDICINE
Payer: MEDICAID

## 2022-10-07 VITALS
SYSTOLIC BLOOD PRESSURE: 118 MMHG | DIASTOLIC BLOOD PRESSURE: 65 MMHG | OXYGEN SATURATION: 97 % | HEIGHT: 67 IN | WEIGHT: 255.31 LBS | HEART RATE: 120 BPM | BODY MASS INDEX: 40.07 KG/M2

## 2022-10-07 DIAGNOSIS — E78.2 MIXED HYPERLIPIDEMIA: ICD-10-CM

## 2022-10-07 DIAGNOSIS — R06.00 DYSPNEA, UNSPECIFIED TYPE: Primary | ICD-10-CM

## 2022-10-07 DIAGNOSIS — J84.9 INTERSTITIAL PULMONARY DISEASE, UNSPECIFIED: ICD-10-CM

## 2022-10-07 DIAGNOSIS — R06.02 SHORTNESS OF BREATH: ICD-10-CM

## 2022-10-07 DIAGNOSIS — M06.9 RHEUMATOID ARTHRITIS, INVOLVING UNSPECIFIED SITE, UNSPECIFIED WHETHER RHEUMATOID FACTOR PRESENT: ICD-10-CM

## 2022-10-07 DIAGNOSIS — I10 PRIMARY HYPERTENSION: ICD-10-CM

## 2022-10-07 PROCEDURE — 99204 OFFICE O/P NEW MOD 45 MIN: CPT | Mod: S$PBB,,, | Performed by: STUDENT IN AN ORGANIZED HEALTH CARE EDUCATION/TRAINING PROGRAM

## 2022-10-07 PROCEDURE — 99215 OFFICE O/P EST HI 40 MIN: CPT | Mod: PBBFAC,25 | Performed by: STUDENT IN AN ORGANIZED HEALTH CARE EDUCATION/TRAINING PROGRAM

## 2022-10-07 PROCEDURE — 99999 PR PBB SHADOW E&M-EST. PATIENT-LVL V: ICD-10-PCS | Mod: PBBFAC,,, | Performed by: STUDENT IN AN ORGANIZED HEALTH CARE EDUCATION/TRAINING PROGRAM

## 2022-10-07 PROCEDURE — 71250 CT THORAX DX C-: CPT | Mod: 26,,, | Performed by: RADIOLOGY

## 2022-10-07 PROCEDURE — 93010 ELECTROCARDIOGRAM REPORT: CPT | Mod: S$PBB,,, | Performed by: INTERNAL MEDICINE

## 2022-10-07 PROCEDURE — 93005 ELECTROCARDIOGRAM TRACING: CPT | Mod: PBBFAC | Performed by: INTERNAL MEDICINE

## 2022-10-07 PROCEDURE — 99999 PR PBB SHADOW E&M-EST. PATIENT-LVL V: CPT | Mod: PBBFAC,,, | Performed by: STUDENT IN AN ORGANIZED HEALTH CARE EDUCATION/TRAINING PROGRAM

## 2022-10-07 PROCEDURE — 93010 EKG 12-LEAD: ICD-10-PCS | Mod: S$PBB,,, | Performed by: INTERNAL MEDICINE

## 2022-10-07 PROCEDURE — 71250 CT THORAX DX C-: CPT | Mod: TC

## 2022-10-07 PROCEDURE — 71250 CT CHEST WITHOUT CONTRAST: ICD-10-PCS | Mod: 26,,, | Performed by: RADIOLOGY

## 2022-10-07 PROCEDURE — 99204 PR OFFICE/OUTPT VISIT, NEW, LEVL IV, 45-59 MIN: ICD-10-PCS | Mod: S$PBB,,, | Performed by: STUDENT IN AN ORGANIZED HEALTH CARE EDUCATION/TRAINING PROGRAM

## 2022-10-07 NOTE — PROGRESS NOTES
Cardiology Clinic Note  Reason for Visit: SOB    HPI:   Pt is a 50yo M with pmhx of RA (on leflunomide, tocilizumab, and methotrexate), HTN, HLD who [place] [facility] [place]    Started in 2/2022.  Says he can walk only about  ft before getting winded and needing to rest.  Then can continue.  Denies any chest pain, LE swelling, orthopnea, or irregular heart beats.  Does monitor his BP at home and has seen his HR be elevated sometimes in the 140s at rest.  Denies any palpitations at that times as well.  Come to cardiology clinic for eval    ROS:    Constitution: Negative for fever, chills, weight loss or gain.   HENT: Negative for sore throat, rhinorrhea, or headache.  Eyes: Negative for blurred or double vision.   Cardiovascular: See above  Pulmonary: Negative for SOB   Gastrointestinal: Negative for abdominal pain, nausea, vomiting, or diarrhea.   : Negative for dysuria.   Neurological: Negative for focal weakness or sensory changes.  PMH:   History reviewed. No pertinent past medical history.  History reviewed. No pertinent surgical history.  Allergies:   Review of patient's allergies indicates:  No Known Allergies  Medications:     Current Outpatient Medications on File Prior to Visit   Medication Sig Dispense Refill    amLODIPine (NORVASC) 5 MG tablet amlodipine 5 mg tablet   TAKE 1 TABLET BY MOUTH EVERY DAY      atorvastatin (LIPITOR) 20 MG tablet atorvastatin 20 mg tablet   TAKE 1 TABLET BY MOUTH EVERY DAY      budesonide (PULMICORT) 0.5 mg/2 mL nebulizer solution 2 mLs.      budesonide 1 mg/2 mL NbS budesonide 1 mg/2 mL suspension for nebulization   INHALE 2 ML TWICE A DAY BY NEBULIZATION ROUTE AS NEEDED.      busPIRone (BUSPAR) 10 MG tablet Take 10 mg by mouth 2 (two) times daily.      fluticasone propionate (FLONASE) 50 mcg/actuation nasal spray fluticasone propionate 50 mcg/actuation nasal spray,suspension   SPRAY 2 SPRAYS INTO EACH NOSTRIL EVERY DAY      folic acid (FOLVITE) 1  "MG tablet Take 1 tablet (1 mg total) by mouth once daily. 90 tablet 3    leflunomide (ARAVA) 20 MG Tab Take 1 tablet (20 mg total) by mouth once daily. 90 tablet 0    methotrexate, PF, (RASUVO, PF,) 25 mg/0.5 mL AtIn Inject 0.5 mLs (25 mg total) into the skin every 7 days. 4 each 3    predniSONE (DELTASONE) 5 MG tablet Take 2 tablets (10 mg total) by mouth once daily. 60 tablet 1    telmisartan-hydrochlorothiazide (MICARDIS HCT) 80-25 mg per tablet telmisartan 80 mg-hydrochlorothiazide 25 mg tablet   TAKE 1 TABLET BY MOUTH EVERY DAY      tocilizumab 162 mg/0.9 mL PnIj Inject 162 mg into the skin every 7 days. 1 each 5    venlafaxine (EFFEXOR) 37.5 MG Tab Take 37.5 mg by mouth.       No current facility-administered medications on file prior to visit.     Social History:     Social History     Tobacco Use    Smoking status: Never    Smokeless tobacco: Never   Substance Use Topics    Alcohol use: Not on file     Family History:   History reviewed. No pertinent family history.  Physical Exam:   /65 (BP Location: Left arm, Patient Position: Sitting, BP Method: Large (Automatic))   Pulse (!) 120   Ht 5' 7" (1.702 m)   Wt 115.8 kg (255 lb 4.7 oz)   SpO2 97%   BMI 39.98 kg/m²    Wt Readings from Last 4 Encounters:   10/07/22 115.8 kg (255 lb 4.7 oz)   09/28/22 115.2 kg (254 lb)   09/28/22 117 kg (257 lb 15 oz)   08/17/22 115.3 kg (254 lb 3.1 oz)         Constitutional: No distress, obese, conversant  HEENT: Sclera anicteric, PERRLA, EOMI  Neck: No JVD, no masses, good movement  CV: RRR, S1 and S2 normal, no additional heart sounds or murmurs. Pulses 2+ and equal bilaterally in radial arteries, Ata's normal on right. Distal pulses are 2+ and equal in the femoral, DP and PT areas bilaterally  Pulm: Clear to auscultation bilaterally with symmetrical expansion. Chest wall palpated for reproduction of pain symptoms, and no pain was able to be produced on palpation or resistance exercises  GI: Abdomen soft, " non-tender, good bowel sounds  Extremities: Both extremities intact and grossly normal, skin is warm, no edema noted  Skin: No ecchymosis, erythema, or ulcers  Psych: AOx3, appropriate affect  Neuro: CNII-XII intact, no focal deficits      Labs:     Lab Results   Component Value Date     09/28/2022     09/28/2022    K 4.0 09/28/2022    K 4.0 09/28/2022     09/28/2022     09/28/2022    CO2 22 (L) 09/28/2022    CO2 22 (L) 09/28/2022    BUN 10 09/28/2022    BUN 10 09/28/2022    CREATININE 0.9 09/28/2022    CREATININE 0.9 09/28/2022    ANIONGAP 11 09/28/2022    ANIONGAP 11 09/28/2022     No results found for: HGBA1C  No results found for: BNP, BNPTRIAGEBLO Lab Results   Component Value Date    WBC 9.17 09/28/2022    WBC 9.17 09/28/2022    HGB 16.1 09/28/2022    HGB 16.1 09/28/2022    HCT 49.3 09/28/2022    HCT 49.3 09/28/2022     09/28/2022     09/28/2022    GRAN 7.6 09/28/2022    GRAN 82.6 (H) 09/28/2022    GRAN 7.6 09/28/2022    GRAN 82.6 (H) 09/28/2022     Lab Results   Component Value Date    CHOL 192 09/28/2022    HDL 58 09/28/2022    LDLCALC 114.4 09/28/2022    TRIG 98 09/28/2022          Imaging:         EF   Date Value Ref Range Status   09/28/2022 65 % Final       EKG: sinus tachycardia  TTE: 9/28/2022  The left ventricle is normal in size with normal systolic function.  The estimated ejection fraction is 65%.  Normal left ventricular diastolic function.  Normal right ventricular size with normal right ventricular systolic function.    Assessment:    Pt is a 50yo M with pmhx of RA (on leflunomide, tocilizumab, and methotrexate), HTN, HLD who presents to [facility] [place] with CC of sob of breath     Plan:     #Dyspnea on exertion  Unclear etiology.  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  Echo shows normal EF and normal diastology  - CPX ordered  - Holter monitor ordered to monitor for AFib and HR  variability  - RTC in 3 months    #HTN:  BP in clinic today 118/65  - continue home amlodipine 5mg qD and telmisartan-HCTZ 80-25mg qD    #HLD:  - continue home atorvastatin 20mg qD    Keep a home BP diary and bring to next visit  Discussed mediterranean diet  Discussed exercise therapy based on 150-min per week AHA recommendations for moderate intensity exercise/75 min for high-intensity exercise    Signed:  Tyler Duffy MD  Cardiology Fellow  Pager - [phone]  [facility]  10/7/2022 2:58 PM

## 2022-10-07 NOTE — TELEPHONE ENCOUNTER
Spoke with pt's wife.  Actemra runs through insurance for $4196.50 copay still.  Pt's wife said she called insurance bc the payment has been taken out of her account but has not been reflected in the insurance system.  Pt agreed to [facility] F/U Monday.

## 2022-10-09 NOTE — PROGRESS NOTES
I have reviewed the patient's chart and the fellow's clinic note, as well as discussed the case with the fellow. I have personally spoken with and examined the patient in the clinic. I agree with the findings, assessment, and plan.      Delfino Valero MD  [facility] - [address]  .

## 2022-10-10 ENCOUNTER — SPECIALTY PHARMACY (OUTPATIENT)
Dept: PHARMACY | Facility: CLINIC | Age: 52
End: 2022-10-10
Payer: MEDICAID

## 2022-10-10 DIAGNOSIS — M06.9 RHEUMATOID ARTHRITIS, INVOLVING UNSPECIFIED SITE, UNSPECIFIED WHETHER RHEUMATOID FACTOR PRESENT: Primary | ICD-10-CM

## 2022-10-10 DIAGNOSIS — R06.02 SHORTNESS OF BREATH: Primary | ICD-10-CM

## 2022-10-10 NOTE — TELEPHONE ENCOUNTER
Incoming call from patient wife. Per pt's wife pt needs prescription 10/11. Scheduled a call with pharmacist.

## 2022-10-10 NOTE — TELEPHONE ENCOUNTER
Specialty Pharmacy - Initial Clinical Assessment    Specialty Medication Orders Linked to Encounter      Flowsheet Row Most Recent Value   Medication #1 tocilizumab 162 mg/0.9 mL PnIj (Order#[id number], Rx#[id number])          Patient Diagnosis   M06.9 - Rheumatoid arthritis, involving unspecified site, unspecified whether rheumatoid factor present    Subjective    Joaquín Rod is a 52 y.o. male, who is followed by the specialty pharmacy service for management and education.    Recent Encounters       Date Type Provider Description    10/10/2022 Specialty Pharmacy Nicole Roque, PharmD Initial Clinical Assessment    09/29/2022 Specialty Pharmacy Leatha Brooks, [facility] Referral Authorization    09/29/2022 Specialty Pharmacy Leatha Brooks, PharmCALLY Refill Coordination    08/19/2022 Specialty Pharmacy Malik Lilly, PharmD Refill Coordination    07/26/2022 Specialty Pharmacy Catracho Mayberry Refill Coordination          Clinical call attempts since last clinical assessment   No call attempts found.     Current Outpatient Medications   Medication Sig    amLODIPine (NORVASC) 5 MG tablet amlodipine 5 mg tablet   TAKE 1 TABLET BY MOUTH EVERY DAY    atorvastatin (LIPITOR) 20 MG tablet atorvastatin 20 mg tablet   TAKE 1 TABLET BY MOUTH EVERY DAY    budesonide (PULMICORT) 0.5 mg/2 mL nebulizer solution 2 mLs.    budesonide 1 mg/2 mL NbS budesonide 1 mg/2 mL suspension for nebulization   INHALE 2 ML TWICE A DAY BY NEBULIZATION ROUTE AS NEEDED.    busPIRone (BUSPAR) 10 MG tablet Take 10 mg by mouth 2 (two) times daily.    fluticasone propionate (FLONASE) 50 mcg/actuation nasal spray fluticasone propionate 50 mcg/actuation nasal spray,suspension   SPRAY 2 SPRAYS INTO EACH NOSTRIL EVERY DAY    folic acid (FOLVITE) 1 MG tablet Take 1 tablet (1 mg total) by mouth once daily.    leflunomide (ARAVA) 20 MG Tab Take 1 tablet (20 mg total) by mouth once daily.    methotrexate, PF, (RASUVO, PF,) 25 mg/0.5 mL AtIn Inject 0.5 mLs  (25 mg total) into the skin every 7 days.    predniSONE (DELTASONE) 5 MG tablet Take 2 tablets (10 mg total) by mouth once daily.    telmisartan-hydrochlorothiazide (MICARDIS HCT) 80-25 mg per tablet telmisartan 80 mg-hydrochlorothiazide 25 mg tablet   TAKE 1 TABLET BY MOUTH EVERY DAY    tocilizumab 162 mg/0.9 mL PnIj Inject 162 mg into the skin every 7 days.    venlafaxine (EFFEXOR) 37.5 MG Tab Take 37.5 mg by mouth.   Last reviewed on 10/10/2022 11:40 AM by Ramya Mcdonald, PharmD    Review of patient's allergies indicates:  No Known AllergiesLast reviewed on  10/10/2022 11:30 AM by Ramya Mcdonald    Drug Interactions    Drug interactions evaluated: yes  Clinically relevant drug interactions identified: no  Provided the patient with educational material regarding drug interactions: not applicable         Adverse Effects    Activity change: Pos  Arthralgias: Pos  Joint swelling: Pos       Assessment Questions - Documented Responses      Flowsheet Row Most Recent Value   Assessment    Medication Reconciliation completed for patient Yes   During the past 4 weeks, has patient missed any activities due to condition or medication? Missed Work  [Pt's wife stated pt cannot work due to RA.  He quit his job in Feb]   Number of Days missed 28   During the past 4 weeks, did patient have any of the following urgent care visits? None   Goals of Therapy Status Discussed (new start)   Status of the patients ability to self-administer: Is Able   All education points have been covered with patient? Yes, supplemental printed education provided   Welcome packet contents reviewed and discussed with patient? Yes   Assesment completed? Yes   Plan Therapy being initiated   Do you need to open a clinical intervention (i-vent)? No   Do you want to schedule first shipment? Yes   Medication #1 Assessment Info    Patient status New medication, Exisiting to [facility]   Is this medication appropriate for the patient? Yes   Is this medication effective?  "Not yet started          Refill Questions - Documented Responses      Flowsheet Row Most Recent Value   Patient Availability and HIPAA Verification    Does patient want to proceed with activity? Yes   HIPAA/medical authority confirmed? Yes   Relationship to patient of person spoken to? Spouse/Significant Other   Refill Screening Questions    When does the patient need to receive the medication? 10/11/22   Refill Delivery Questions    How will the patient receive the medication? Mail   When does the patient need to receive the medication? 10/11/22   Shipping Address Home   Address in [facility] confirmed and updated if neccessary? Yes   Expected Copay ($) 0   Is the patient able to afford the medication copay? Yes   Payment Method zero copay   Days supply of Refill 28   Supplies needed? No supplies needed   Refill activity completed? Yes   Refill activity plan Refill scheduled   Shipment/Pickup Date: 10/10/22            Objective    He has no past medical history on file.    Tried/failed medications: Orencia, Enbrel, Prednisone  Current therapy: MTX, Arava  (changing from Orencia to Actemra)    BP Readings from Last 4 Encounters:   10/07/22 118/65   09/28/22 135/80   09/28/22 122/86   07/20/22 131/84     Ht Readings from Last 4 Encounters:   10/07/22 5' 7" (1.702 m)   09/28/22 5' 6" (1.676 m)   09/28/22 5' 7" (1.702 m)   08/17/22 5' 6" (1.676 m)     Wt Readings from Last 4 Encounters:   10/07/22 115.8 kg (255 lb 4.7 oz)   09/28/22 115.2 kg (254 lb)   09/28/22 117 kg (257 lb 15 oz)   08/17/22 115.3 kg (254 lb 3.1 oz)     Recent Labs   Lab Result Units 09/28/22  1503 07/20/22  1319   Creatinine mg/dL 0.9  0.9 0.8  0.8   ALT U/L 47 H  47 H 31  31   AST U/L 25  25 21  21   Hepatitis B Surface Ag  Non-reactive  --    Hep B S Ab  <3.00  Non-reactive  --    Hep B Core Total Ab  Non-reactive  --      The goals of rheumatoid arthritis treatment include:  Relieving pain and suppressing inflammation  Achieving " remission and preventing joint and organ damage  Increasing joint mobility and strength  Preventing infection and other complications of treatment  Reducing long term complications of rheumatoid arthritis  Improving or maintaining physical function and optimal well-being  Improving or maintaining quality of life  Maintaining optimal therapy adherence  Minimizing and managing side effects    Goals of Therapy Status: Discussed (new start)    Assessment/Plan  Patient plans to start therapy on 10/11/22  Changing from Orencia to Actemra.  Last Orencia dose 10/3.    Indication, dosage, appropriateness, effectiveness, safety and convenience of his specialty medication(s) were reviewed today.     Patient Education   Patient received education on the following:   Expectations and possible outcomes of therapy  Proper use, timely administration, and missed dose management  Duration of therapy  Side effects, including prevention, minimization, and management  Contraindications and safety precautions  New or changed medications, including prescribe and over the counter medications and supplements  Reviews recommended vaccinations, as appropriate  Storage, safe handling, and disposal        Tasks added this encounter   No tasks added.   Tasks due within next 3 months   11/1/2022 - Refill Call (Auto Added)     [facility]  [address]  [facility] [address]  Phone: [phone]  Fax: [phone]

## 2022-10-13 DIAGNOSIS — E78.5 DYSLIPIDEMIA: ICD-10-CM

## 2022-10-13 DIAGNOSIS — R06.02 SHORTNESS OF BREATH: Primary | ICD-10-CM

## 2022-10-13 DIAGNOSIS — I10 PRIMARY HYPERTENSION: ICD-10-CM

## 2022-10-13 DIAGNOSIS — I10 ESSENTIAL HYPERTENSION: ICD-10-CM

## 2022-10-19 ENCOUNTER — CLINICAL SUPPORT (OUTPATIENT)
Dept: CARDIOLOGY | Facility: HOSPITAL | Age: 52
End: 2022-10-19
Attending: STUDENT IN AN ORGANIZED HEALTH CARE EDUCATION/TRAINING PROGRAM
Payer: MEDICAID

## 2022-10-19 ENCOUNTER — HOSPITAL ENCOUNTER (OUTPATIENT)
Dept: CARDIOLOGY | Facility: HOSPITAL | Age: 52
Discharge: HOME | End: 2022-10-19
Attending: STUDENT IN AN ORGANIZED HEALTH CARE EDUCATION/TRAINING PROGRAM
Payer: MEDICAID

## 2022-10-19 ENCOUNTER — TELEPHONE (OUTPATIENT)
Dept: RHEUMATOLOGY | Facility: CLINIC | Age: 52
End: 2022-10-19
Payer: MEDICAID

## 2022-10-19 VITALS
SYSTOLIC BLOOD PRESSURE: 104 MMHG | BODY MASS INDEX: 39.55 KG/M2 | WEIGHT: 252 LBS | HEIGHT: 67 IN | DIASTOLIC BLOOD PRESSURE: 71 MMHG | HEART RATE: 107 BPM

## 2022-10-19 DIAGNOSIS — R06.00 DYSPNEA, UNSPECIFIED TYPE: ICD-10-CM

## 2022-10-19 LAB
CV STRESS BASE HR: 107 BPM
DIASTOLIC BLOOD PRESSURE: 71 MMHG
OHS CV CPX 1 MINUTE RECOVERY HEART RATE: 146 BPM
OHS CV CPX 85 PERCENT MAX PREDICTED HEART RATE MALE: 142
OHS CV CPX DATA GRADE - PEAK: 4.2
OHS CV CPX DATA O2 SAT - PEAK: 96
OHS CV CPX DATA O2 SAT - REST: 96
OHS CV CPX DATA SPEED - PEAK: 2.2
OHS CV CPX DATA TIME - PEAK: 6.34
OHS CV CPX DATA VE/VCO2 - PEAK: 37
OHS CV CPX DATA VE/VO2 - PEAK: 37
OHS CV CPX DATA VO2 - PEAK: 12.8
OHS CV CPX DATA VO2 - REST: 4.3
OHS CV CPX FEV1/FVC: 0.9
OHS CV CPX FORCED EXPIRATORY VOLUME: 1.99
OHS CV CPX FORCED VITAL CAPACITY (FVC): 2.22
OHS CV CPX HIGHEST VO: 36.7
OHS CV CPX MAX PREDICTED HEART RATE: 167
OHS CV CPX MAXIMAL VOLUNTARY VENTILATION (MVV) PREDICTED: 79.6
OHS CV CPX MAXIMAL VOLUNTARY VENTILATION (MVV): 40
OHS CV CPX MAXIUMUM EXERCISE VENTILATION (VE MAX): 43.3
OHS CV CPX PATIENT AGE: 53
OHS CV CPX PATIENT HEIGHT IN: 67
OHS CV CPX PATIENT IS FEMALE AGE 11-19: 0
OHS CV CPX PATIENT IS FEMALE AGE GREATER THAN 19: 0
OHS CV CPX PATIENT IS FEMALE AGE LESS THAN 11: 0
OHS CV CPX PATIENT IS FEMALE: 0
OHS CV CPX PATIENT IS MALE AGE 11-25: 0
OHS CV CPX PATIENT IS MALE AGE GREATER THAN 25: 1
OHS CV CPX PATIENT IS MALE AGE LESS THAN 11: 0
OHS CV CPX PATIENT IS MALE GREATER THAN 18: 1
OHS CV CPX PATIENT IS MALE LESS THAN OR EQUAL TO 18: 0
OHS CV CPX PATIENT IS MALE: 1
OHS CV CPX PATIENT WEIGHT RETURNED IN OZ: 4032
OHS CV CPX PEAK DIASTOLIC BLOOD PRESSURE: 82 MMHG
OHS CV CPX PEAK HEAR RATE: 155 BPM
OHS CV CPX PEAK RATE PRESSURE PRODUCT: NORMAL
OHS CV CPX PEAK SYSTOLIC BLOOD PRESSURE: 145 MMHG
OHS CV CPX PERCENT BODY FAT: 30.5
OHS CV CPX PERCENT MAX PREDICTED HEART RATE ACHIEVED: 93
OHS CV CPX PREDICTED VO2: 36.7 ML/KG/MIN
OHS CV CPX RATE PRESSURE PRODUCT PRESENTING: NORMAL
OHS CV CPX REST PET CO2: 27
OHS CV CPX VE/VCO2 SLOPE: 35.2
STRESS ECHO POST EXERCISE DUR MIN: 6 MINUTES
STRESS ECHO POST EXERCISE DUR SEC: 34 SECONDS
SYSTOLIC BLOOD PRESSURE: 104 MMHG

## 2022-10-19 PROCEDURE — 94621 CARDIOPULMONARY EXERCISE TESTING (CUPID ONLY): ICD-10-PCS | Mod: 26,,, | Performed by: INTERNAL MEDICINE

## 2022-10-19 PROCEDURE — 93227 XTRNL ECG REC<48 HR R&I: CPT | Mod: ,,, | Performed by: INTERNAL MEDICINE

## 2022-10-19 PROCEDURE — 93225 XTRNL ECG REC<48 HRS REC: CPT

## 2022-10-19 PROCEDURE — 93227 HOLTER MONITOR - 48 HOUR (CUPID ONLY): ICD-10-PCS | Mod: ,,, | Performed by: INTERNAL MEDICINE

## 2022-10-19 PROCEDURE — 94621 CARDIOPULM EXERCISE TESTING: CPT

## 2022-10-19 PROCEDURE — 94621 CARDIOPULM EXERCISE TESTING: CPT | Mod: 26,,, | Performed by: INTERNAL MEDICINE

## 2022-10-19 NOTE — TELEPHONE ENCOUNTER
Frandy, please call pt and be sure has started tocilizumab. If so he will need monthly cbc, cmp monthly x 3 months, with lipids in 3 months. Also if he is doing better we need to start tapering his prednisone as he is quite Cushingoid. Thank you MARKOS

## 2022-10-25 LAB
OHS CV EVENT MONITOR DAY: 0
OHS CV HOLTER LENGTH DECIMAL HOURS: 48
OHS CV HOLTER LENGTH HOURS: 48
OHS CV HOLTER LENGTH MINUTES: 0
OHS CV HOLTER SINUS AVERAGE HR: 91
OHS CV HOLTER SINUS MAX HR: 146
OHS CV HOLTER SINUS MIN HR: 53

## 2022-10-31 ENCOUNTER — PATIENT MESSAGE (OUTPATIENT)
Dept: RHEUMATOLOGY | Facility: CLINIC | Age: 52
End: 2022-10-31
Payer: MEDICAID

## 2022-10-31 ENCOUNTER — SPECIALTY PHARMACY (OUTPATIENT)
Dept: PHARMACY | Facility: CLINIC | Age: 52
End: 2022-10-31
Payer: MEDICAID

## 2022-10-31 ENCOUNTER — TELEPHONE (OUTPATIENT)
Dept: RHEUMATOLOGY | Facility: CLINIC | Age: 52
End: 2022-10-31
Payer: MEDICAID

## 2022-10-31 DIAGNOSIS — R74.01 ALT (SGPT) LEVEL RAISED: Primary | ICD-10-CM

## 2022-10-31 DIAGNOSIS — M06.9 RHEUMATOID ARTHRITIS, INVOLVING UNSPECIFIED SITE, UNSPECIFIED WHETHER RHEUMATOID FACTOR PRESENT: Primary | ICD-10-CM

## 2022-10-31 RX ORDER — METHOTREXATE 25 MG/.5ML
25 INJECTION, SOLUTION SUBCUTANEOUS
Qty: 4 EACH | Refills: 3 | Status: CANCELLED | OUTPATIENT
Start: 2022-10-31

## 2022-10-31 RX ORDER — METHOTREXATE 25 MG/.5ML
25 INJECTION, SOLUTION SUBCUTANEOUS
Qty: 4 EACH | Refills: 1 | Status: SHIPPED | OUTPATIENT
Start: 2022-10-31 | End: 2022-11-14 | Stop reason: SDUPTHER

## 2022-10-31 RX ORDER — METHOTREXATE 25 MG/.5ML
25 INJECTION, SOLUTION SUBCUTANEOUS
Qty: 4 EACH | Refills: 3 | OUTPATIENT
Start: 2022-10-31

## 2022-10-31 NOTE — TELEPHONE ENCOUNTER
Incoming call from patient's wife for Actemra and Rasuvo refills. Rasuvo is out of refills and refill request was denied. Patient's wife stated she would call [facility] about this. Refill request sent again. Patient will call back to have both prescriptions set up together.

## 2022-10-31 NOTE — TELEPHONE ENCOUNTER
Message received from nurse. Refill request has been refused due to Dr. Sanchez not being located at that office. Refill request has been resent to Dr. Mcgovern

## 2022-11-01 ENCOUNTER — PATIENT MESSAGE (OUTPATIENT)
Dept: RHEUMATOLOGY | Facility: CLINIC | Age: 52
End: 2022-11-01
Payer: MEDICAID

## 2022-11-01 NOTE — TELEPHONE ENCOUNTER
Rasuvo prior auth required. Initiated request on CMMs. Clinical questions pending. Patient's wife stated they will need a refill by 11/5. Will continue to follow up.

## 2022-11-02 RX ORDER — METHOTREXATE 25 MG/.5ML
25 INJECTION, SOLUTION SUBCUTANEOUS
Qty: 1 EACH | Refills: 5 | Status: SHIPPED | OUTPATIENT
Start: 2022-11-02 | End: 2023-01-10 | Stop reason: SDUPTHER

## 2022-11-02 NOTE — TELEPHONE ENCOUNTER
"CMMs sent prior auth without clinical questions to plan. Called insurance at [phone] to check status. PA denied "no evidence of clinical response"     Rep stated a reconsideration can be submitted to fax [phone] with denial letter attached. Requested denial letter be faxed to [facility]. Will follow up.   "

## 2022-11-03 ENCOUNTER — PATIENT MESSAGE (OUTPATIENT)
Dept: PHARMACY | Facility: CLINIC | Age: 52
End: 2022-11-03
Payer: MEDICAID

## 2022-11-03 NOTE — TELEPHONE ENCOUNTER
Rasuvo PA approved  Tracking ID [id number]  11/2/22-11/2/23 2022 BI already completed for this med.     Called to set up refill for Actemra and Rasuvo- alfred to LVM.  Sent MyChart.

## 2022-11-03 NOTE — TELEPHONE ENCOUNTER
Specialty Pharmacy - Refill Coordination  Specialty Pharmacy - Medication/Referral Authorization    Specialty Medication Orders Linked to Encounter      Flowsheet Row Most Recent Value   Medication #1 methotrexate, PF, (RASUVO, PF,) 25 mg/0.5 mL AtIn (Order#[id number], Rx#[id number])   Medication #2 tocilizumab 162 mg/0.9 mL PnIj (Order#[id number], Rx#[id number])            Refill Questions - Documented Responses      Flowsheet Row Most Recent Value   Patient Availability and HIPAA Verification    Does patient want to proceed with activity? Yes   HIPAA/medical authority confirmed? Yes   Relationship to patient of person spoken to? Spouse/Significant Other   Refill Screening Questions    Changes to allergies? No   Changes to medications? No   New conditions since last clinic visit? No   Unplanned office visit, urgent care, ED, or hospital admission in the last 4 weeks? No   How does patient/caregiver feel medication is working? Very good   Financial problems or insurance changes? No   How many doses of your specialty medications were missed in the last 4 weeks? 0   Would patient like to speak to a pharmacist? No   When does the patient need to receive the medication? 11/05/22   Refill Delivery Questions    How will the patient receive the medication? Mail   When does the patient need to receive the medication? 11/05/22   Shipping Address Home   Address in [facility] confirmed and updated if neccessary? Yes   Expected Copay ($) 0   Is the patient able to afford the medication copay? Yes   Payment Method zero copay   Days supply of Refill 28   Supplies needed? No supplies needed   Refill activity completed? Yes   Refill activity plan Refill scheduled   Shipment/Pickup Date: 11/03/22            Current Outpatient Medications   Medication Sig    amLODIPine (NORVASC) 5 MG tablet amlodipine 5 mg tablet   TAKE 1 TABLET BY MOUTH EVERY DAY    atorvastatin (LIPITOR) 20 MG tablet atorvastatin 20 mg tablet   TAKE 1 TABLET  BY MOUTH EVERY DAY    budesonide (PULMICORT) 0.5 mg/2 mL nebulizer solution 2 mLs.    budesonide 1 mg/2 mL NbS budesonide 1 mg/2 mL suspension for nebulization   INHALE 2 ML TWICE A DAY BY NEBULIZATION ROUTE AS NEEDED.    busPIRone (BUSPAR) 10 MG tablet Take 10 mg by mouth 2 (two) times daily.    fluticasone propionate (FLONASE) 50 mcg/actuation nasal spray fluticasone propionate 50 mcg/actuation nasal spray,suspension   SPRAY 2 SPRAYS INTO EACH NOSTRIL EVERY DAY    folic acid (FOLVITE) 1 MG tablet Take 1 tablet (1 mg total) by mouth once daily.    leflunomide (ARAVA) 20 MG Tab Take 1 tablet (20 mg total) by mouth once daily.    methotrexate, PF, (RASUVO, PF,) 25 mg/0.5 mL AtIn Inject 0.5 mLs (25 mg total) into the skin every 7 days.    methotrexate, PF, (RASUVO, PF,) 25 mg/0.5 mL AtIn Inject 0.5 mLs (25 mg total) into the skin every 7 days.    predniSONE (DELTASONE) 5 MG tablet Take 2 tablets (10 mg total) by mouth once daily.    telmisartan-hydrochlorothiazide (MICARDIS HCT) 80-25 mg per tablet telmisartan 80 mg-hydrochlorothiazide 25 mg tablet   TAKE 1 TABLET BY MOUTH EVERY DAY    tocilizumab 162 mg/0.9 mL PnIj Inject 162 mg into the skin every 7 days.    venlafaxine (EFFEXOR) 37.5 MG Tab Take 37.5 mg by mouth.   Last reviewed on 10/10/2022 11:40 AM by Ramya Mcdonald PharmD    Review of patient's allergies indicates:  No Known Allergies Last reviewed on  11/2/2022 9:21 AM by Roberto Carlos Tamez      Tasks added this encounter   11/26/2022 - Refill Call (Auto Added)  11/26/2022 - Refill Call (Auto Added)   Tasks due within next 3 months   1/18/2023 - Clinical - Follow Up Assesement (Annual)     Vince Fitzpatrick, PharmD  [facility]  [address] [place] [address]  Phone: [phone]  Fax: [phone]

## 2022-11-14 ENCOUNTER — SPECIALTY PHARMACY (OUTPATIENT)
Dept: PHARMACY | Facility: CLINIC | Age: 52
End: 2022-11-14
Payer: MEDICAID

## 2022-11-14 DIAGNOSIS — M06.9 RHEUMATOID ARTHRITIS, INVOLVING UNSPECIFIED SITE, UNSPECIFIED WHETHER RHEUMATOID FACTOR PRESENT: Primary | ICD-10-CM

## 2022-11-14 NOTE — TELEPHONE ENCOUNTER
Pts wife, Bree called states pt had his COVID booster vaccine today and he is on Rasuvo weekly inject Saturdays and Actemra weekly injects Tuesdays (due for Actemra dose tomorrow). Recent ACR guidelines recommends MTX hold for 1 to 2 weeks after COVID vaccine as allowed by disease activity. Wife voiced understanding and confirmed his RA symptoms currently stable without issues.      In terms of Actemra there is no clear recommendation on treatment hold. Message sent to rheum to see whether pt should hold Actemra along with MTX or if he can proceed with Actemra Tuesday injections. Wife states he injects in the evening and agreed to call back tomorrow for advisement after provider repsonse. Reminder set, ivent open.

## 2022-11-15 NOTE — TELEPHONE ENCOUNTER
Per Dr. Mcgovern pt is to hold both Rasuvo and Actemra for 1 week. Call attempt to pt to notify, no answer- LVM.

## 2022-11-15 NOTE — TELEPHONE ENCOUNTER
Pt wife Bree called back. Informed he is to hold BOTH Rasuvo and Actemra for 1 week. Advised hold Rasuvo this week and resume Sat 11/26 and hold Actemra as well for today's dose, resume Actemra next Tuesday 11/22. Voiced understanding, no questions.

## 2022-11-28 ENCOUNTER — PATIENT MESSAGE (OUTPATIENT)
Dept: PHARMACY | Facility: CLINIC | Age: 52
End: 2022-11-28
Payer: MEDICAID

## 2022-11-28 ENCOUNTER — SPECIALTY PHARMACY (OUTPATIENT)
Dept: PHARMACY | Facility: CLINIC | Age: 52
End: 2022-11-28
Payer: MEDICAID

## 2022-11-28 NOTE — TELEPHONE ENCOUNTER
Specialty Pharmacy - Refill Coordination    Specialty Medication Orders Linked to Encounter      Flowsheet Row Most Recent Value   Medication #1 tocilizumab 162 mg/0.9 mL PnIj (Order#[id number], Rx#[id number])   Medication #2 methotrexate, PF, (RASUVO, PF,) 25 mg/0.5 mL AtIn (Order#[id number], Rx#[id number])            Refill Questions - Documented Responses      Flowsheet Row Most Recent Value   Patient Availability and HIPAA Verification    Does patient want to proceed with activity? Yes   HIPAA/medical authority confirmed? Yes   Relationship to patient of person spoken to? Spouse/Significant Other   Refill Screening Questions    Changes to allergies? No   Changes to medications? No   New conditions since last clinic visit? No   Unplanned office visit, urgent care, ED, or hospital admission in the last 4 weeks? No   How does patient/caregiver feel medication is working? Good   Financial problems or insurance changes? No   How many doses of your specialty medications were missed in the last 4 weeks? 0   Would patient like to speak to a pharmacist? No   When does the patient need to receive the medication? 12/06/22   Refill Delivery Questions    How will the patient receive the medication? Mail   When does the patient need to receive the medication? 12/06/22   Shipping Address Home   Address in [facility] confirmed and updated if neccessary? Yes   Expected Copay ($) 0   Is the patient able to afford the medication copay? Yes   Payment Method zero copay   Days supply of Refill 28   Supplies needed? No supplies needed   Refill activity completed? Yes   Refill activity plan Refill scheduled   Shipment/Pickup Date: 12/05/22            Current Outpatient Medications   Medication Sig    amLODIPine (NORVASC) 5 MG tablet amlodipine 5 mg tablet   TAKE 1 TABLET BY MOUTH EVERY DAY    atorvastatin (LIPITOR) 20 MG tablet atorvastatin 20 mg tablet   TAKE 1 TABLET BY MOUTH EVERY DAY    budesonide (PULMICORT) 0.5 mg/2 mL  nebulizer solution 2 mLs.    budesonide 1 mg/2 mL NbSp budesonide 1 mg/2 mL suspension for nebulization   INHALE 2 ML TWICE A DAY BY NEBULIZATION ROUTE AS NEEDED.    busPIRone (BUSPAR) 10 MG tablet Take 10 mg by mouth 2 (two) times daily.    fluticasone propionate (FLONASE) 50 mcg/actuation nasal spray fluticasone propionate 50 mcg/actuation nasal spray,suspension   SPRAY 2 SPRAYS INTO EACH NOSTRIL EVERY DAY    folic acid (FOLVITE) 1 MG tablet Take 1 tablet (1 mg total) by mouth once daily.    leflunomide (ARAVA) 20 MG Tab Take 1 tablet (20 mg total) by mouth once daily.    methotrexate, PF, (RASUVO, PF,) 25 mg/0.5 mL AtIn Inject 0.5 mLs (25 mg total) into the skin every 7 days.    predniSONE (DELTASONE) 5 MG tablet Take 2 tablets (10 mg total) by mouth once daily.    telmisartan-hydrochlorothiazide (MICARDIS HCT) 80-25 mg per tablet telmisartan 80 mg-hydrochlorothiazide 25 mg tablet   TAKE 1 TABLET BY MOUTH EVERY DAY    tocilizumab 162 mg/0.9 mL PnIj Inject 162 mg into the skin every 7 days.    venlafaxine (EFFEXOR) 37.5 MG Tab Take 37.5 mg by mouth.   Last reviewed on 10/10/2022 11:40 AM by Ramya Mcdonald, Sekou    Review of patient's allergies indicates:  No Known Allergies Last reviewed on  11/2/2022 9:21 AM by Roberto Carlos Tamez      Tasks added this encounter   12/27/2022 - Refill Call (Auto Added)  12/27/2022 - Refill Call (Auto Added)   Tasks due within next 3 months   1/18/2023 - Clinical - Follow Up Assesement (Annual)     [facility]  [address] [place] [address]  Phone: [phone]  Fax: [phone]

## 2022-12-27 ENCOUNTER — SPECIALTY PHARMACY (OUTPATIENT)
Dept: PHARMACY | Facility: CLINIC | Age: 52
End: 2022-12-27

## 2022-12-27 NOTE — TELEPHONE ENCOUNTER
Outgoing call: Spoke with patient wife regarding both refills, she stated he has 3 pens on hand, 1 which he is injecting today and the other 2 for the following Tuesday's. She stated he had the flu and some other issues that's why he missed a few doses. I informed her that [facility] will follow up in 2 weeks to set up refill.

## 2023-01-03 RX ORDER — TOCILIZUMAB 180 MG/ML
162 INJECTION, SOLUTION SUBCUTANEOUS
Qty: 3.6 ML | Refills: 2 | Status: SHIPPED | OUTPATIENT
Start: 2023-01-03 | End: 2023-01-29

## 2023-01-10 ENCOUNTER — TELEPHONE (OUTPATIENT)
Dept: RHEUMATOLOGY | Facility: CLINIC | Age: 53
End: 2023-01-10
Payer: MEDICAID

## 2023-01-10 DIAGNOSIS — Z51.81 ENCOUNTER FOR MONITORING TOCILIZUMAB THERAPY: Primary | ICD-10-CM

## 2023-01-10 DIAGNOSIS — M06.9 RHEUMATOID ARTHRITIS, INVOLVING UNSPECIFIED SITE, UNSPECIFIED WHETHER RHEUMATOID FACTOR PRESENT: Primary | ICD-10-CM

## 2023-01-10 DIAGNOSIS — Z79.899 ENCOUNTER FOR MONITORING TOCILIZUMAB THERAPY: Primary | ICD-10-CM

## 2023-01-10 RX ORDER — METHOTREXATE 25 MG/.5ML
25 INJECTION, SOLUTION SUBCUTANEOUS
Qty: 4 EACH | Refills: 0 | Status: SHIPPED | OUTPATIENT
Start: 2023-01-10 | End: 2023-01-29 | Stop reason: SDUPTHER

## 2023-01-10 NOTE — TELEPHONE ENCOUNTER
Patient's wife states that patient held one more dose of Rasuvo and Actemra after taking on 12/27. Family members had COVID after new years, and patient was hesitant to take medication. Has two pens of each medication on hand. Aware of missed dose instructions and infection precautions, no questions or concerns at this time.     Rasuvo prescription does not have enough quantity on hand to dispense. Request sent to MD. [facility] will follow up for refills.

## 2023-01-11 ENCOUNTER — TELEPHONE (OUTPATIENT)
Dept: RHEUMATOLOGY | Facility: CLINIC | Age: 53
End: 2023-01-11
Payer: MEDICAID

## 2023-01-11 ENCOUNTER — PATIENT MESSAGE (OUTPATIENT)
Dept: RHEUMATOLOGY | Facility: CLINIC | Age: 53
End: 2023-01-11
Payer: MEDICAID

## 2023-01-11 NOTE — TELEPHONE ENCOUNTER
Attempted to reach out to patient to schedule overdue labs. Labs scheduled sent patient message on the portal with appt time and date. Unable to leave voicemail.   Thank you,   EASTON

## 2023-01-11 NOTE — TELEPHONE ENCOUNTER
Incoming call from patient's wife in regards to medication. Rx received for Rasuvo. Patient's wife stated that he has two doses on hand for both Rasuvo and Actemra. Advised that [facility] will follow up in a week to schedule refill.

## 2023-01-18 ENCOUNTER — PATIENT MESSAGE (OUTPATIENT)
Dept: PHARMACY | Facility: CLINIC | Age: 53
End: 2023-01-18
Payer: MEDICAID

## 2023-01-19 NOTE — TELEPHONE ENCOUNTER
Incoming call from pt wife reporting new [place] Medicaid insurance. The only ID number she has is [id number]. She reports the Medicaid phone number is [phone] and would like us to call to find her prescription insurance information.    Outgoing call to Medicaid number, rep Davis confirmed pt has Medicaid but prescription insurance information is only available at the pharmacy department at [phone]. Outgoing call to pharmacy department, rep Palacio provided information, entered into [employer].     Test claim rejection reports provider not a Medicaid provider. Routing to Ramya for follow up.     BIN [id number]  N [id number]  ID [id number]

## 2023-01-23 NOTE — TELEPHONE ENCOUNTER
Incoming call from pts wife checking status of Actemra and Rasuvo. Informed her of previous note and that [facility] will reach out once approved. Voiced understanding.

## 2023-01-23 NOTE — TELEPHONE ENCOUNTER
"Actemra and Rasuvo are rejecting "BILLING PROVIDER ID IS NOT ON FILE. PRESCRIBER IS NOT A MEDICAID PROVIDER. RENEA BETTS HAS 90 DAYS FROM THE DATE OFFI RST CLAIM SUBMITTED TO MEDICAID TO ENROL L AS PROVIDER UNTIL CLAIMS DENY."    Called [place] Medicaid ([phone]).  Spoke with rep V.   Dr Mcgovern is enrolled as [place] Medicaid provider. [facility] is OON.    Actemra and Gina need PA.    Rep recommended to fax PA.  He guided me to the [place] Medicaid PA form.   "

## 2023-01-24 ENCOUNTER — SPECIALTY PHARMACY (OUTPATIENT)
Dept: PHARMACY | Facility: CLINIC | Age: 53
End: 2023-01-24
Payer: MEDICAID

## 2023-01-24 DIAGNOSIS — M06.9 RHEUMATOID ARTHRITIS, INVOLVING UNSPECIFIED SITE, UNSPECIFIED WHETHER RHEUMATOID FACTOR PRESENT: Primary | ICD-10-CM

## 2023-01-24 NOTE — TELEPHONE ENCOUNTER
Urgent Rasuvo PA faxed to [place] Medicaid [phone]    Urgent Actemra PA faxed [place] Medicaid [phone]

## 2023-01-25 ENCOUNTER — TELEPHONE (OUTPATIENT)
Dept: TRANSPLANT | Facility: CLINIC | Age: 53
End: 2023-01-25
Payer: MEDICAID

## 2023-01-25 ENCOUNTER — TELEPHONE (OUTPATIENT)
Dept: RHEUMATOLOGY | Facility: CLINIC | Age: 53
End: 2023-01-25
Payer: MEDICAID

## 2023-01-25 ENCOUNTER — OFFICE VISIT (OUTPATIENT)
Dept: RHEUMATOLOGY | Facility: CLINIC | Age: 53
End: 2023-01-25
Payer: MEDICAID

## 2023-01-25 VITALS
HEIGHT: 68 IN | WEIGHT: 270.06 LBS | BODY MASS INDEX: 40.93 KG/M2 | HEART RATE: 99 BPM | DIASTOLIC BLOOD PRESSURE: 68 MMHG | SYSTOLIC BLOOD PRESSURE: 112 MMHG

## 2023-01-25 DIAGNOSIS — M06.09 RHEUMATOID ARTHRITIS OF MULTIPLE SITES WITH NEGATIVE RHEUMATOID FACTOR: Primary | ICD-10-CM

## 2023-01-25 DIAGNOSIS — M06.9 RHEUMATOID ARTHRITIS, INVOLVING UNSPECIFIED SITE, UNSPECIFIED WHETHER RHEUMATOID FACTOR PRESENT: Primary | ICD-10-CM

## 2023-01-25 PROCEDURE — 3008F PR BODY MASS INDEX (BMI) DOCUMENTED: ICD-10-PCS | Mod: CPTII,,, | Performed by: INTERNAL MEDICINE

## 2023-01-25 PROCEDURE — 99213 OFFICE O/P EST LOW 20 MIN: CPT | Mod: S$PBB,,, | Performed by: INTERNAL MEDICINE

## 2023-01-25 PROCEDURE — 3074F PR MOST RECENT SYSTOLIC BLOOD PRESSURE < 130 MM HG: ICD-10-PCS | Mod: CPTII,,, | Performed by: INTERNAL MEDICINE

## 2023-01-25 PROCEDURE — 1159F PR MEDICATION LIST DOCUMENTED IN MEDICAL RECORD: ICD-10-PCS | Mod: CPTII,,, | Performed by: INTERNAL MEDICINE

## 2023-01-25 PROCEDURE — 3008F BODY MASS INDEX DOCD: CPT | Mod: CPTII,,, | Performed by: INTERNAL MEDICINE

## 2023-01-25 PROCEDURE — 3078F DIAST BP <80 MM HG: CPT | Mod: CPTII,,, | Performed by: INTERNAL MEDICINE

## 2023-01-25 PROCEDURE — 99999 PR PBB SHADOW E&M-EST. PATIENT-LVL IV: ICD-10-PCS | Mod: PBBFAC,,, | Performed by: INTERNAL MEDICINE

## 2023-01-25 PROCEDURE — 3074F SYST BP LT 130 MM HG: CPT | Mod: CPTII,,, | Performed by: INTERNAL MEDICINE

## 2023-01-25 PROCEDURE — 1159F MED LIST DOCD IN RCRD: CPT | Mod: CPTII,,, | Performed by: INTERNAL MEDICINE

## 2023-01-25 PROCEDURE — 99213 PR OFFICE/OUTPT VISIT, EST, LEVL III, 20-29 MIN: ICD-10-PCS | Mod: S$PBB,,, | Performed by: INTERNAL MEDICINE

## 2023-01-25 PROCEDURE — 99214 OFFICE O/P EST MOD 30 MIN: CPT | Mod: PBBFAC | Performed by: INTERNAL MEDICINE

## 2023-01-25 PROCEDURE — 3078F PR MOST RECENT DIASTOLIC BLOOD PRESSURE < 80 MM HG: ICD-10-PCS | Mod: CPTII,,, | Performed by: INTERNAL MEDICINE

## 2023-01-25 PROCEDURE — 99999 PR PBB SHADOW E&M-EST. PATIENT-LVL IV: CPT | Mod: PBBFAC,,, | Performed by: INTERNAL MEDICINE

## 2023-01-25 RX ORDER — ACETAMINOPHEN 325 MG/1
650 TABLET ORAL
Status: CANCELLED | OUTPATIENT
Start: 2023-02-01

## 2023-01-25 RX ORDER — IVERMECTIN 3 MG/1
200 TABLET ORAL DAILY
Qty: 32 TABLET | Refills: 0 | Status: SHIPPED | OUTPATIENT
Start: 2023-01-25 | End: 2023-01-29

## 2023-01-25 RX ORDER — FAMOTIDINE 10 MG/ML
20 INJECTION INTRAVENOUS
Status: CANCELLED | OUTPATIENT
Start: 2023-02-01

## 2023-01-25 RX ORDER — SODIUM CHLORIDE 0.9 % (FLUSH) 0.9 %
10 SYRINGE (ML) INJECTION
Status: CANCELLED | OUTPATIENT
Start: 2023-02-01

## 2023-01-25 RX ORDER — HEPARIN 100 UNIT/ML
500 SYRINGE INTRAVENOUS
Status: CANCELLED | OUTPATIENT
Start: 2023-02-01

## 2023-01-25 RX ORDER — SULFAMETHOXAZOLE AND TRIMETHOPRIM 400; 80 MG/1; MG/1
1 TABLET ORAL
Qty: 60 TABLET | Refills: 3 | Status: SHIPPED | OUTPATIENT
Start: 2023-01-25 | End: 2023-05-03 | Stop reason: SDUPTHER

## 2023-01-25 ASSESSMENT — ROUTINE ASSESSMENT OF PATIENT INDEX DATA (RAPID3)
PSYCHOLOGICAL DISTRESS SCORE: 5.5
PAIN SCORE: 7.5
AM STIFFNESS SCORE: 1, YES
FATIGUE SCORE: 7
PATIENT GLOBAL ASSESSMENT SCORE: 7
TOTAL RAPID3 SCORE: 7.05
WHEN YOU AWAKENED IN THE MORNING OVER THE LAST WEEK, PLEASE INDICATE THE AMOUNT OF TIME IT TAKES UNTIL YOU ARE AS LIMBER AS YOU WILL BE FOR THE DAY: 60 MIN
MDHAQ FUNCTION SCORE: 2

## 2023-01-25 NOTE — TELEPHONE ENCOUNTER
Advanced Lung Disease referral received from Dr. Tamez.  Message sent to Dr. Lemos for review.  Awaiting recommendations.

## 2023-01-25 NOTE — TELEPHONE ENCOUNTER
"Attempted to reach patient regarding scheduling initial visit with ALD team. No answer unable to leave voicemail on any number.  ----- Message from Kobe Lemos MD sent at 2023  2:38 PM CST -----  Regarding: RE: ALD Referral  ALD routine   Testing: PFTs and 6MWT with continuous/1 min interval SPO2 monitoring    ----- Message -----  From: Shelley DURÁN Do, RN  Sent: 2023   2:32 PM CST  To: Patt Goins, Kobe Lemos MD  Subject: ALD Referral                                     Advanced Lung Disease (ALD) Clinic Referral Note    Referral from:  Dr. Roberto Carlos Tamez    Lung diagnosis: Rheumatoid Disease,     Age: 52 y.o.    Height/Weight/BMI:  Ht: 5'8"  / Wt: 122.5 kg  / Body mass index is 41.06 kg/m².    Smoking history: Social History    Tobacco Use      Smoking status: Never      Smokeless tobacco: Never    PFT date: 2022  (Repeat PFT scheduled for 23)  FVC               3.57     83.9  FEV1             3.13      93  FEV1/FVC    88     110.7  TLC              4.73    74.9  DLCO          24.42    89.2    6 MWT 2022  Distance:  800 feet   On RA-  Pre: 96%, Durin%, Post: 96%   Six minute walk distance is 243.84 meters (800 feet) with heavy dyspnea.  During exercise, there was no desaturation while breathing room air.  Blood pressure remained stable and Heart rate increased significantly with walking.  The patient did not report non-pulmonary symptoms during exercise.  Significant exercise impairment is likely due to subjective symptoms.  The patient did complete the study, walking 360 seconds of the 360 second test.  No previous study performed.  Based upon age and body mass index, exercise capacity is less than predicted.     CXR date: 2021  Impression:  Cardiac size is normal.  Lungs are clear and no infiltrate is seen    Chest CT date: 10/07/2022  Impression:  1.  No CT evidence for interstitial pneumonia.  2. circumscribed hypodense lesion within the liver, stable in " consistent with hepatic cysts versus hemangiomas.    Echo date: 09/28/2022    Impression:  · The left ventricle is normal in size with normal systolic function.  · The estimated ejection fraction is 65%.  · Normal left ventricular diastolic function.  · Normal right ventricular size with normal right ventricular systolic function      Other pertinent medical history:  Current eval with cardiology to determine cause of ESCOBAR    Send Recommendations.

## 2023-01-26 DIAGNOSIS — M06.9 RHEUMATOID ARTHRITIS, INVOLVING UNSPECIFIED SITE, UNSPECIFIED WHETHER RHEUMATOID FACTOR PRESENT: Primary | ICD-10-CM

## 2023-01-26 NOTE — TELEPHONE ENCOUNTER
Patient requesting to reschedule [facility] appt.    Thank you,   Bernice     ----- Message from Richard Martin MA sent at 1/26/2023  8:52 AM CST -----  Regarding: Returning call  Patient's wife states someone called, but doesn't know who, wasn't able to get to the phone quick enough. Please call and advise.          Bree chavez (Spouse)   [phone] (Mobile)

## 2023-01-26 NOTE — TELEPHONE ENCOUNTER
----- Message from Richard Martin MA sent at 1/26/2023  8:52 AM CST -----  Regarding: Returning call  Patient's wife states someone called, but doesn't know who, wasn't able to get to the phone quick enough. Please call and advise.          Bree chavez (Spouse)   [phone] (Mobile)

## 2023-01-26 NOTE — TELEPHONE ENCOUNTER
Called [place] Medicaid ([phone]) for PA status update on Actemra and Rasuvo.  Spoke with rep Faith.     Rasuvo- denied because no paid claims from [place] Medicaid.  If pt has received from another insurance, please submit pharmacy receipts for review.   Can submit re-consideration with pharmacy receipt.    Actemra- denied because no paid claims from [place] Medicaid.  If pt has received from another insurance, please submit pharmacy receipts for review.   Can submit re-consideration with pharmacy receipt.    Faxed urgent reconsideration to Medicaid Fee for Service PA dept ([phone]) with fill history from the pharmacy.

## 2023-01-27 ENCOUNTER — TELEPHONE (OUTPATIENT)
Dept: RHEUMATOLOGY | Facility: CLINIC | Age: 53
End: 2023-01-27
Payer: MEDICAID

## 2023-01-27 NOTE — TELEPHONE ENCOUNTER
Maddie with [place] Medicaid called to update us about the rejected claims. She stated that we only have reporting status with [place] Medicaid but cannot process claims for them. They will approve the PAs for Rasuvo and Actemra, but the patient will need to fill with a [facility]. Maddie could not provide me with a list of potential pharmacies. Patient needs to be updated.

## 2023-01-27 NOTE — TELEPHONE ENCOUNTER
Returning call to [facility]  requesting to speak with someone in regards to the following meds tocilizumab (ACTEMRA ACTPEN) 162 mg/0.9 mL PnIj, and methotrexate, PF, (RASUVO, PF,) 25 mg/0.5 mL AtIn.   Please call and adv @ [phone]- Maddie.Left voicemail       Thank you,   Bernice

## 2023-01-27 NOTE — TELEPHONE ENCOUNTER
----- Message from Veeangeles Medina sent at 1/27/2023 11:46 AM CST -----  Regarding: PA  Contact: [phone]  [facility] calling to speak with someone in regards to the following meds tocilizumab (ACTEMRA ACTPEN) 162 mg/0.9 mL PnIj, and methotrexate, PF, (RASUVO, PF,) 25 mg/0.5 mL AtIn.   Please call and adv @ [phone]- Maddie

## 2023-01-27 NOTE — TELEPHONE ENCOUNTER
Actemra PA approved  1/23/23-1/23/24    Rasuvo PA approved  1/23/23-1/23/24    I see an infusion for Rituximab has been placed. Signed OV notes not available yet, but patient's wife states pt is changing from Actemra to Rituximab.     Sent staff message to Dr Mcgovern to request Rasuvo Rx be sent to [facility].    Dr. Mcgovern,    Mr. Rod has new Rx insurance ([place] Medicaid) as of 1/19/23.  I have been working on his Actemra and Rasuvo PAs, which have both been approved as of today. I see that you have placed an infusion order for Rituximab.  I am not able to see the chart notes from 1/25/23 yet, so I wanted to confirm with you-  If patient is changing from Actemra to Rituximab, please d/c Actemra Rx (or confirm this plan with me and I will d/c the Actemra Rx).      [facility] is out of network with pt's new plan.  If patient is to remain on Rasuvo, please send Rasuvo Rx to [facility] (This is a [facility] in [place] that is in network with pt's new plan).     Thank you,  Ramya Emerson, PharmD  [facility]  [phone]

## 2023-01-29 DIAGNOSIS — M06.9 RHEUMATOID ARTHRITIS, INVOLVING UNSPECIFIED SITE, UNSPECIFIED WHETHER RHEUMATOID FACTOR PRESENT: ICD-10-CM

## 2023-01-29 RX ORDER — METHOTREXATE 25 MG/.5ML
25 INJECTION, SOLUTION SUBCUTANEOUS
Qty: 4 EACH | Refills: 2 | Status: SHIPPED | OUTPATIENT
Start: 2023-01-29 | End: 2023-02-15

## 2023-01-29 NOTE — PROGRESS NOTES
"Subjective:       Patient ID: Joaquín Rod is a 52 y.o. male.    Chief Complaint: Disease Management    51 year old man with PMH Seronegative RA with positive Anti CarP, HTN, HLD, SOB presents for follow up. Patient managed on Actemra, Arava (increased to 20mg daily in June), MTX 25mg injection, Prednisone 10 mg daily. Patient was started on Actemra just after his last visit and reports that it has helped him more than any other medication he has been on for this issue. Unfortunately he continues with joint pain, swelling, and stiffness throughout his body leading to difficulties cleaning, bathing, dressing himself etc.  He also reports continued SOB and proximal weakness. Patient needs help cleaning himself in the shower and getting into and out of the shower. He struggles to walk a flight of stairs due to pain and weakness. He continues with PT/OT exercises and reports fewer falls since PT/OT treatment. Patient reports that he got the covid and flu vaccine, but did not bring records.   Review of Systems   Constitutional:  Positive for fatigue. Negative for activity change, appetite change, chills and fever.   Respiratory:  Positive for shortness of breath. Negative for apnea, cough, choking, chest tightness, wheezing and stridor.    Cardiovascular:  Positive for chest pain. Negative for palpitations and leg swelling.   Gastrointestinal:  Negative for abdominal distention, abdominal pain, constipation, diarrhea and vomiting.   Musculoskeletal:  Positive for arthralgias, gait problem and joint swelling. Negative for back pain, neck pain and neck stiffness.   Skin:  Negative for color change, pallor, rash and wound.   Neurological:  Negative for dizziness, facial asymmetry, light-headedness and headaches.       Objective:   /68   Pulse 99   Ht 5' 8" (1.727 m)   Wt 122.5 kg (270 lb 1 oz)   BMI 41.06 kg/m²      Physical Exam   Constitutional: normal appearance. No distress. He does not appear ill. "   HENT:   Head: Normocephalic and atraumatic.   Nose: Nose normal. No rhinorrhea or nasal congestion.   Mouth/Throat: Mucous membranes are moist. No oropharyngeal exudate or posterior oropharyngeal erythema. Oropharynx is clear.   Eyes: Pupils are equal, round, and reactive to light. Conjunctivae are normal. Right eye exhibits no discharge. Left eye exhibits no discharge. No scleral icterus.   Neck: Carotid bruit is not present.   Cardiovascular: Normal rate, regular rhythm, normal heart sounds and normal pulses. Exam reveals no gallop and no friction rub.   No murmur heard.  Pulmonary/Chest: Effort normal and breath sounds normal. No stridor. No respiratory distress. He has no wheezes. He has no rhonchi. He has no rales. He exhibits no tenderness.   Abdominal: Bowel sounds are normal. He exhibits no distension and no mass. There is no abdominal tenderness. There is no rebound and no guarding. No hernia.   Musculoskeletal:         General: Swelling and tenderness present. No deformity or signs of injury. Normal range of motion.      Cervical back: Normal range of motion and neck supple. No rigidity or tenderness.      Right lower leg: No edema.      Left lower leg: No edema.   Lymphadenopathy:     He has no cervical adenopathy.   Neurological: He is alert.   Skin: Skin is warm and dry.   Vitals reviewed.      6/8/2022 7/20/2022 9/28/2022 1/25/2023   Tender (JOSEPH-28) 24 / 28 13 / 28 25 / 28 26 / 28    Swollen (JOSEPH-28) 21 / 28 8 / 28 6 / 28 12 / 28    Provider Global -- 25 mm 35 mm 80 mm   Patient Global -- 50 mm 70 mm 70 mm   ESR -- 24 mm/hr 23 mm/hr 23 mm/hr   CRP -- 2.6 mg/L 1 mg/L 1 mg/L   JOSEPH-28 (ESR) -- 5.74 (High disease activity) 6.66 (High disease activity) 7 (High disease activity)   JOSEPH-28 (CRP) -- 4.93 (Moderate disease activity) 5.68 (High disease activity) 6.01 (High disease activity)   CDAI Score -- 28.5  41.5  53                Assessment:   Seronegative RF: Patient with positive CarP Ab. He has  failed TNF, Orencia, and now Actemra (though with better relief from actemra than others). Not a candidate for JAKs at this time given CVD comorbidities. On Arava, MTX, Prednisone 10 daily. Will discontinue Actemra and initiate Rituximab trial.   -Ig checked for baseline, Pre Dmards ordered including Hep B studies.  -Patient consented for Rituximab and provided ACR patient information sheet. Rituximab ordered.   -ying ordered, will also start bactrim for prophylaxis.   -Will update preDMARD labs  -Asked patient to bring in vaccine records from PCP    SOB: HRCT unremarkable. Will update PFTs   -PFTs ordered  -Advanced lung referral placed.     Strongyloides Ab positive: Patient and wife unable to conclusively say that he took previously prescribed ivermectin.   -Will re prescribe ivermectin at this time 200 mcg/kg x 2 days and repeat at 2 wks given immune suppressed status.      LE weakness: Proximal LE predominantly. Making it difficult for patient to climb stairs. PL12 positive in the past. Aldolase, CK, PL12, and MRIs negative for myositis at this time. Will continue to monitor CK/LDH/Aldolase q 3 mo. Suspect significant contribution for steroids. Encouraged patient to continue to PT exercises.   -Follow up CK  -Continue to PT/OT exercises.                Plan:       Problem List Items Addressed This Visit          Immunology/Multi System    Rheumatoid disease - Primary    Relevant Orders    Complete PFT w/ bronchodilator    Ambulatory referral/consult to Advanced Lung Disease    Prior authorization Order    Ambulatory referral/consult Order for Ying Tamez

## 2023-01-29 NOTE — PROGRESS NOTES
I have personally reviewed the history, confirmed exam findings, and discussed assessment and plan with fellow. He has continued severe synovitis of pips, mcps, wrists, and arthralgia elbows, shoulders, knees and ankles with marked morning stiffness. Has failed anti-TNF(etanercept), adalimumab, and now tocilizumab(though helped more than the others) with concurrent MTX 25mg once a week, leflunomide 20mg daily and prednisone 10mg daily    Will proceed with trial of rituximab in place of tocilizumab. Not a candidate for Meli with co-morbidities  Consider also adding sulfasalazine/hydroxychloroquine

## 2023-01-30 ENCOUNTER — PATIENT MESSAGE (OUTPATIENT)
Dept: PHARMACY | Facility: CLINIC | Age: 53
End: 2023-01-30
Payer: MEDICAID

## 2023-01-30 ENCOUNTER — TELEPHONE (OUTPATIENT)
Dept: RHEUMATOLOGY | Facility: CLINIC | Age: 53
End: 2023-01-30
Payer: MEDICAID

## 2023-01-30 ENCOUNTER — TELEPHONE (OUTPATIENT)
Dept: TRANSPLANT | Facility: CLINIC | Age: 53
End: 2023-01-30
Payer: MEDICAID

## 2023-01-30 NOTE — TELEPHONE ENCOUNTER
----- Message from Madhavi Bales sent at 1/30/2023  3:22 PM CST -----  Type:  Patient Returning Call    Who Called:pt  Who Left Message for Patient:pt  Does the patient know what this is regarding?:pt would like to specifically speak to Dr. Mcgovern regarding his treatment  Would the patient rather a call back or a response via [facility]? Call  Best Call Back Number:[phone]  Additional Information:

## 2023-01-30 NOTE — TELEPHONE ENCOUNTER
MD discontinued Actemra Rx.  Rasvuo Rx routed to [facility].    [employer] message sent to pt to provide ph # for [facility].

## 2023-02-02 ENCOUNTER — HOSPITAL ENCOUNTER (OUTPATIENT)
Dept: PULMONOLOGY | Facility: CLINIC | Age: 53
Discharge: HOME | End: 2023-02-02
Payer: MEDICAID

## 2023-02-02 ENCOUNTER — TELEPHONE (OUTPATIENT)
Dept: RHEUMATOLOGY | Facility: CLINIC | Age: 53
End: 2023-02-02
Payer: MEDICAID

## 2023-02-02 ENCOUNTER — OFFICE VISIT (OUTPATIENT)
Dept: TRANSPLANT | Facility: CLINIC | Age: 53
End: 2023-02-02
Payer: MEDICAID

## 2023-02-02 VITALS
OXYGEN SATURATION: 97 % | DIASTOLIC BLOOD PRESSURE: 79 MMHG | BODY MASS INDEX: 42.55 KG/M2 | SYSTOLIC BLOOD PRESSURE: 119 MMHG | HEIGHT: 66 IN | WEIGHT: 264.75 LBS | BODY MASS INDEX: 42.55 KG/M2 | HEIGHT: 66 IN | HEART RATE: 110 BPM | WEIGHT: 264.75 LBS

## 2023-02-02 DIAGNOSIS — M06.9 RHEUMATOID ARTHRITIS, INVOLVING UNSPECIFIED SITE, UNSPECIFIED WHETHER RHEUMATOID FACTOR PRESENT: ICD-10-CM

## 2023-02-02 DIAGNOSIS — R74.01 HIGH TRANSAMINASE LEVELS: ICD-10-CM

## 2023-02-02 DIAGNOSIS — R74.8 ABNORMAL CPK: ICD-10-CM

## 2023-02-02 DIAGNOSIS — Z79.631 METHOTREXATE, LONG TERM, CURRENT USE: ICD-10-CM

## 2023-02-02 DIAGNOSIS — E80.6 BILIRUBINEMIA: Primary | ICD-10-CM

## 2023-02-02 DIAGNOSIS — R06.02 SHORTNESS OF BREATH: ICD-10-CM

## 2023-02-02 DIAGNOSIS — R76.8 POSITIVE ANA (ANTINUCLEAR ANTIBODY): ICD-10-CM

## 2023-02-02 DIAGNOSIS — M13.0 POLYARTHRITIS: Primary | ICD-10-CM

## 2023-02-02 PROCEDURE — 99999 PR PBB SHADOW E&M-EST. PATIENT-LVL III: CPT | Mod: PBBFAC,TXP,, | Performed by: INTERNAL MEDICINE

## 2023-02-02 PROCEDURE — 94727 GAS DIL/WSHOT DETER LNG VOL: CPT | Mod: PBBFAC,NTX | Performed by: INTERNAL MEDICINE

## 2023-02-02 PROCEDURE — 94727 GAS DIL/WSHOT DETER LNG VOL: CPT | Mod: 26,S$PBB,NTX, | Performed by: INTERNAL MEDICINE

## 2023-02-02 PROCEDURE — 3008F PR BODY MASS INDEX (BMI) DOCUMENTED: ICD-10-PCS | Mod: CPTII,NTX,, | Performed by: INTERNAL MEDICINE

## 2023-02-02 PROCEDURE — 94729 DIFFUSING CAPACITY: CPT | Mod: PBBFAC,NTX | Performed by: INTERNAL MEDICINE

## 2023-02-02 PROCEDURE — 94010 BREATHING CAPACITY TEST: CPT | Mod: 26,S$PBB,NTX, | Performed by: INTERNAL MEDICINE

## 2023-02-02 PROCEDURE — 94618 PULMONARY STRESS TESTING: CPT | Mod: 26,S$PBB,NTX, | Performed by: INTERNAL MEDICINE

## 2023-02-02 PROCEDURE — 99204 PR OFFICE/OUTPT VISIT, NEW, LEVL IV, 45-59 MIN: ICD-10-PCS | Mod: 25,S$PBB,NTX, | Performed by: INTERNAL MEDICINE

## 2023-02-02 PROCEDURE — 94618 PULMONARY STRESS TESTING: CPT | Mod: PBBFAC,NTX | Performed by: INTERNAL MEDICINE

## 2023-02-02 PROCEDURE — 3074F PR MOST RECENT SYSTOLIC BLOOD PRESSURE < 130 MM HG: ICD-10-PCS | Mod: CPTII,NTX,, | Performed by: INTERNAL MEDICINE

## 2023-02-02 PROCEDURE — 99213 OFFICE O/P EST LOW 20 MIN: CPT | Mod: PBBFAC,NTX | Performed by: INTERNAL MEDICINE

## 2023-02-02 PROCEDURE — 3078F DIAST BP <80 MM HG: CPT | Mod: CPTII,NTX,, | Performed by: INTERNAL MEDICINE

## 2023-02-02 PROCEDURE — 3008F BODY MASS INDEX DOCD: CPT | Mod: CPTII,NTX,, | Performed by: INTERNAL MEDICINE

## 2023-02-02 PROCEDURE — 94729 PR C02/MEMBANE DIFFUSE CAPACITY: ICD-10-PCS | Mod: 26,S$PBB,NTX, | Performed by: INTERNAL MEDICINE

## 2023-02-02 PROCEDURE — 99204 OFFICE O/P NEW MOD 45 MIN: CPT | Mod: 25,S$PBB,NTX, | Performed by: INTERNAL MEDICINE

## 2023-02-02 PROCEDURE — 3078F PR MOST RECENT DIASTOLIC BLOOD PRESSURE < 80 MM HG: ICD-10-PCS | Mod: CPTII,NTX,, | Performed by: INTERNAL MEDICINE

## 2023-02-02 PROCEDURE — 94010 BREATHING CAPACITY TEST: ICD-10-PCS | Mod: 26,S$PBB,NTX, | Performed by: INTERNAL MEDICINE

## 2023-02-02 PROCEDURE — 94727 PR PULM FUNCTION TEST BY GAS: ICD-10-PCS | Mod: 26,S$PBB,NTX, | Performed by: INTERNAL MEDICINE

## 2023-02-02 PROCEDURE — 3074F SYST BP LT 130 MM HG: CPT | Mod: CPTII,NTX,, | Performed by: INTERNAL MEDICINE

## 2023-02-02 PROCEDURE — 94618 PULMONARY STRESS TESTING: ICD-10-PCS | Mod: 26,S$PBB,NTX, | Performed by: INTERNAL MEDICINE

## 2023-02-02 PROCEDURE — 94010 BREATHING CAPACITY TEST: CPT | Mod: PBBFAC,NTX | Performed by: INTERNAL MEDICINE

## 2023-02-02 PROCEDURE — 99999 PR PBB SHADOW E&M-EST. PATIENT-LVL III: ICD-10-PCS | Mod: PBBFAC,TXP,, | Performed by: INTERNAL MEDICINE

## 2023-02-02 PROCEDURE — 94729 DIFFUSING CAPACITY: CPT | Mod: 26,S$PBB,NTX, | Performed by: INTERNAL MEDICINE

## 2023-02-02 NOTE — TELEPHONE ENCOUNTER
Please schedule all labs ordered to day ASAP, he may still be here today in which case can get them today.   Please check on status of his ritixumab and please schedule.  Thank you MARKOS  
No

## 2023-02-02 NOTE — LETTER
February 2, 2023        Roberto Carlos Tamez  [address]  [facility] [address]  Phone: [phone]  Fax: [phone]             [facility] 1st Fl  [address]  [facility] [address]  Phone: [phone]   Patient: Joaquín Rod   MR Number: [id number]   YOB: 1970   Date of Visit: 2/2/2023       Dear Dr. Roberto Carlos Tamez    Thank you for referring Joaquín Rod to me for evaluation. Attached you will find relevant portions of my assessment and plan of care.    If you have questions, please do not hesitate to call me. I look forward to following Joaquín Rod along with you.    Sincerely,    Kobe Lemos MD    Enclosure    If you would like to receive this communication electronically, please contact [email or web address] or [phone] to request [employer] access.    [employer] is a tool which provides read-only access to select patient information with whom you have a relationship. Its easy to use and provides real time access to review your patients record including encounter summaries, notes, results, and demographic information.    If you feel you have received this communication in error or would no longer like to receive these types of communications, please e-mail [email or web address]

## 2023-02-02 NOTE — TELEPHONE ENCOUNTER
In addition to scheduling all the labs I ordered today, please schedule appt ASAP in Hepatology for abnormal LFTs. Labs can be drawn same day as Hepatology appt. Thank you MARKOS

## 2023-02-02 NOTE — PROGRESS NOTES
"[facility] INITIAL EVALUATION                                                                                                                                             Reason for Visit:  Evaluation     Referring Physician: Roberto Carlos Tamez MD    History of Present Illness: Joaquín Rod is a 52 y.o. male who is on 0L of oxygen.  He is on CPAP at nighttime only for MARTI.  His New [place] Heart Association Class is I and a Karnofsky score of 80% - Normal activity with effort: some symptoms of disease. He is not diabetic.    He has a medical history significant for seronegative RA that is anti-CarP positive. He has experienced SOB for about 2 years, such that he now becomes SOB with coming in and out of his tub and changing his clothes.     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 He is a never smoker, but has used smokeless tobacco ("dip") for most of his life. He has had Covid twice, once during the first wave (before the vaccine) in November 2020, and the second time was in February 2021. He states he noticed his problems with SOB after his second infection with Covid.     Past Medical History:   Diagnosis Date    HLD (hyperlipidemia)     HTN (hypertension)     MARTI on CPAP     Rheumatoid arthritis, unspecified        No past surgical history on file.    Allergies: Patient has no known allergies.    Current Outpatient Medications   Medication Sig    amLODIPine (NORVASC) 5 MG tablet amlodipine 5 mg tablet   TAKE 1 TABLET BY MOUTH EVERY DAY    atorvastatin (LIPITOR) 20 MG tablet atorvastatin 20 mg tablet   TAKE 1 TABLET BY MOUTH EVERY DAY    budesonide (PULMICORT) 0.5 mg/2 mL nebulizer solution 2 mLs.    budesonide 1 mg/2 mL [facility] budesonide 1 mg/2 mL suspension for nebulization   INHALE 2 ML TWICE A DAY BY NEBULIZATION ROUTE AS NEEDED.    busPIRone (BUSPAR) 10 MG tablet Take 10 mg by mouth 2 (two) times daily.    fluticasone " propionate (FLONASE) 50 mcg/actuation nasal spray fluticasone propionate 50 mcg/actuation nasal spray,suspension   SPRAY 2 SPRAYS INTO EACH NOSTRIL EVERY DAY    folic acid (FOLVITE) 1 MG tablet Take 1 tablet (1 mg total) by mouth once daily.    leflunomide (ARAVA) 20 MG Tab Take 1 tablet (20 mg total) by mouth once daily.    methotrexate, PF, (RASUVO, PF,) 25 mg/0.5 mL AtIn Inject 0.5 mLs (25 mg total) into the skin every 7 days.    predniSONE (DELTASONE) 5 MG tablet Take 2 tablets (10 mg total) by mouth once daily.    sulfamethoxazole-trimethoprim 400-80mg (BACTRIM,SEPTRA) 400-80 mg per tablet Take 1 tablet by mouth every Mon, Wed, Fri.    telmisartan-hydrochlorothiazide (MICARDIS HCT) 80-25 mg per tablet telmisartan 80 mg-hydrochlorothiazide 25 mg tablet   TAKE 1 TABLET BY MOUTH EVERY DAY    venlafaxine (EFFEXOR) 37.5 MG Tab Take 37.5 mg by mouth.     No current facility-administered medications for this visit.       Immunization History   Administered Date(s) Administered    COVID-19 Vaccine 03/22/2021, 04/09/2021    COVID-19, MRNA, LN-S, PF (Pfizer) (Purple Cap) 03/22/2021, 04/09/2021    COVID-19, mRNA, LNP-S, bivalent booster, PF (PFIZER OMICRON) 11/14/2022    Influenza 10/08/2017, 11/13/2018, 09/23/2019    Influenza - Trivalent (ADULT) 11/06/2015, 12/14/2016    Influenza - Trivalent - PF (ADULT) 11/29/2006, 10/24/2008    MMR 05/24/2013    Pneumococcal Polysaccharide - 23 Valent 10/25/2021     Family History:  No family history on file.  Social History     Substance and Sexual Activity   Alcohol Use Never      Social History     Substance and Sexual Activity   Drug Use Never      Social History     Socioeconomic History    Marital status:    Tobacco Use    Smoking status: Never     Passive exposure: Never    Smokeless tobacco: Current     Types: Chew   Substance and Sexual Activity    Alcohol use: Never    Drug use: Never     Review of Systems   Constitutional:  Negative for chills, fever and weight  "loss.   Respiratory:  Positive for shortness of breath. Negative for cough, hemoptysis, sputum production and wheezing.    Cardiovascular:  Positive for leg swelling. Negative for chest pain, orthopnea and PND.   Gastrointestinal:  Negative for abdominal pain, constipation, diarrhea, nausea and vomiting.   Musculoskeletal:  Positive for joint pain and myalgias.   Neurological: Negative.    Psychiatric/Behavioral: Negative.     Vitals  /79 (BP Location: Left arm, Patient Position: Sitting, BP Method: Large (Automatic))   Pulse 110   Ht 5' 6" (1.676 m)   Wt 120.1 kg (264 lb 12.4 oz)   SpO2 97% Comment: room air  BMI 42.74 kg/m²   Physical Exam  Constitutional:       General: He is not in acute distress.     Appearance: Normal appearance.   HENT:      Head: Normocephalic and atraumatic.   Eyes:      Extraocular Movements: Extraocular movements intact.   Cardiovascular:      Rate and Rhythm: Normal rate and regular rhythm.      Heart sounds: Normal heart sounds. No murmur heard.    No friction rub. No gallop.   Pulmonary:      Effort: Pulmonary effort is normal. No respiratory distress.      Breath sounds: Normal breath sounds. No wheezing or rales.   Abdominal:      Palpations: Abdomen is soft.      Tenderness: There is no abdominal tenderness.   Musculoskeletal:         General: Swelling (2+ pitting edema BLE bilaterally) present. Normal range of motion.      Cervical back: Normal range of motion and neck supple.   Skin:     General: Skin is warm and dry.   Neurological:      General: No focal deficit present.      Mental Status: He is alert and oriented to person, place, and time.      Cranial Nerves: No cranial nerve deficit.   Psychiatric:         Mood and Affect: Mood normal.         Judgment: Judgment normal.       Labs:  Lab Visit on 02/02/2023   Component Date Value    Sed Rate 02/02/2023 <2     CRP 02/02/2023 <0.3     WBC 02/02/2023 5.02     RBC 02/02/2023 5.16     Hemoglobin 02/02/2023 16.5     " Hematocrit 02/02/2023 49.4     MCV 02/02/2023 96     MCH 02/02/2023 32.0 (H)     MCHC 02/02/2023 33.4     RDW 02/02/2023 14.0     Platelets 02/02/2023 164     MPV 02/02/2023 12.1     Immature Granulocytes 02/02/2023 0.2     Gran # (ANC) 02/02/2023 3.2     Immature Grans (Abs) 02/02/2023 0.01     Lymph # 02/02/2023 0.6 (L)     Mono # 02/02/2023 0.5     Eos # 02/02/2023 0.7 (H)     Baso # 02/02/2023 0.06     nRBC 02/02/2023 0     Gran % 02/02/2023 63.3     Lymph % 02/02/2023 11.8 (L)     Mono % 02/02/2023 9.6     Eosinophil % 02/02/2023 13.9 (H)     Basophil % 02/02/2023 1.2     Differential Method 02/02/2023 Automated     Sodium 02/02/2023 138     Potassium 02/02/2023 4.1     Chloride 02/02/2023 103     CO2 02/02/2023 25     Glucose 02/02/2023 111 (H)     BUN 02/02/2023 13     Creatinine 02/02/2023 0.9     Calcium 02/02/2023 9.9     Total Protein 02/02/2023 7.0     Albumin 02/02/2023 4.3     Total Bilirubin 02/02/2023 2.0 (H)     Alkaline Phosphatase 02/02/2023 70     AST 02/02/2023 36     ALT 02/02/2023 61 (H)     Anion Gap 02/02/2023 10     eGFR 02/02/2023 >60.0     CPK 02/02/2023 282 (H)     LD 02/02/2023 336 (H)     Cholesterol 02/02/2023 198     Triglycerides 02/02/2023 234 (H)     HDL 02/02/2023 52     LDL Cholesterol 02/02/2023 99.2     HDL/Cholesterol Ratio 02/02/2023 26.3     Total Cholesterol/HDL Ra* 02/02/2023 3.8     Non-HDL Cholesterol 02/02/2023 146        Pulmonary Function Tests 2/2/2023   FVC 3.19   FEV1 2.73   TLC (liters) 4.5   DLCO (ml/mmHg sec) 20.54   FVC% 75.4   FEV1% 81.3   FEF 25-75 3.66   FEF 25-75% 119.2   TLC% 71.3   RV 0.9   RV% 42.2   DLCO% 75.6     6MW 2/2/2023 8/17/2022   6MWT Status completed without stopping completed without stopping   Patient Reported Dyspnea;Leg pain Dyspnea   Was O2 used? No No   6MW Distance walked (feet) 732 800   Distance walked (meters) 223.11 243.84   Did patient stop? No No   Oxygen Saturation 97 96   Supplemental Oxygen Room Air Room Air   Heart Rate 100  94   Blood Pressure 129/74 122/70   Hu Dyspnea Rating  very light moderate   Oxygen Saturation 97 96   Supplemental Oxygen Room Air Room Air   Heart Rate 131 116   Blood Pressure 130/77 119/72   Hu Dyspnea Rating  heavy heavy   Recovery Time (seconds) 142 72   Oxygen Saturation 98 96   Supplemental Oxygen Room Air Room Air   Heart Rate 110 104       Imaging:  Results for orders placed during the hospital encounter of 03/12/21    X-Ray Chest PA And Lateral    Narrative  EXAMINATION:  XR CHEST PA AND LATERAL    CLINICAL HISTORY:  Rheumatoid arthritis, unspecified    TECHNIQUE:  PA and lateral views of the chest were performed.    COMPARISON:  None    FINDINGS:  Cardiac size is normal.  Lungs are clear and no infiltrate is seen.    Impression  See above      Electronically signed by: Joaquín Caldera MD  Date:    03/12/2021  Time:    11:18    CT Chest Without Contrast 10/07/2022    Narrative  EXAMINATION:  CT CHEST WITHOUT CONTRAST    CLINICAL HISTORY:  Interstitial lung disease;ild; Interstitial pulmonary disease, unspecified    TECHNIQUE:  Low dose axial images, sagittal and coronal reformations were obtained from the thoracic inlet to the lung bases. Contrast was not administered.  Inspiratory, expiratory, and prone images were obtained.    COMPARISON:  CT chest 04/14/2021    FINDINGS:  Lungs/Pleura: Clear lungs.  No focal opacity or mass.  No pleural effusion or thickening.  Expiratory views demonstrate no evidence of air trapping.  The prone views are noncontributory.    Base of Neck: Unremarkable.    Thoracic soft tissues: Within normal limits.    Esophagus: Normal.    Airways: Patent.    Aorta: Left-sided aortic arch.  No aneurysm and no significant atherosclerosis.    Heart: Normal size. No effusion.    Pulmonary vasculature: Unremarkable.    Mercedez/Mediastinum: No pathologic amanda enlargement.    Upper Abdomen: There is a 6 cm hypodense hepatic lesion with adjacent smaller hypodensities, similar to prior,  likely hepatic cysts.    Bones: Within normal limits for age.    Impression  1.  No CT evidence for interstitial pneumonia.  2. circumscribed hypodense lesion within the liver, stable in consistent with hepatic cysts versus hemangiomas.    Electronically signed by resident: Ingrid Hernandez  Date:    10/07/2022  Time:    13:50    Electronically signed by: Esli Segal  Date:    10/07/2022  Time:    17:15     Cardiodiagnostics:  Results for orders placed during the hospital encounter of 09/28/22    Echo    Interpretation Summary  · The left ventricle is normal in size with normal systolic function.  · The estimated ejection fraction is 65%.  · Normal left ventricular diastolic function.  · Normal right ventricular size with normal right ventricular systolic function.        Assessment:  1. Shortness of breath    2. Rheumatoid arthritis, involving unspecified site, unspecified whether rheumatoid factor present      Plan:   HRCT of chest does not demonstrate overt signs of ILD, though there is some suggestion of reticulation in the bases. Moreover, there has been a demonstrable decrease in FVC and TLC. This is clinically correlated with the symptom of SOB and significant ESCOBAR.   Will confer with rheumatology re: diagnosis of RA. After conferring, will determine need for transbronchial biopsy to further investigate etiology of developing ILD     Return to clinic in 3 months.    Discussed with Dr. Aminta Vasquez [facility] Fellow   02/02/2023

## 2023-02-02 NOTE — PROCEDURES
Joaquín Rod is a 52 y.o.  male patient, who presents for a 6 minute walk test ordered by DO Mone.  The diagnosis is Rheumatoid Arthritis; Shortness of Breath.  The patient's BMI is 42.8 kg/m2.  Predicted distance (lower limit of normal) is 379.07 meters.      Test Results:    The test was completed without stopping.  The total time walked was 360 seconds.  During walking, the patient reported:  Dyspnea, Leg pain.  The patient used a cane for assistance during testing.     02/02/2023---------Distance: 223.11 meters (732 feet)     Time O2 Sat % Supplemental Oxygen Heart Rate Blood Pressure Hu Scale   Pre-exercise  (Resting) 0 97 % Room Air 100 bpm 129/74 mmHg 1   During Exercise 1 min 95 % Room Air 127 bpm     During Exercise 2 min 96 % Room Air 126 bpm     During Exercise 3 min 97 % Room Air 128 bpm     During Exercise 4 min 96 % Room Air 131 bpm     During Exercise 5 min 95 % Room Air 132 bpm     During Exercise 6 min 97 % Room Air 131 bpm 130/77 mmHg 5-6   Post-exercise  (Recovery)  98 % Room Air  110 bpm       Recovery Time: 142 seconds    Performing nurse/tech: Jacques ESPARZA      PREVIOUS STUDY:   08/17/2022---------Distance: 243.84 meters (800 feet)       O2 Sat % Supplemental Oxygen Heart Rate Blood Pressure Hu Scale   Pre-exercise  (Resting) 96 % Room Air 94 bpm 122/70 mmHg 3   During Exercise 96 % Room Air 116 bpm 119/72 mmHg 5-6   Post-exercise  (Recovery) 96 % Room Air  104 bpm           CLINICAL INTERPRETATION:  Six minute walk distance is 223.11 meters (732 feet) with heavy dyspnea.  During exercise, there was no significant desaturation while breathing room air.  Blood pressure remained stable and Heart rate increased significantly with walking.  Tachycardia was present prior to exercise.  This may represent a tachycardic response to exercise.  The patient reported non-pulmonary symptoms during exercise.  Significant exercise impairment is likely due to cardiovascular causes and  subjective symptoms.  The patient did complete the study, walking 360 seconds of the 360 second test.  Since the previous study in August 2022, exercise capacity is unchanged.  Based upon age and body mass index, exercise capacity is less than predicted.

## 2023-02-03 ENCOUNTER — PATIENT MESSAGE (OUTPATIENT)
Dept: RHEUMATOLOGY | Facility: CLINIC | Age: 53
End: 2023-02-03
Payer: MEDICAID

## 2023-02-06 DIAGNOSIS — R71.8 ERYTHROCYTE ABNORMALITY: Primary | ICD-10-CM

## 2023-02-06 DIAGNOSIS — M06.9 RHEUMATOID ARTHRITIS, INVOLVING UNSPECIFIED SITE, UNSPECIFIED WHETHER RHEUMATOID FACTOR PRESENT: Primary | ICD-10-CM

## 2023-02-15 ENCOUNTER — TELEPHONE (OUTPATIENT)
Dept: RHEUMATOLOGY | Facility: CLINIC | Age: 53
End: 2023-02-15
Payer: MEDICAID

## 2023-02-15 ENCOUNTER — LAB VISIT (OUTPATIENT)
Dept: LAB | Facility: HOSPITAL | Age: 53
End: 2023-02-15
Attending: INTERNAL MEDICINE
Payer: MEDICAID

## 2023-02-15 ENCOUNTER — OFFICE VISIT (OUTPATIENT)
Dept: HEPATOLOGY | Facility: CLINIC | Age: 53
End: 2023-02-15
Payer: MEDICAID

## 2023-02-15 ENCOUNTER — PROCEDURE VISIT (OUTPATIENT)
Dept: HEPATOLOGY | Facility: CLINIC | Age: 53
End: 2023-02-15
Payer: MEDICAID

## 2023-02-15 VITALS — BODY MASS INDEX: 41.34 KG/M2 | WEIGHT: 257.25 LBS | HEIGHT: 66 IN

## 2023-02-15 DIAGNOSIS — E66.01 MORBID OBESITY: ICD-10-CM

## 2023-02-15 DIAGNOSIS — M06.9 RHEUMATOID ARTHRITIS, INVOLVING UNSPECIFIED SITE, UNSPECIFIED WHETHER RHEUMATOID FACTOR PRESENT: ICD-10-CM

## 2023-02-15 DIAGNOSIS — R74.01 HIGH TRANSAMINASE LEVELS: ICD-10-CM

## 2023-02-15 DIAGNOSIS — F41.8 ANTICIPATORY ANXIETY: ICD-10-CM

## 2023-02-15 DIAGNOSIS — E78.5 DYSLIPIDEMIA: ICD-10-CM

## 2023-02-15 DIAGNOSIS — E80.6 BILIRUBINEMIA: Primary | ICD-10-CM

## 2023-02-15 DIAGNOSIS — I10 ESSENTIAL HYPERTENSION: ICD-10-CM

## 2023-02-15 DIAGNOSIS — Z79.631 METHOTREXATE, LONG TERM, CURRENT USE: ICD-10-CM

## 2023-02-15 DIAGNOSIS — R74.8 ABNORMAL CPK: ICD-10-CM

## 2023-02-15 DIAGNOSIS — M60.9 MYOSITIS, UNSPECIFIED MYOSITIS TYPE, UNSPECIFIED SITE: ICD-10-CM

## 2023-02-15 DIAGNOSIS — R76.8 POSITIVE ANA (ANTINUCLEAR ANTIBODY): ICD-10-CM

## 2023-02-15 DIAGNOSIS — M13.0 POLYARTHRITIS: ICD-10-CM

## 2023-02-15 PROBLEM — M06.4 UNDIFFERENTIATED INFLAMMATORY POLYARTHRITIS: Status: ACTIVE | Noted: 2021-06-30

## 2023-02-15 PROBLEM — G47.33 OBSTRUCTIVE SLEEP APNEA SYNDROME: Status: ACTIVE | Noted: 2020-06-15

## 2023-02-15 PROBLEM — R73.01 IMPAIRED FASTING GLUCOSE: Status: ACTIVE | Noted: 2020-01-06

## 2023-02-15 PROBLEM — U07.1 COVID-19: Status: ACTIVE | Noted: 2022-01-22

## 2023-02-15 LAB
BILIRUB UR QL STRIP: NEGATIVE
CLARITY UR REFRACT.AUTO: CLEAR
COLOR UR AUTO: COLORLESS
CREAT UR-MCNC: 29 MG/DL (ref 23–375)
GLUCOSE UR QL STRIP: NEGATIVE
HGB UR QL STRIP: NEGATIVE
KETONES UR QL STRIP: NEGATIVE
LEUKOCYTE ESTERASE UR QL STRIP: NEGATIVE
NITRITE UR QL STRIP: NEGATIVE
PH UR STRIP: 7 [PH] (ref 5–8)
PROT UR QL STRIP: NEGATIVE
PROT UR-MCNC: <7 MG/DL (ref 0–15)
PROT/CREAT UR: NORMAL MG/G{CREAT} (ref 0–0.2)
SP GR UR STRIP: 1 (ref 1–1.03)
URN SPEC COLLECT METH UR: ABNORMAL

## 2023-02-15 PROCEDURE — 3008F PR BODY MASS INDEX (BMI) DOCUMENTED: ICD-10-PCS | Mod: CPTII,NTX,, | Performed by: NURSE PRACTITIONER

## 2023-02-15 PROCEDURE — 91200 LIVER ELASTOGRAPHY: CPT | Mod: PBBFAC,NTX | Performed by: NURSE PRACTITIONER

## 2023-02-15 PROCEDURE — 81003 URINALYSIS AUTO W/O SCOPE: CPT | Mod: TXP | Performed by: INTERNAL MEDICINE

## 2023-02-15 PROCEDURE — 99204 OFFICE O/P NEW MOD 45 MIN: CPT | Mod: S$PBB,NTX,, | Performed by: NURSE PRACTITIONER

## 2023-02-15 PROCEDURE — 99999 PR PBB SHADOW E&M-EST. PATIENT-LVL IV: CPT | Mod: PBBFAC,TXP,, | Performed by: NURSE PRACTITIONER

## 2023-02-15 PROCEDURE — 1159F MED LIST DOCD IN RCRD: CPT | Mod: CPTII,NTX,, | Performed by: NURSE PRACTITIONER

## 2023-02-15 PROCEDURE — 3008F BODY MASS INDEX DOCD: CPT | Mod: CPTII,NTX,, | Performed by: NURSE PRACTITIONER

## 2023-02-15 PROCEDURE — 1160F RVW MEDS BY RX/DR IN RCRD: CPT | Mod: CPTII,NTX,, | Performed by: NURSE PRACTITIONER

## 2023-02-15 PROCEDURE — 82570 ASSAY OF URINE CREATININE: CPT | Mod: TXP | Performed by: INTERNAL MEDICINE

## 2023-02-15 PROCEDURE — 99214 OFFICE O/P EST MOD 30 MIN: CPT | Mod: PBBFAC,NTX | Performed by: NURSE PRACTITIONER

## 2023-02-15 PROCEDURE — 76981 FIBROSCAN NEW ORLEANS: ICD-10-PCS | Mod: 26,S$PBB,NTX, | Performed by: NURSE PRACTITIONER

## 2023-02-15 PROCEDURE — 76981 USE PARENCHYMA: CPT | Mod: 26,S$PBB,NTX, | Performed by: NURSE PRACTITIONER

## 2023-02-15 PROCEDURE — 99204 PR OFFICE/OUTPT VISIT, NEW, LEVL IV, 45-59 MIN: ICD-10-PCS | Mod: S$PBB,NTX,, | Performed by: NURSE PRACTITIONER

## 2023-02-15 PROCEDURE — 99999 PR PBB SHADOW E&M-EST. PATIENT-LVL IV: ICD-10-PCS | Mod: PBBFAC,TXP,, | Performed by: NURSE PRACTITIONER

## 2023-02-15 PROCEDURE — 1160F PR REVIEW ALL MEDS BY PRESCRIBER/CLIN PHARMACIST DOCUMENTED: ICD-10-PCS | Mod: CPTII,NTX,, | Performed by: NURSE PRACTITIONER

## 2023-02-15 PROCEDURE — 1159F PR MEDICATION LIST DOCUMENTED IN MEDICAL RECORD: ICD-10-PCS | Mod: CPTII,NTX,, | Performed by: NURSE PRACTITIONER

## 2023-02-15 RX ORDER — DIAZEPAM 5 MG/1
5 TABLET ORAL EVERY 6 HOURS PRN
Qty: 5 TABLET | Refills: 0 | Status: SHIPPED | OUTPATIENT
Start: 2023-02-15 | End: 2024-02-14

## 2023-02-15 RX ORDER — AMLODIPINE BESYLATE 10 MG/1
TABLET ORAL
COMMUNITY

## 2023-02-15 NOTE — TELEPHONE ENCOUNTER
Bernice, please find out why the labs I ordered 2/2/23 have yet to be drawn. They need to be done this week. Thank you MARKOS

## 2023-02-15 NOTE — PATIENT INSTRUCTIONS
1. Fibroscan today to assess for fat and scar tissue in the liver  2. Schedule Ultrasound of the liver now.  3. Repeat liver function tests in 1 week.   4. Avoid alcohol and herbal supplements/alternative remedies.  5. Recommend prophylactic vaccination for Hepatitis A and B.  6. Return to clinic in 1 year, sooner if needed.    There is no FDA approved therapy for non-alcoholic fatty liver disease. Therefore, these things are important:  1. Weight loss goal of 25 lbs.  2. Low carb/sugar, high fiber and protein diet.Try to limit your carb intake to LESS than 30-45 grams of carbs with a meal or LESS than 5-10 grams with any snack (total of any snack foods eaten during that time). Use MyFitness Pal singh to add up your carbs through the day. Do NOT drink any beverages with calories or carbs (this can lead to high blood sugar and weight gain). Also, some of our patients have been very successful with weight loss using the pre made/planned meal planning services that limit calories and portion size (one example is Sensible Meals but there are many out there).  3. Exercise, 5 days per week, 30 minutes per day, as tolerated.  4. Recommend good control of cholesterol, blood pressure, & blood sugar levels.    In some people, fatty liver can progress to steatohepatitis (inflamatory fatty liver) and possibly to cirrhosis, putting one at increased risk for liver cancer, liver failure, and death. Therefore, the lifestyle changes are very important to decrease this risk.     Website with information about fatty liver and inflammation related to fatty liver (CAZARES) = www.nashtruth.com  AND www.NASHactually.com

## 2023-02-15 NOTE — PROGRESS NOTES
[facility] New Patient Visit    Reason for Visit: Elevated LFT's    PCP: Rosaline Gamboa    HPI:  This is a 52 y.o. male with PMH noted below, here for evaluation of elevated LFT's, referred by Rheumatology.     The patient's risk factors for NAFLD/CAZARES include:     Obesity                                        Yes; BMI 41.53  Dyslipidemia                                Yes; On Atorvastatin 20 mg daily.  Insulin resistance/Diabetes         No    He has a history of RA, previously treated with MTX for 2 years. He is currently on Arava and Prednisone 10 mg daily. He is scheduled for 1st Rituxan infusion on 2/24, and is anxious about the infusion. He is requesting RX for Benzodiazepine, to be taken prior to the infusion (RX given for Diazepam 5 mg, 5 tablets, no refills). He has had elevated LFT's since at least 2/2021. Most recent LFT's in 2/2023 showed a T. Bili of 2.0 , Albumin of 4.3, ALT of 61, AST of 36, ALP of 70. T. Bili has not been fractionated, but is likely elevated secondary to Gilbert Syndrome.  He has not undergone any recent abdominal imaging. He has no known family history of liver disease. He denies any history of heavy alcohol use. He does not use illicit drugs. Viral hepatitis testing was negative. He is not immune to Hepatitis A or B. Fibroscan today to stage liver disease is suggestive of severe fatty infiltration of the liver (S3), with F2-F3 (moderate to advanced) fibrosis and a low likelihood of cirrhosis. He has lost 13 pounds over the past month, with dietary changes. He is well appearing, and has no signs or symptoms of hepatic decompensation including jaundice, dark urine, pruritus, abdominal distention, lower extremity edema, hematemesis, melena, or periods of confusion suggestive of hepatic encephalopathy.      PMHX:  has a past medical history of HLD (hyperlipidemia), HTN (hypertension), MARTI on CPAP, and Rheumatoid arthritis, unspecified.    PSHX:  has no past surgical  history on file.    The patient's social and family histories were reviewed by me and updated in the appropriate section of the electronic medical record.    Review of patient's allergies indicates:  No Known Allergies    Current Outpatient Medications on File Prior to Visit   Medication Sig Dispense Refill    amLODIPine (NORVASC) 10 MG tablet amlodipine 10 mg tablet   TAKE 1 TABLET BY MOUTH EVERY DAY      atorvastatin (LIPITOR) 20 MG tablet atorvastatin 20 mg tablet   TAKE 1 TABLET BY MOUTH EVERY DAY      budesonide (PULMICORT) 0.5 mg/2 mL nebulizer solution 2 mLs.      budesonide 1 mg/2 mL [facility] budesonide 1 mg/2 mL suspension for nebulization   INHALE 2 ML TWICE A DAY BY NEBULIZATION ROUTE AS NEEDED.      busPIRone (BUSPAR) 10 MG tablet Take 10 mg by mouth 2 (two) times daily.      fluticasone propionate (FLONASE) 50 mcg/actuation nasal spray fluticasone propionate 50 mcg/actuation nasal spray,suspension   SPRAY 2 SPRAYS INTO EACH NOSTRIL EVERY DAY      folic acid (FOLVITE) 1 MG tablet Take 1 tablet (1 mg total) by mouth once daily. 90 tablet 3    leflunomide (ARAVA) 20 MG Tab Take 1 tablet (20 mg total) by mouth once daily. 90 tablet 0    predniSONE (DELTASONE) 5 MG tablet Take 2 tablets (10 mg total) by mouth once daily. 60 tablet 1    sulfamethoxazole-trimethoprim 400-80mg (BACTRIM,SEPTRA) 400-80 mg per tablet Take 1 tablet by mouth every Mon, Wed, Fri. 60 tablet 3    telmisartan-hydrochlorothiazide (MICARDIS HCT) 80-25 mg per tablet telmisartan 80 mg-hydrochlorothiazide 25 mg tablet   TAKE 1 TABLET BY MOUTH EVERY DAY      venlafaxine (EFFEXOR) 37.5 MG Tab Take 37.5 mg by mouth.      [DISCONTINUED] amLODIPine (NORVASC) 5 MG tablet amlodipine 5 mg tablet   TAKE 1 TABLET BY MOUTH EVERY DAY      [DISCONTINUED] methotrexate, PF, (RASUVO, PF,) 25 mg/0.5 mL AtIn Inject 0.5 mLs (25 mg total) into the skin every 7 days. 4 each 2    [DISCONTINUED] tocilizumab (ACTEMRA SUBQ) Actemra   SC q week       No current  "facility-administered medications on file prior to visit.       SOCIAL HISTORY:   Social History     Tobacco Use   Smoking Status Never    Passive exposure: Never   Smokeless Tobacco Current    Types: Chew     Social History     Substance and Sexual Activity   Alcohol Use Never     Social History     Substance and Sexual Activity   Drug Use Never     ROS:   GENERAL: Denies fever, chills, weight loss/gain, fatigue  HEENT: Denies headaches, dizziness, vision/hearing changes  CARDIOVASCULAR: Denies chest pain, palpitations, or edema  RESPIRATORY: Denies dyspnea, cough  GI: Denies abdominal pain, rectal bleeding, nausea, vomiting. No change in bowel pattern or color  : Denies dysuria, hematuria   SKIN: Denies rash, itching   NEURO: Denies confusion, memory loss, or mood changes  PSYCH: Denies depression or anxiety  HEME/LYMPH: Denies easy bruising or bleeding    Objective Findings:    PHYSICAL EXAM:   Friendly White male, in no acute distress; alert and oriented to person, place and time.  VITALS: Ht 5' 6" (1.676 m)   Wt 116.7 kg (257 lb 4.4 oz)   BMI 41.53 kg/m²   HENT: Normocephalic, without obvious abnormality.   EYES: Sclerae anicteric.   NECK: No obvious masses.  CARDIOVASCULAR: No peripheral edema.  RESPIRATORY: Normal respiratory effort.   GI: Soft, non-tender, obese abdomen.  EXTREMITIES:  No clubbing, cyanosis or edema.  SKIN: Warm and dry. No jaundice. No rashes noted to exposed skin.  NEURO: No asterixis. Ambulates with cane.  PSYCH:  Memory intact. Thought and speech pattern appropriate. Behavior normal. No depression or anxiety noted.    DIAGNOSTIC STUDIES:    ABD. U/S - Ordered at visit.    FIBROSCAN 2/15/2023:    Findings  Median liver stiffness score:  10.1  CAP Reading: dB/m:  380     IQR/med %:  13  Interpretation  Fibrosis interpretation is based on medial liver stiffness - Kilopascal (kPa).     Fibrosis Stage:  F3  Steatosis interpretation is based on controlled attenuation parameter - (dB/m).   "   Steatosis Grade:  S3  Comments/Plan:  F2-F3 fibrosis    EDUCATION:  Per AVS.    ASSESSMENT & PLAN:  52 y.o. White male with:    1. Bilirubinemia  Ambulatory referral/consult to Hepatology    FibroScan [place] (Vibration Controlled Transient Elastography)    US Abdomen Complete    FibroScan [place] (Vibration Controlled Transient Elastography)    Hepatic Function Panel    Hepatic Function Panel    CANCELED: Hepatic Function Panel    CANCELED: Hepatic Function Panel      2. High transaminase levels  Ambulatory referral/consult to Hepatology    FibroScan [place] (Vibration Controlled Transient Elastography)    US Abdomen Complete    FibroScan [place] (Vibration Controlled Transient Elastography)    Hepatic Function Panel    Hepatic Function Panel    CANCELED: Hepatic Function Panel    CANCELED: Hepatic Function Panel      3. Rheumatoid arthritis, involving unspecified site, unspecified whether rheumatoid factor present  US Abdomen Complete    FibroScan [place] (Vibration Controlled Transient Elastography)    Hepatic Function Panel    Hepatic Function Panel      4. Myositis, unspecified myositis type, unspecified site  US Abdomen Complete    FibroScan [place] (Vibration Controlled Transient Elastography)    Hepatic Function Panel    Hepatic Function Panel      5. Methotrexate, long term, current use  Ambulatory referral/consult to Hepatology    FibroScan [place] (Vibration Controlled Transient Elastography)    US Abdomen Complete    FibroScan [place] (Vibration Controlled Transient Elastography)    Hepatic Function Panel    Hepatic Function Panel    CANCELED: Hepatic Function Panel    CANCELED: Hepatic Function Panel      6. Dyslipidemia  US Abdomen Complete    FibroScan [place] (Vibration Controlled Transient Elastography)    Hepatic Function Panel    Hepatic Function Panel      7. Essential hypertension  US Abdomen Complete    FibroScan [place] (Vibration Controlled Transient  Elastography)    Hepatic Function Panel    Hepatic Function Panel      8. Morbid obesity  US Abdomen Complete    FibroScan [place] (Vibration Controlled Transient Elastography)    Hepatic Function Panel    Hepatic Function Panel      9. Anticipatory anxiety  diazePAM (VALIUM) 5 MG tablet        - Fibroscan today to stage liver disease.  - Schedule Ultrasound of the liver now.  - Repeat liver function tests in 1 week.   - Recommend prophylactic vaccination for Hepatitis A and B.  - Avoid alcohol and herbal supplements/alternative remedies.  - Recommend weight loss of 25 lbs, through diet and exercise.  - Recommend good control of cholesterol, blood pressure, & blood sugar levels    Follow up in about 1 year (around 2/15/2024). with Fibroscan and labs before visit.    Thank you for allowing me to participate in the care of Joaquín Rod       Hepatology Nurse Practitioner  [facility]    CC'ed note to:   Jeremías Mcgovern MD Melanie M Lindsey, MD

## 2023-02-15 NOTE — PROCEDURES
FibroScan [place]    Date/Time: 2/15/2023 2:15 PM  Performed by: Brittany Schofield NP  Authorized by: Brittany Schofield NP     Diagnosis:  NAFLD    Probe:  XL    Universal Protocol: Patient's identity, procedure and site were verified, confirmatory pause was performed.  Discussed procedure including risks and potential complications.  Questions answered.  Patient verbalizes understanding and wishes to proceed with VCTE.     Procedure: After providing explanations of the procedure, patient was placed in the supine position with right arm in maximum abduction to allow optimal exposure of right lateral abdomen.  Patient was briefly assessed, Testing was performed in the mid-axillary location, 50Hz Shear Wave pulses were applied and the resulting Shear Wave and Propagation Speed detected with a 3.5 MHz ultrasonic signal, using the FibroScan probe, Skin to liver capsule distance and liver parenchyma were accessed during the entire examination with the FibroScan probe, Patient was instructed to breathe normally and to abstain from sudden movements during the procedure, allowing for random measurements of liver stiffness. At least 10 Shear Waves were produced, Individual measurements of each Shear Wave were calculated.  Patient tolerated the procedure well with no complications.  Meets discharge criteria as was dismissed.  Rates pain 0 out of 10.  Patient will follow up with ordering provider to review results.    Findings  Median liver stiffness score:  10.1  CAP Reading: dB/m:  380    IQR/med %:  13  Interpretation  Fibrosis interpretation is based on medial liver stiffness - Kilopascal (kPa).    Fibrosis Stage:  F3  Steatosis interpretation is based on controlled attenuation parameter - (dB/m).    Steatosis Grade:  S3  Comments/Plan:  F2-F3 fibrosis

## 2023-02-17 ENCOUNTER — PATIENT MESSAGE (OUTPATIENT)
Dept: RHEUMATOLOGY | Facility: CLINIC | Age: 53
End: 2023-02-17
Payer: MEDICAID

## 2023-02-21 ENCOUNTER — PATIENT MESSAGE (OUTPATIENT)
Dept: HEPATOLOGY | Facility: CLINIC | Age: 53
End: 2023-02-21
Payer: MEDICAID

## 2023-02-24 ENCOUNTER — INFUSION (OUTPATIENT)
Dept: INFUSION THERAPY | Facility: HOSPITAL | Age: 53
End: 2023-02-24
Payer: MEDICAID

## 2023-02-24 VITALS
OXYGEN SATURATION: 99 % | TEMPERATURE: 98 F | HEIGHT: 66 IN | RESPIRATION RATE: 18 BRPM | WEIGHT: 257.25 LBS | DIASTOLIC BLOOD PRESSURE: 69 MMHG | HEART RATE: 85 BPM | SYSTOLIC BLOOD PRESSURE: 132 MMHG | BODY MASS INDEX: 41.34 KG/M2

## 2023-02-24 DIAGNOSIS — M06.9 RHEUMATOID ARTHRITIS, INVOLVING UNSPECIFIED SITE, UNSPECIFIED WHETHER RHEUMATOID FACTOR PRESENT: Primary | ICD-10-CM

## 2023-02-24 PROCEDURE — 25000003 PHARM REV CODE 250: Performed by: INTERNAL MEDICINE

## 2023-02-24 PROCEDURE — 96367 TX/PROPH/DG ADDL SEQ IV INF: CPT

## 2023-02-24 PROCEDURE — 96413 CHEMO IV INFUSION 1 HR: CPT

## 2023-02-24 PROCEDURE — 96375 TX/PRO/DX INJ NEW DRUG ADDON: CPT

## 2023-02-24 PROCEDURE — 63600175 PHARM REV CODE 636 W HCPCS: Mod: JG,UD | Performed by: INTERNAL MEDICINE

## 2023-02-24 PROCEDURE — 96415 CHEMO IV INFUSION ADDL HR: CPT

## 2023-02-24 RX ORDER — HEPARIN 100 UNIT/ML
500 SYRINGE INTRAVENOUS
Status: DISCONTINUED | OUTPATIENT
Start: 2023-02-24 | End: 2023-02-24 | Stop reason: HOSPADM

## 2023-02-24 RX ORDER — ACETAMINOPHEN 325 MG/1
650 TABLET ORAL
Status: CANCELLED | OUTPATIENT
Start: 2023-03-10

## 2023-02-24 RX ORDER — ACETAMINOPHEN 325 MG/1
650 TABLET ORAL
Status: COMPLETED | OUTPATIENT
Start: 2023-02-24 | End: 2023-02-24

## 2023-02-24 RX ORDER — SODIUM CHLORIDE 0.9 % (FLUSH) 0.9 %
10 SYRINGE (ML) INJECTION
Status: DISCONTINUED | OUTPATIENT
Start: 2023-02-24 | End: 2023-02-24 | Stop reason: HOSPADM

## 2023-02-24 RX ORDER — SODIUM CHLORIDE 0.9 % (FLUSH) 0.9 %
10 SYRINGE (ML) INJECTION
Status: CANCELLED | OUTPATIENT
Start: 2023-03-10

## 2023-02-24 RX ORDER — HEPARIN 100 UNIT/ML
500 SYRINGE INTRAVENOUS
Status: CANCELLED | OUTPATIENT
Start: 2023-03-10

## 2023-02-24 RX ORDER — FAMOTIDINE 10 MG/ML
20 INJECTION INTRAVENOUS
Status: CANCELLED | OUTPATIENT
Start: 2023-03-10

## 2023-02-24 RX ORDER — FAMOTIDINE 10 MG/ML
20 INJECTION INTRAVENOUS
Status: COMPLETED | OUTPATIENT
Start: 2023-02-24 | End: 2023-02-24

## 2023-02-24 RX ADMIN — SODIUM CHLORIDE: 0.9 INJECTION, SOLUTION INTRAVENOUS at 08:02

## 2023-02-24 RX ADMIN — ACETAMINOPHEN 650 MG: 325 TABLET ORAL at 08:02

## 2023-02-24 RX ADMIN — FAMOTIDINE 20 MG: 10 INJECTION INTRAVENOUS at 09:02

## 2023-02-24 RX ADMIN — DEXTROSE 100 MG: 50 INJECTION, SOLUTION INTRAVENOUS at 08:02

## 2023-02-24 RX ADMIN — DIPHENHYDRAMINE HYDROCHLORIDE 25 MG: 50 INJECTION, SOLUTION INTRAMUSCULAR; INTRAVENOUS at 09:02

## 2023-02-24 RX ADMIN — RITUXIMAB 1000 MG: 10 INJECTION, SOLUTION INTRAVENOUS at 09:02

## 2023-02-24 NOTE — PLAN OF CARE
Pt received Rituxan Day 1 today and tolerated well, without complications. VSS throughout infusion. Educated patient about Rituxan Day 1  (indications, side effects, possible reactions, chemotherapy precautions) and verbalized understanding.  PIV positive for blood return, saline locked and removed prior to DC, catheter tip intact. Pt DC with no distress noted, ambulated off of unit, w/ family, w/o event, pleased.

## 2023-03-03 DIAGNOSIS — R06.02 SHORTNESS OF BREATH: ICD-10-CM

## 2023-03-03 DIAGNOSIS — M06.9 RHEUMATOID ARTHRITIS, INVOLVING UNSPECIFIED SITE, UNSPECIFIED WHETHER RHEUMATOID FACTOR PRESENT: Primary | ICD-10-CM

## 2023-03-10 ENCOUNTER — INFUSION (OUTPATIENT)
Dept: INFUSION THERAPY | Facility: HOSPITAL | Age: 53
End: 2023-03-10
Payer: MEDICAID

## 2023-03-10 VITALS
SYSTOLIC BLOOD PRESSURE: 109 MMHG | WEIGHT: 248 LBS | DIASTOLIC BLOOD PRESSURE: 74 MMHG | BODY MASS INDEX: 39.86 KG/M2 | RESPIRATION RATE: 18 BRPM | HEART RATE: 90 BPM | TEMPERATURE: 98 F | HEIGHT: 66 IN

## 2023-03-10 DIAGNOSIS — M06.9 RHEUMATOID ARTHRITIS, INVOLVING UNSPECIFIED SITE, UNSPECIFIED WHETHER RHEUMATOID FACTOR PRESENT: Primary | ICD-10-CM

## 2023-03-10 PROCEDURE — 96365 THER/PROPH/DIAG IV INF INIT: CPT

## 2023-03-10 PROCEDURE — 96376 TX/PRO/DX INJ SAME DRUG ADON: CPT

## 2023-03-10 PROCEDURE — 63600175 PHARM REV CODE 636 W HCPCS: Performed by: INTERNAL MEDICINE

## 2023-03-10 PROCEDURE — 96415 CHEMO IV INFUSION ADDL HR: CPT

## 2023-03-10 PROCEDURE — 96413 CHEMO IV INFUSION 1 HR: CPT

## 2023-03-10 PROCEDURE — 25000003 PHARM REV CODE 250: Performed by: INTERNAL MEDICINE

## 2023-03-10 PROCEDURE — 96375 TX/PRO/DX INJ NEW DRUG ADDON: CPT

## 2023-03-10 PROCEDURE — 96367 TX/PROPH/DG ADDL SEQ IV INF: CPT

## 2023-03-10 RX ORDER — FAMOTIDINE 10 MG/ML
20 INJECTION INTRAVENOUS
Status: CANCELLED | OUTPATIENT
Start: 2023-03-10

## 2023-03-10 RX ORDER — ACETAMINOPHEN 325 MG/1
650 TABLET ORAL
Status: COMPLETED | OUTPATIENT
Start: 2023-03-10 | End: 2023-03-10

## 2023-03-10 RX ORDER — HEPARIN 100 UNIT/ML
500 SYRINGE INTRAVENOUS
Status: CANCELLED | OUTPATIENT
Start: 2023-03-10

## 2023-03-10 RX ORDER — ACETAMINOPHEN 325 MG/1
650 TABLET ORAL
Status: CANCELLED | OUTPATIENT
Start: 2023-03-10

## 2023-03-10 RX ORDER — SODIUM CHLORIDE 0.9 % (FLUSH) 0.9 %
10 SYRINGE (ML) INJECTION
Status: DISCONTINUED | OUTPATIENT
Start: 2023-03-10 | End: 2023-03-10 | Stop reason: HOSPADM

## 2023-03-10 RX ORDER — MEPERIDINE HYDROCHLORIDE 50 MG/ML
25 INJECTION INTRAMUSCULAR; INTRAVENOUS; SUBCUTANEOUS
Status: DISCONTINUED | OUTPATIENT
Start: 2023-03-10 | End: 2023-03-10 | Stop reason: HOSPADM

## 2023-03-10 RX ORDER — MEPERIDINE HYDROCHLORIDE 100 MG/ML
25 INJECTION INTRAMUSCULAR; INTRAVENOUS; SUBCUTANEOUS ONCE
Status: DISCONTINUED | OUTPATIENT
Start: 2023-03-10 | End: 2023-03-10

## 2023-03-10 RX ORDER — FAMOTIDINE 10 MG/ML
20 INJECTION INTRAVENOUS
Status: COMPLETED | OUTPATIENT
Start: 2023-03-10 | End: 2023-03-10

## 2023-03-10 RX ORDER — SODIUM CHLORIDE 0.9 % (FLUSH) 0.9 %
10 SYRINGE (ML) INJECTION
Status: CANCELLED | OUTPATIENT
Start: 2023-03-10

## 2023-03-10 RX ADMIN — FAMOTIDINE 20 MG: 10 INJECTION INTRAVENOUS at 09:03

## 2023-03-10 RX ADMIN — SODIUM CHLORIDE: 9 INJECTION, SOLUTION INTRAVENOUS at 08:03

## 2023-03-10 RX ADMIN — DIPHENHYDRAMINE HYDROCHLORIDE 25 MG: 50 INJECTION, SOLUTION INTRAMUSCULAR; INTRAVENOUS at 08:03

## 2023-03-10 RX ADMIN — ACETAMINOPHEN 650 MG: 325 TABLET ORAL at 08:03

## 2023-03-10 RX ADMIN — DEXTROSE MONOHYDRATE 100 MG: 50 INJECTION, SOLUTION INTRAVENOUS at 09:03

## 2023-03-10 RX ADMIN — RITUXIMAB 1000 MG: 10 INJECTION, SOLUTION INTRAVENOUS at 10:03

## 2023-03-10 NOTE — PLAN OF CARE
1356  Infusion completed, pt tolerated; pt instructed to increase water hydration daily and prior to IV starts; discussed when to contact MD, when to report to ER; discussed reporting for next Rituxan infusion not later than six months following today and reason for same; pt and spouse verbalized understanding of all discussed and when to report next

## 2023-03-10 NOTE — NURSING
0813  Pt here for Rituxan infusion, accompanied by spouse, no new complaints or concerns at present, reports tolerating prior treatment; discussed treatment plan for today, all questions answered and pt agrees to proceed

## 2023-03-17 ENCOUNTER — OFFICE VISIT (OUTPATIENT)
Dept: HEMATOLOGY/ONCOLOGY | Facility: CLINIC | Age: 53
End: 2023-03-17
Payer: MEDICAID

## 2023-03-17 VITALS
TEMPERATURE: 98 F | HEIGHT: 66 IN | BODY MASS INDEX: 39.4 KG/M2 | RESPIRATION RATE: 17 BRPM | HEART RATE: 99 BPM | SYSTOLIC BLOOD PRESSURE: 134 MMHG | OXYGEN SATURATION: 94 % | WEIGHT: 245.13 LBS | DIASTOLIC BLOOD PRESSURE: 60 MMHG

## 2023-03-17 DIAGNOSIS — M05.712 RHEUMATOID ARTHRITIS INVOLVING BOTH SHOULDERS WITH POSITIVE RHEUMATOID FACTOR: Primary | ICD-10-CM

## 2023-03-17 DIAGNOSIS — Z79.631 METHOTREXATE, LONG TERM, CURRENT USE: ICD-10-CM

## 2023-03-17 DIAGNOSIS — M05.711 RHEUMATOID ARTHRITIS INVOLVING BOTH SHOULDERS WITH POSITIVE RHEUMATOID FACTOR: Primary | ICD-10-CM

## 2023-03-17 DIAGNOSIS — R71.8 ERYTHROCYTE ABNORMALITY: ICD-10-CM

## 2023-03-17 PROCEDURE — 3078F DIAST BP <80 MM HG: CPT | Mod: CPTII,NTX,, | Performed by: INTERNAL MEDICINE

## 2023-03-17 PROCEDURE — 3078F PR MOST RECENT DIASTOLIC BLOOD PRESSURE < 80 MM HG: ICD-10-PCS | Mod: CPTII,NTX,, | Performed by: INTERNAL MEDICINE

## 2023-03-17 PROCEDURE — 3075F PR MOST RECENT SYSTOLIC BLOOD PRESS GE 130-139MM HG: ICD-10-PCS | Mod: CPTII,NTX,, | Performed by: INTERNAL MEDICINE

## 2023-03-17 PROCEDURE — 99205 PR OFFICE/OUTPT VISIT, NEW, LEVL V, 60-74 MIN: ICD-10-PCS | Mod: S$PBB,NTX,, | Performed by: INTERNAL MEDICINE

## 2023-03-17 PROCEDURE — 1159F PR MEDICATION LIST DOCUMENTED IN MEDICAL RECORD: ICD-10-PCS | Mod: CPTII,NTX,, | Performed by: INTERNAL MEDICINE

## 2023-03-17 PROCEDURE — 1159F MED LIST DOCD IN RCRD: CPT | Mod: CPTII,NTX,, | Performed by: INTERNAL MEDICINE

## 2023-03-17 PROCEDURE — 99214 OFFICE O/P EST MOD 30 MIN: CPT | Mod: PBBFAC,TXP | Performed by: INTERNAL MEDICINE

## 2023-03-17 PROCEDURE — 99999 PR PBB SHADOW E&M-EST. PATIENT-LVL IV: ICD-10-PCS | Mod: PBBFAC,TXP,, | Performed by: INTERNAL MEDICINE

## 2023-03-17 PROCEDURE — 99999 PR PBB SHADOW E&M-EST. PATIENT-LVL IV: CPT | Mod: PBBFAC,TXP,, | Performed by: INTERNAL MEDICINE

## 2023-03-17 PROCEDURE — 3008F BODY MASS INDEX DOCD: CPT | Mod: CPTII,NTX,, | Performed by: INTERNAL MEDICINE

## 2023-03-17 PROCEDURE — 3075F SYST BP GE 130 - 139MM HG: CPT | Mod: CPTII,NTX,, | Performed by: INTERNAL MEDICINE

## 2023-03-17 PROCEDURE — 3008F PR BODY MASS INDEX (BMI) DOCUMENTED: ICD-10-PCS | Mod: CPTII,NTX,, | Performed by: INTERNAL MEDICINE

## 2023-03-17 PROCEDURE — 99205 OFFICE O/P NEW HI 60 MIN: CPT | Mod: S$PBB,NTX,, | Performed by: INTERNAL MEDICINE

## 2023-03-17 NOTE — PROGRESS NOTES
Hematology and Medical Oncology   New Patient Consult     03/17/2023  Referred by:  Dr. Roberto Carlos Tamez    Reason For Referral: Erythrocyte abnormality      History of Present Ilness:   Joaquín Barrett) is a pleasant 52 y.o.male who presents with his wife to discuss his red cells.  On chart review hemoglobin has stayed within the normal range for men between 15-17.     He generally feels well and denies symptoms like blurry vision, regular headaches, itching when he leaves the shower.    PAST MEDICAL HISTORY:   Past Medical History:   Diagnosis Date    HLD (hyperlipidemia)     HTN (hypertension)     MARTI on CPAP     Rheumatoid arthritis, unspecified        PAST SURGICAL HISTORY:   No past surgical history on file.    PAST SOCIAL HISTORY:   reports that he has never smoked. He has never been exposed to tobacco smoke. His smokeless tobacco use includes chew. He reports that he does not drink alcohol and does not use drugs.    FAMILY HISTORY:  No family history on file.    CURRENT MEDICATIONS:   Current Outpatient Medications   Medication Sig    amLODIPine (NORVASC) 10 MG tablet amlodipine 10 mg tablet   TAKE 1 TABLET BY MOUTH EVERY DAY    atorvastatin (LIPITOR) 20 MG tablet atorvastatin 20 mg tablet   TAKE 1 TABLET BY MOUTH EVERY DAY    budesonide (PULMICORT) 0.5 mg/2 mL nebulizer solution 2 mLs.    budesonide 1 mg/2 mL [facility] budesonide 1 mg/2 mL suspension for nebulization   INHALE 2 ML TWICE A DAY BY NEBULIZATION ROUTE AS NEEDED.    busPIRone (BUSPAR) 10 MG tablet Take 10 mg by mouth 2 (two) times daily.    diazePAM (VALIUM) 5 MG tablet Take 1 tablet (5 mg total) by mouth every 6 (six) hours as needed for Anxiety.    fluticasone propionate (FLONASE) 50 mcg/actuation nasal spray fluticasone propionate 50 mcg/actuation nasal spray,suspension   SPRAY 2 SPRAYS INTO EACH NOSTRIL EVERY DAY    folic acid (FOLVITE) 1 MG tablet Take 1 tablet (1 mg total) by mouth once daily.    leflunomide (ARAVA) 20 MG Tab Take 1  tablet (20 mg total) by mouth once daily.    predniSONE (DELTASONE) 5 MG tablet Take 2 tablets (10 mg total) by mouth once daily.    sulfamethoxazole-trimethoprim 400-80mg (BACTRIM,SEPTRA) 400-80 mg per tablet Take 1 tablet by mouth every Mon, Wed, Fri.    telmisartan-hydrochlorothiazide (MICARDIS HCT) 80-25 mg per tablet telmisartan 80 mg-hydrochlorothiazide 25 mg tablet   TAKE 1 TABLET BY MOUTH EVERY DAY    venlafaxine (EFFEXOR) 37.5 MG Tab Take 37.5 mg by mouth.     No current facility-administered medications for this visit.     ALLERGIES:   Review of patient's allergies indicates:  No Known Allergies      Review of Systems:     Review of Systems   Constitutional:  Negative for appetite change, chills, diaphoresis, fatigue, fever and unexpected weight change.   HENT:   Negative for hearing loss, mouth sores, nosebleeds, sore throat, trouble swallowing and voice change.    Eyes:  Negative for eye problems and icterus.   Respiratory:  Negative for chest tightness, cough, hemoptysis, shortness of breath and wheezing.    Cardiovascular:  Negative for chest pain, leg swelling and palpitations.   Gastrointestinal:  Negative for abdominal distention, abdominal pain, blood in stool, diarrhea, nausea and vomiting.   Endocrine: Negative for hot flashes.   Genitourinary:  Negative for bladder incontinence, difficulty urinating, dysuria, frequency and hematuria.    Musculoskeletal:  Negative for arthralgias, back pain, flank pain, gait problem, myalgias, neck pain and neck stiffness.   Skin:  Negative for itching, rash and wound.   Neurological:  Negative for dizziness, extremity weakness, gait problem, headaches, numbness, seizures and speech difficulty.   Hematological:  Negative for adenopathy. Does not bruise/bleed easily.   Psychiatric/Behavioral:  Negative for confusion, depression and sleep disturbance. The patient is not nervous/anxious.         Physical Exam:     Vitals:    03/17/23 1006   BP: 134/60   Pulse: 99    Resp: 17   Temp: 98.1 °F (36.7 °C)     Physical Exam  Constitutional:       General: He is not in acute distress.     Appearance: He is well-developed. He is not diaphoretic.   HENT:      Head: Normocephalic and atraumatic.      Mouth/Throat:      Pharynx: No oropharyngeal exudate.   Eyes:      Conjunctiva/sclera: Conjunctivae normal.      Pupils: Pupils are equal, round, and reactive to light.   Neck:      Thyroid: No thyromegaly.      Vascular: No JVD.      Trachea: No tracheal deviation.   Cardiovascular:      Rate and Rhythm: Normal rate and regular rhythm.      Heart sounds: Normal heart sounds. No murmur heard.    No friction rub.   Pulmonary:      Effort: Pulmonary effort is normal. No respiratory distress.      Breath sounds: Normal breath sounds. No stridor. No wheezing or rales.   Chest:      Chest wall: No tenderness.   Abdominal:      General: Bowel sounds are normal. There is no distension.      Palpations: Abdomen is soft.      Tenderness: There is no abdominal tenderness. There is no guarding or rebound.   Musculoskeletal:         General: No tenderness or deformity. Normal range of motion.      Cervical back: Normal range of motion and neck supple.   Skin:     General: Skin is warm and dry.      Capillary Refill: Capillary refill takes less than 2 seconds.      Coloration: Skin is not pale.      Findings: No erythema or rash.   Neurological:      Mental Status: He is alert and oriented to person, place, and time.      Cranial Nerves: No cranial nerve deficit.      Sensory: No sensory deficit.      Motor: No abnormal muscle tone.      Coordination: Coordination normal.      Deep Tendon Reflexes: Reflexes normal.   Psychiatric:         Behavior: Behavior normal.         Thought Content: Thought content normal.         Judgment: Judgment normal.       ECOG Performance Status: (foot note - ECOG PS provided by Eastern Cooperative Oncology Group) 0 - Asymptomatic    Karnofsky Performance Score:  100%-  Normal, No Complaints, No Evidence of Disease    Labs:   Lab Results   Component Value Date    WBC 5.02 02/02/2023    HGB 16.5 02/02/2023    HCT 49.4 02/02/2023     02/02/2023    CHOL 198 02/02/2023    TRIG 234 (H) 02/02/2023    HDL 52 02/02/2023    ALT 61 (H) 02/02/2023    AST 36 02/02/2023     02/02/2023    K 4.1 02/02/2023     02/02/2023    CREATININE 0.9 02/02/2023    BUN 13 02/02/2023    CO2 25 02/02/2023         Imaging: Previous imaging has been reviewed     Assessment and Plan:     Mr. Rod is a pleasant 52 year old male with a slightly elevated hemoglobin level.    Erythrocyte abnormality  --Consistently within the normal range  --no symptoms or recurrent clots to suggest an underlying MNP disease  --would not pursue more invasive testing at this time    25 minutes were spent face to face with the patient and his  family to discuss the disease, natural history, treatment options. I have provided the patient with an opportunity to ask questions and have all questions answered to his satisfaction.       he will return to clinic PRN, but knows to call in the interim if symptoms change or should a problem arise.        Madhavi Sigala MD  Hematology and Medical Oncology  [facility]  [facility]

## 2023-03-21 ENCOUNTER — TELEPHONE (OUTPATIENT)
Dept: RHEUMATOLOGY | Facility: CLINIC | Age: 53
End: 2023-03-21
Payer: MEDICAID

## 2023-05-03 ENCOUNTER — HOSPITAL ENCOUNTER (OUTPATIENT)
Dept: RADIOLOGY | Facility: HOSPITAL | Age: 53
Discharge: HOME | End: 2023-05-03
Attending: NURSE PRACTITIONER
Payer: MEDICAID

## 2023-05-03 ENCOUNTER — TELEPHONE (OUTPATIENT)
Dept: ADMINISTRATIVE | Facility: HOSPITAL | Age: 53
End: 2023-05-03
Payer: MEDICAID

## 2023-05-03 ENCOUNTER — OFFICE VISIT (OUTPATIENT)
Dept: RHEUMATOLOGY | Facility: CLINIC | Age: 53
End: 2023-05-03
Payer: MEDICAID

## 2023-05-03 VITALS
BODY MASS INDEX: 39.18 KG/M2 | WEIGHT: 243.81 LBS | DIASTOLIC BLOOD PRESSURE: 80 MMHG | HEART RATE: 87 BPM | HEIGHT: 66 IN | SYSTOLIC BLOOD PRESSURE: 127 MMHG

## 2023-05-03 DIAGNOSIS — E78.5 DYSLIPIDEMIA: ICD-10-CM

## 2023-05-03 DIAGNOSIS — M05.711 RHEUMATOID ARTHRITIS INVOLVING BOTH SHOULDERS WITH POSITIVE RHEUMATOID FACTOR: Primary | ICD-10-CM

## 2023-05-03 DIAGNOSIS — Z79.631 METHOTREXATE, LONG TERM, CURRENT USE: ICD-10-CM

## 2023-05-03 DIAGNOSIS — M06.9 RHEUMATOID ARTHRITIS, INVOLVING UNSPECIFIED SITE, UNSPECIFIED WHETHER RHEUMATOID FACTOR PRESENT: ICD-10-CM

## 2023-05-03 DIAGNOSIS — L98.9 SKIN LESION: ICD-10-CM

## 2023-05-03 DIAGNOSIS — M60.9 MYOSITIS, UNSPECIFIED MYOSITIS TYPE, UNSPECIFIED SITE: ICD-10-CM

## 2023-05-03 DIAGNOSIS — K92.1 BLOOD IN STOOL: ICD-10-CM

## 2023-05-03 DIAGNOSIS — M05.711 RHEUMATOID ARTHRITIS INVOLVING BOTH SHOULDERS WITH POSITIVE RHEUMATOID FACTOR: ICD-10-CM

## 2023-05-03 DIAGNOSIS — E80.6 BILIRUBINEMIA: ICD-10-CM

## 2023-05-03 DIAGNOSIS — M05.712 RHEUMATOID ARTHRITIS INVOLVING BOTH SHOULDERS WITH POSITIVE RHEUMATOID FACTOR: ICD-10-CM

## 2023-05-03 DIAGNOSIS — R74.01 HIGH TRANSAMINASE LEVELS: ICD-10-CM

## 2023-05-03 DIAGNOSIS — I10 ESSENTIAL HYPERTENSION: ICD-10-CM

## 2023-05-03 DIAGNOSIS — M05.712 RHEUMATOID ARTHRITIS INVOLVING BOTH SHOULDERS WITH POSITIVE RHEUMATOID FACTOR: Primary | ICD-10-CM

## 2023-05-03 DIAGNOSIS — E66.01 MORBID OBESITY: ICD-10-CM

## 2023-05-03 PROCEDURE — 3079F PR MOST RECENT DIASTOLIC BLOOD PRESSURE 80-89 MM HG: ICD-10-PCS | Mod: CPTII,NTX,, | Performed by: INTERNAL MEDICINE

## 2023-05-03 PROCEDURE — 99213 OFFICE O/P EST LOW 20 MIN: CPT | Mod: S$PBB,NTX,, | Performed by: INTERNAL MEDICINE

## 2023-05-03 PROCEDURE — 99213 PR OFFICE/OUTPT VISIT, EST, LEVL III, 20-29 MIN: ICD-10-PCS | Mod: S$PBB,NTX,, | Performed by: INTERNAL MEDICINE

## 2023-05-03 PROCEDURE — 1159F MED LIST DOCD IN RCRD: CPT | Mod: CPTII,NTX,, | Performed by: INTERNAL MEDICINE

## 2023-05-03 PROCEDURE — 99999 PR PBB SHADOW E&M-EST. PATIENT-LVL III: ICD-10-PCS | Mod: PBBFAC,TXP,, | Performed by: INTERNAL MEDICINE

## 2023-05-03 PROCEDURE — 3079F DIAST BP 80-89 MM HG: CPT | Mod: CPTII,NTX,, | Performed by: INTERNAL MEDICINE

## 2023-05-03 PROCEDURE — 3074F PR MOST RECENT SYSTOLIC BLOOD PRESSURE < 130 MM HG: ICD-10-PCS | Mod: CPTII,NTX,, | Performed by: INTERNAL MEDICINE

## 2023-05-03 PROCEDURE — 3008F PR BODY MASS INDEX (BMI) DOCUMENTED: ICD-10-PCS | Mod: CPTII,NTX,, | Performed by: INTERNAL MEDICINE

## 2023-05-03 PROCEDURE — 99213 OFFICE O/P EST LOW 20 MIN: CPT | Mod: PBBFAC,25,TXP | Performed by: INTERNAL MEDICINE

## 2023-05-03 PROCEDURE — 3008F BODY MASS INDEX DOCD: CPT | Mod: CPTII,NTX,, | Performed by: INTERNAL MEDICINE

## 2023-05-03 PROCEDURE — 1159F PR MEDICATION LIST DOCUMENTED IN MEDICAL RECORD: ICD-10-PCS | Mod: CPTII,NTX,, | Performed by: INTERNAL MEDICINE

## 2023-05-03 PROCEDURE — 76700 US EXAM ABDOM COMPLETE: CPT | Mod: TC,NTX

## 2023-05-03 PROCEDURE — 76700 US ABDOMEN COMPLETE: ICD-10-PCS | Mod: 26,NTX,, | Performed by: STUDENT IN AN ORGANIZED HEALTH CARE EDUCATION/TRAINING PROGRAM

## 2023-05-03 PROCEDURE — 99999 PR PBB SHADOW E&M-EST. PATIENT-LVL III: CPT | Mod: PBBFAC,TXP,, | Performed by: INTERNAL MEDICINE

## 2023-05-03 PROCEDURE — 3074F SYST BP LT 130 MM HG: CPT | Mod: CPTII,NTX,, | Performed by: INTERNAL MEDICINE

## 2023-05-03 PROCEDURE — 76700 US EXAM ABDOM COMPLETE: CPT | Mod: 26,NTX,, | Performed by: STUDENT IN AN ORGANIZED HEALTH CARE EDUCATION/TRAINING PROGRAM

## 2023-05-03 RX ORDER — LEFLUNOMIDE 20 MG/1
20 TABLET ORAL DAILY
Qty: 90 TABLET | Refills: 3 | Status: SHIPPED | OUTPATIENT
Start: 2023-05-03 | End: 2023-05-03 | Stop reason: SDUPTHER

## 2023-05-03 RX ORDER — PREDNISONE 5 MG/1
10 TABLET ORAL DAILY
Qty: 60 TABLET | Refills: 1 | Status: SHIPPED | OUTPATIENT
Start: 2023-05-03 | End: 2023-05-03 | Stop reason: SDUPTHER

## 2023-05-03 RX ORDER — METHOTREXATE 25 MG/.5ML
25 INJECTION, SOLUTION SUBCUTANEOUS
Qty: 4 EACH | Refills: 2 | Status: ACTIVE | OUTPATIENT
Start: 2023-05-03 | End: 2023-05-15 | Stop reason: SDUPTHER

## 2023-05-03 RX ORDER — SULFAMETHOXAZOLE AND TRIMETHOPRIM 400; 80 MG/1; MG/1
1 TABLET ORAL
Qty: 60 TABLET | Refills: 3 | Status: SHIPPED | OUTPATIENT
Start: 2023-05-03 | End: 2023-05-03 | Stop reason: SDUPTHER

## 2023-05-03 RX ORDER — LEFLUNOMIDE 20 MG/1
20 TABLET ORAL DAILY
Qty: 90 TABLET | Refills: 0 | Status: SHIPPED | OUTPATIENT
Start: 2023-05-03 | End: 2023-07-13

## 2023-05-03 RX ORDER — PREDNISONE 5 MG/1
10 TABLET ORAL DAILY
Qty: 60 TABLET | Refills: 1 | Status: SHIPPED | OUTPATIENT
Start: 2023-05-03 | End: 2023-07-14 | Stop reason: SDUPTHER

## 2023-05-03 RX ORDER — METHOTREXATE 25 MG/ML
25 INJECTION, SOLUTION INTRA-ARTERIAL; INTRAMUSCULAR; INTRAVENOUS
Qty: 10 ML | Refills: 3 | Status: SHIPPED | OUTPATIENT
Start: 2023-05-03 | End: 2023-05-03

## 2023-05-03 RX ORDER — FOLIC ACID 1 MG/1
1 TABLET ORAL DAILY
Qty: 90 TABLET | Refills: 3 | Status: SHIPPED | OUTPATIENT
Start: 2023-05-03 | End: 2023-07-14

## 2023-05-03 RX ORDER — SULFAMETHOXAZOLE AND TRIMETHOPRIM 400; 80 MG/1; MG/1
1 TABLET ORAL
Qty: 36 TABLET | Refills: 3 | Status: SHIPPED | OUTPATIENT
Start: 2023-05-03 | End: 2023-07-14 | Stop reason: SDUPTHER

## 2023-05-03 RX ORDER — METHOTREXATE 25 MG/.5ML
25 INJECTION, SOLUTION SUBCUTANEOUS
Qty: 4 EACH | Refills: 2 | Status: SHIPPED | OUTPATIENT
Start: 2023-05-03 | End: 2023-05-03 | Stop reason: SDUPTHER

## 2023-05-03 ASSESSMENT — ROUTINE ASSESSMENT OF PATIENT INDEX DATA (RAPID3)
MDHAQ FUNCTION SCORE: 1.9
PATIENT GLOBAL ASSESSMENT SCORE: 6
TOTAL RAPID3 SCORE: 6.61
PSYCHOLOGICAL DISTRESS SCORE: 6.6
AM STIFFNESS SCORE: 1, YES
PAIN SCORE: 7.5
WHEN YOU AWAKENED IN THE MORNING OVER THE LAST WEEK, PLEASE INDICATE THE AMOUNT OF TIME IT TAKES UNTIL YOU ARE AS LIMBER AS YOU WILL BE FOR THE DAY: 1
FATIGUE SCORE: 7.5

## 2023-05-08 NOTE — PROGRESS NOTES
"Subjective:      Patient ID: Joaquín Rod is a 52 y.o. male.    Chief Complaint: Disease Management    51 year old man with PMH Seronegative RA with positive Anti CarP, HTN, HLD, SOB presents for follow up. Patient managed on Rituxan, Arava, Prednisone 10 mg daily. Patient was started on Rituxan and received this medication 2/24 and 3/10/23. He reports significant initial relief, Unfortunately he now continues with joint pain, swelling, and stiffness throughout his body leading to difficulties cleaning, bathing, dressing himself etc.  He also reports continued SOB and proximal weakness. He struggles to walk a flight of stairs due to pain and weakness. He was recently seen by pulmonology Dr. Oshea with continued downtrending PFT values.   Review of Systems   Constitutional:  Positive for fatigue. Negative for activity change, appetite change, chills and fever.   Respiratory:  Positive for shortness of breath. Negative for apnea, cough, choking, chest tightness, wheezing and stridor.    Cardiovascular:  Negative for chest pain, palpitations and leg swelling.   Gastrointestinal:  Negative for abdominal distention, abdominal pain, constipation, diarrhea and vomiting.   Musculoskeletal:  Positive for arthralgias, gait problem and joint swelling. Negative for back pain, neck pain and neck stiffness.   Skin:  Negative for color change, pallor, rash and wound.   Neurological:  Negative for dizziness, facial asymmetry, light-headedness and headaches.      Objective:   /80   Pulse 87   Ht 5' 6" (1.676 m)   Wt 110.6 kg (243 lb 13.3 oz)   BMI 39.35 kg/m²   Physical Exam   Constitutional: normal appearance. He appears obese. No distress. He does not appear ill.   HENT:   Head: Normocephalic and atraumatic.   Nose: Nose normal. No rhinorrhea or nasal congestion.   Mouth/Throat: Mucous membranes are moist. No oropharyngeal exudate or posterior oropharyngeal erythema. Oropharynx is clear.   Eyes: Pupils are equal, " round, and reactive to light. Conjunctivae are normal. Right eye exhibits no discharge. Left eye exhibits no discharge. No scleral icterus.   Neck: Carotid bruit is not present.   Cardiovascular: Normal rate, regular rhythm, normal heart sounds and normal pulses. Exam reveals no gallop and no friction rub.   No murmur heard.  Pulmonary/Chest: Effort normal and breath sounds normal. No stridor. No respiratory distress. He has no wheezes. He has no rhonchi. He has no rales. He exhibits no tenderness.   Abdominal: Bowel sounds are normal. He exhibits no distension and no mass. There is no abdominal tenderness. There is no rebound and no guarding. No hernia.   Musculoskeletal:         General: Swelling and tenderness present. No deformity or signs of injury. Normal range of motion.      Cervical back: Normal range of motion and neck supple. No rigidity or tenderness.      Right lower leg: No edema.      Left lower leg: No edema.   Lymphadenopathy:     He has no cervical adenopathy.   Neurological: He is alert.   Skin: Skin is warm and dry.   Vitals reviewed.     7/20/2022 9/28/2022 1/25/2023 5/3/2023   Tender (JOSEPH-28) 13 / 28 25 / 28 26 / 28 22 / 28    Swollen (JOSEPH-28) 8 / 28 6 / 28 12 / 28 18 / 28    Provider Global 25 mm 35 mm 80 mm 70 mm   Patient Global 50 mm 70 mm 70 mm 75 mm   ESR 24 mm/hr 23 mm/hr 23 mm/hr 3 mm/hr   CRP 2.6 mg/L 1 mg/L 1 mg/L 0.4 mg/L   JOSEPH-28 (ESR) 5.74 (High disease activity) 6.66 (High disease activity) 7 (High disease activity) 5.63 (High disease activity)   JOSEPH-28 (CRP) 4.93 (Moderate disease activity) 5.68 (High disease activity) 6.01 (High disease activity) 5.95 (High disease activity)   CDAI Score 28.5  41.5  53  54.5            Assessment:     Seronegative RF: Patient with positive CarP Ab. He has failed TNF, Orencia, and now Actemra (though with better relief from actemra than others). Not a candidate for JAKs at this time given CVD comorbidities. On Arava, Prednisone 10 daily.  S/p Rituxan with initial relief, though not long lasting. MTX held due to initial concerns for lung pathology.   -Will reinitiate MTX 25 mg inj at this time with daily folate. No longer concerned for MTX induced lung injury. Will re evaluate patient prior to next scheduled rituxan administration and suspect will continue to plan for rituximab q 6 mo at that time.   -Check CBC/CMP in 1 month  -Check CBC/CMP/esr/crp at this time  -continue bactrim for prophylaxis.   -Asked patient to bring in vaccine records from PCP     SOB: HRCT unremarkable. PFTs worsening  -Continue to follow with advanced lung     Strongyloides Ab positive: Patient and wife state that he did take recently prescribed ivermectin.      LE weakness: Proximal LE predominantly. Making it difficult for patient to climb stairs. PL12 positive in the past. Aldolase, CK, PL12, and MRIs negative for myositis at this time. Suspect significant contribution from steroids. Encouraged patient to continue to PT exercises.   -Will get labs today as previously ordered for further evaluation  -Continue to PT/OT exercises.     Melena: patient reports melena episodes ~ 1 mo ago.   -Will check anemia workup and refer to GI        Plan:     Problem List Items Addressed This Visit          Immunology/Multi System    Rheumatoid arthritis - Primary    Relevant Orders    CBC W/ AUTO DIFFERENTIAL    COMPREHENSIVE METABOLIC PANEL    Sedimentation rate    C-REACTIVE PROTEIN    CK (Completed)    Urinalysis (Completed)    Urinalysis Microscopic (Completed)    FOLATE (Completed)    Vitamin B12 (Completed)    Iron and TIBC (Completed)    FERRITIN (Completed)    Urinalysis (Completed)    CBC W/ AUTO DIFFERENTIAL (Completed)    COMPREHENSIVE METABOLIC PANEL (Completed)    Sedimentation rate (Completed)    C-REACTIVE PROTEIN (Completed)    ANTI-DNA ANTIBODY, DOUBLE-STRANDED (Completed)    Protein / creatinine ratio, urine (Completed)    C4 COMPLEMENT (Completed)    C3 COMPLEMENT  (Completed)     Other Visit Diagnoses       Blood in stool        Relevant Orders    Ambulatory referral/consult to Gastroenterology    Skin lesion        Relevant Orders    Ambulatory referral/consult to Dermatology          Roberto Carlos Tamez

## 2023-05-10 NOTE — PROGRESS NOTES
I have personally reviewed the history, confirmed exam findings, and discussed assessment and plan with fellow.         Saw Dr. Lemos in [facility] 2/2/23:    51 yo M with hx/o Seronegative RA on MTX, Prednisone, leflunomide coming in for evaluation of possible ILD.  His underlying CTD has been refractory to multiple DMARD in the past with continued severe synovitis.  I appreciated bilateral BE on the CT chest and a notable drop in his PFTs compared to 6 months and further drop when compared to testing from 2 years ago.  No exercise limiting desaturation noted on 6MWT.  Unclear etiology of B/L LE edema with grossly normal TTE, renal/liver function.  Unclear etiology of erythrocytemia.       -Overall, I am not confident about the etiology of drop in lung function.  I reviewed the CT chest with the radiologist and except for possible obesity associated basilar changes and bronchiectasis nothing was obvious.  The minimal CT findings seem out of proportion to explain the ongoing lung function decline.  One thought was MTX associated pneumonitis but his CT chest findings are not congruent with pneumonitis; another possibility is indolent CTD-ILD but based on the findings on CT this does not waraant a biopsy at this time.  In whole, there is a possibility of COVID associated sequelae that may be culminating to these findings vs. Underlying neuromuscular weakness vs cardiac/PA etiology.     -recommend referring provider to consider referral to hematology        RTC 3 months with full PFTs with MEP/MIP/MVV/airway resistance, ABG, Echo      CPX 10/19/22: Severe functional impairment associated with a normal breathing reserve, normal oxygen stauration, poor effort, and a reduced AT. These findings are indicative of functional impairment secondary to poor effort.  The ECG portion of this study is negative for myocardial ischemia.  There was no ST segment deviation noted during stress.  The test was stopped because the patient  experienced fatigue.  The patient's exercise capacity was below average.  There were no arrhythmias during stress    Holter 10/19/22 :Sinus rhythm with rare PACs/PVCs      csDMARDs:  Methotrexate 25mg sc once a week since onset but held with concern for methotrexate contribution to restrictive lung disease but CT chest negative but never resumed  Leflunomide 20mg daily continued  Prednisone 10mg daily has not been able to taper    Etanercept: ineffective  Adalimumab: ineffective  Abatacept:Ineffective  Tocilizumab: minimally effective  Rituximab 1000mg IV 2/24/23 and 3/10/23, only 2 weeks improvement, now back to baseline    Jakinibs: risk: obesity   The 10-year ASCVD risk score (Lucita DK, et al., 2019) is: 5.1%    Values used to calculate the score:      Age: 52 years      Sex: Male      Is Non- : No      Diabetic: No      Tobacco smoker: No      Systolic Blood Pressure: 127 mmHg      Is BP treated: Yes      HDL Cholesterol: 52 mg/dL      Total Cholesterol: 198 mg/dL         RA RF- ACPA-NADYA+ CarP+  polyarthritis TJ 22 SJ 18 Pt global 75  ESR 3 CRP 0.4  DAS28 5.63 GPX27-LLT 5.95 CDAI 54.5(all HDA)  Restrictive lung disease with normal HRCT chest, following with Dr. Lemos in [facility]  Intermittently elevated CK, normal myositis autoantibodies, normal MRI thighs  Fatty liver followed in Hepatology [facility]  S/p Strongyloides Rx with ivermectin    Will add back methotrexate(Rasuvo) 25mg sc once a week with folic acid 1mg daily  F/u 6/12/23 with standing labs and with PFTs, TTE and Dr. Lemos  If no improvement with resumption of methotrexate could add hydroxychloroquine a/o sulfasalazine  Ref to GI for history of melena  Also reconsider Jakinib, preferably upadacitinib

## 2023-05-15 ENCOUNTER — PATIENT MESSAGE (OUTPATIENT)
Dept: RHEUMATOLOGY | Facility: CLINIC | Age: 53
End: 2023-05-15
Payer: MEDICAID

## 2023-05-15 DIAGNOSIS — M06.9 RHEUMATOID ARTHRITIS, INVOLVING UNSPECIFIED SITE, UNSPECIFIED WHETHER RHEUMATOID FACTOR PRESENT: ICD-10-CM

## 2023-05-15 RX ORDER — METHOTREXATE 25 MG/.5ML
25 INJECTION, SOLUTION SUBCUTANEOUS
Qty: 4 EACH | Refills: 2 | Status: ACTIVE | OUTPATIENT
Start: 2023-05-15 | End: 2023-05-30 | Stop reason: SDUPTHER

## 2023-05-17 ENCOUNTER — SPECIALTY PHARMACY (OUTPATIENT)
Dept: PHARMACY | Facility: CLINIC | Age: 53
End: 2023-05-17
Payer: MEDICAID

## 2023-05-17 NOTE — TELEPHONE ENCOUNTER
Order for Rasuvo restart. PA on file, expires 11/3/2023. [facility] patient was filling with [facility].    Outgoing call to patient's wife. She is aware that we are routing the script to [facility]. Let her know that she can contact [facility] if she has any problems in the future.    Closed out of [facility]'s services at this time and routed RX.

## 2023-05-30 DIAGNOSIS — M06.9 RHEUMATOID ARTHRITIS, INVOLVING UNSPECIFIED SITE, UNSPECIFIED WHETHER RHEUMATOID FACTOR PRESENT: ICD-10-CM

## 2023-05-30 RX ORDER — METHOTREXATE 25 MG/.5ML
25 INJECTION, SOLUTION SUBCUTANEOUS
Qty: 4 EACH | Refills: 2 | Status: SHIPPED | OUTPATIENT
Start: 2023-05-30 | End: 2023-05-30 | Stop reason: SDUPTHER

## 2023-05-30 RX ORDER — METHOTREXATE 25 MG/.5ML
25 INJECTION, SOLUTION SUBCUTANEOUS
Qty: 4 EACH | Refills: 2 | Status: ACTIVE | OUTPATIENT
Start: 2023-05-30 | End: 2023-05-31 | Stop reason: SDUPTHER

## 2023-05-31 DIAGNOSIS — M06.9 RHEUMATOID ARTHRITIS, INVOLVING UNSPECIFIED SITE, UNSPECIFIED WHETHER RHEUMATOID FACTOR PRESENT: ICD-10-CM

## 2023-05-31 RX ORDER — METHOTREXATE 25 MG/.5ML
25 INJECTION, SOLUTION SUBCUTANEOUS
Qty: 4 EACH | Refills: 2 | Status: SHIPPED | OUTPATIENT
Start: 2023-05-31 | End: 2023-06-01 | Stop reason: SDUPTHER

## 2023-05-31 NOTE — TELEPHONE ENCOUNTER
"Received a message from [facility] that there was an issue with the Rasuvo being "stuck" from [facility]. [facility] sent rx to [facility] on 5/17. OON. [place] Medicaid. We had another rx for Rasuvo in [facility] Queue. Routed Rasuvo to [facility].  "

## 2023-06-01 DIAGNOSIS — M06.9 RHEUMATOID ARTHRITIS, INVOLVING UNSPECIFIED SITE, UNSPECIFIED WHETHER RHEUMATOID FACTOR PRESENT: ICD-10-CM

## 2023-06-01 RX ORDER — METHOTREXATE 25 MG/.5ML
25 INJECTION, SOLUTION SUBCUTANEOUS
Qty: 4 EACH | Refills: 2 | Status: SHIPPED | OUTPATIENT
Start: 2023-06-01 | End: 2023-06-05 | Stop reason: SDUPTHER

## 2023-06-01 NOTE — TELEPHONE ENCOUNTER
----- Message from Nelsy Gardner PharmD sent at 6/1/2023  3:23 PM CDT -----  Contact: [phone]  Correct    ----- Message -----  From: Mally Laws MA  Sent: 6/1/2023   1:18 PM CDT  To: Nelsy Gardner PharmD    I do not think so, Dr Mcgovern is going to have to send the prescription correct to that pharmacy?  ----- Message -----  From: Nelsy Gardner PharmD  Sent: 6/1/2023   1:11 PM CDT  To: Mally Laws MA, Roberto Carlos Tamez MD    We checked with [place] Medicaid - PA is still good that is on file until November. There is really nothing more we can do here at [facility]. I also spoke with Lara who states it is still rejecting. Is Dr. Tamez a [place] Medicaid covered provider? That could be the issue.     ----- Message -----  From: Mally Laws MA  Sent: 6/1/2023   1:06 PM CDT  To: Nelsy Gardner PharmD, Roberto Carlos Tamez MD      ----- Message -----  From: Gloria Arceo MA  Sent: 6/1/2023  12:58 PM CDT  To: Shaniqua Bermudez from [facility] is calling regarding previous message about pt's medication methotrexate, PF, (RASUVO, PF,) 25 mg/0.5 mL AtIn. She states they have been working on this since last week, and would like to know if a different doctor can prescribe this. She states she's not sure of what the hold up is with the Medicaid insurance.  Please reach back out to Lara at [phone].

## 2023-06-05 ENCOUNTER — PATIENT MESSAGE (OUTPATIENT)
Dept: RHEUMATOLOGY | Facility: CLINIC | Age: 53
End: 2023-06-05
Payer: MEDICAID

## 2023-06-05 ENCOUNTER — TELEPHONE (OUTPATIENT)
Dept: RHEUMATOLOGY | Facility: CLINIC | Age: 53
End: 2023-06-05
Payer: MEDICAID

## 2023-06-05 DIAGNOSIS — M06.9 RHEUMATOID ARTHRITIS, INVOLVING UNSPECIFIED SITE, UNSPECIFIED WHETHER RHEUMATOID FACTOR PRESENT: ICD-10-CM

## 2023-06-05 RX ORDER — METHOTREXATE 25 MG/.5ML
25 INJECTION, SOLUTION SUBCUTANEOUS
Qty: 4 EACH | Refills: 2 | Status: ACTIVE | OUTPATIENT
Start: 2023-06-05 | End: 2023-07-13

## 2023-06-07 ENCOUNTER — TELEPHONE (OUTPATIENT)
Dept: RHEUMATOLOGY | Facility: CLINIC | Age: 53
End: 2023-06-07
Payer: MEDICAID

## 2023-06-07 ENCOUNTER — TELEPHONE (OUTPATIENT)
Dept: PHARMACY | Facility: CLINIC | Age: 53
End: 2023-06-07
Payer: MEDICAID

## 2023-06-07 NOTE — TELEPHONE ENCOUNTER
Pharmacist from [facility] contacted [facility] stating that she is having difficulty processing a claim for this patients Rasuvo. Pharmacist inquired about [facility]'s ability to process the Rasuvo on this patients plan. [facility] is out of network so we are unable to process a for a paid claim. Pharmacist states the insurance rejection requires a drug utilization review code override and she cant figure out how to add them in her pharmacy dispensing system. Advised pharmacist to reach out to the pharmacy help desk for insurance plan or her in house team for further advice   
murmur loudness: III/VI

## 2023-06-07 NOTE — TELEPHONE ENCOUNTER
----- Message from Jeremías Mcgovern MD sent at 6/6/2023  3:43 PM CDT -----  Regarding: RE: call back  Mally, he has no evidence of ILD on CT chest, and has only fatty liver with normal liver tests. Please ask them to fill the Rasuvo 25mg sc once a week  with folic acid 1mg daily as ordered. Thank you RJQ  ----- Message -----  From: Mally Laws MA  Sent: 6/6/2023   2:31 PM CDT  To: Jeremías Mcgovern MD, Roberto Carlos Tamez MD  Subject: FW: call back                                      ----- Message -----  From: Mercedes Noreen  Sent: 6/6/2023   2:28 PM CDT  To: Shaniqua MAHAJAN Staff  Subject: call back                                        Name of Who is Calling: [employer]       What is the request in detail: Pharmacy is needing to know if the provider actually wants the pt to take the methotrexate, PF, (RASUVO, PF,) 25 mg/0.5 mL AtIn. Pt has a lung disease & severe hepatic disease. It is causing a DUR when they process the claim. It's not going through because of the two diseases that the pt have.       Can the clinic reply by [facility]: no       What Number to Call Back if not in [facility]: [phone]

## 2023-06-07 NOTE — TELEPHONE ENCOUNTER
he has no evidence of ILD on CT chest, and has only fatty liver with normal liver tests. Please ask them to fill the Rasuvo 25mg sc once a week  with folic acid 1mg daily as ordered. Thank you MARKOS     Spoke with the pharmacy and will push through for the approval and get back if denied

## 2023-06-12 ENCOUNTER — OFFICE VISIT (OUTPATIENT)
Dept: TRANSPLANT | Facility: CLINIC | Age: 53
End: 2023-06-12
Payer: MEDICAID

## 2023-06-12 ENCOUNTER — HOSPITAL ENCOUNTER (OUTPATIENT)
Dept: PULMONOLOGY | Facility: CLINIC | Age: 53
Discharge: HOME | End: 2023-06-12
Payer: MEDICAID

## 2023-06-12 ENCOUNTER — OFFICE VISIT (OUTPATIENT)
Dept: RHEUMATOLOGY | Facility: CLINIC | Age: 53
End: 2023-06-12
Attending: INTERNAL MEDICINE
Payer: MEDICAID

## 2023-06-12 ENCOUNTER — HOSPITAL ENCOUNTER (OUTPATIENT)
Dept: CARDIOLOGY | Facility: HOSPITAL | Age: 53
Discharge: HOME | End: 2023-06-12
Attending: INTERNAL MEDICINE
Payer: MEDICAID

## 2023-06-12 VITALS
WEIGHT: 243 LBS | DIASTOLIC BLOOD PRESSURE: 80 MMHG | HEIGHT: 66 IN | BODY MASS INDEX: 39.05 KG/M2 | HEART RATE: 60 BPM | SYSTOLIC BLOOD PRESSURE: 130 MMHG

## 2023-06-12 VITALS
WEIGHT: 233.69 LBS | BODY MASS INDEX: 37.56 KG/M2 | SYSTOLIC BLOOD PRESSURE: 115 MMHG | DIASTOLIC BLOOD PRESSURE: 74 MMHG | HEIGHT: 66 IN | HEART RATE: 83 BPM

## 2023-06-12 VITALS
WEIGHT: 242.5 LBS | OXYGEN SATURATION: 96 % | BODY MASS INDEX: 38.97 KG/M2 | HEART RATE: 78 BPM | SYSTOLIC BLOOD PRESSURE: 121 MMHG | RESPIRATION RATE: 20 BRPM | HEIGHT: 66 IN | DIASTOLIC BLOOD PRESSURE: 78 MMHG | TEMPERATURE: 98 F

## 2023-06-12 DIAGNOSIS — R06.02 SHORTNESS OF BREATH: ICD-10-CM

## 2023-06-12 DIAGNOSIS — M06.9 RHEUMATOID ARTHRITIS, INVOLVING UNSPECIFIED SITE, UNSPECIFIED WHETHER RHEUMATOID FACTOR PRESENT: ICD-10-CM

## 2023-06-12 DIAGNOSIS — M06.9 RHEUMATOID ARTHRITIS INVOLVING MULTIPLE SITES, UNSPECIFIED WHETHER RHEUMATOID FACTOR PRESENT: Primary | ICD-10-CM

## 2023-06-12 DIAGNOSIS — M05.711 RHEUMATOID ARTHRITIS INVOLVING BOTH SHOULDERS WITH POSITIVE RHEUMATOID FACTOR: Primary | ICD-10-CM

## 2023-06-12 DIAGNOSIS — M06.9 RHEUMATOID ARTHRITIS, INVOLVING UNSPECIFIED SITE, UNSPECIFIED WHETHER RHEUMATOID FACTOR PRESENT: Primary | ICD-10-CM

## 2023-06-12 DIAGNOSIS — M05.712 RHEUMATOID ARTHRITIS INVOLVING BOTH SHOULDERS WITH POSITIVE RHEUMATOID FACTOR: Primary | ICD-10-CM

## 2023-06-12 LAB
ASCENDING AORTA: 3.33 CM
AV INDEX (PROSTH): 0.89
AV MEAN GRADIENT: 3 MMHG
AV PEAK GRADIENT: 6 MMHG
AV VALVE AREA: 3.53 CM2
AV VELOCITY RATIO: 0.83
BSA FOR ECHO PROCEDURE: 2.27 M2
CV ECHO LV RWT: 0.38 CM
DLCO ADJ PRE: 24.1 ML/(MIN*MMHG) (ref 20.24–34.09)
DLCO SINGLE BREATH LLN: 20.24
DLCO SINGLE BREATH PRE REF: 92.5 %
DLCO SINGLE BREATH REF: 27.16
DLCOC SBVA LLN: 2.95
DLCOC SBVA PRE REF: 116.6 %
DLCOC SBVA REF: 4.3
DLCOC SINGLE BREATH LLN: 20.24
DLCOC SINGLE BREATH PRE REF: 88.7 %
DLCOC SINGLE BREATH REF: 27.16
DLCOCSBVAULN: 5.65
DLCOCSINGLEBREATHULN: 34.09
DLCOSINGLEBREATHULN: 34.09
DLCOVA LLN: 2.95
DLCOVA PRE REF: 121.6 %
DLCOVA PRE: 5.23 ML/(MIN*MMHG*L) (ref 2.95–5.65)
DLCOVA REF: 4.3
DLCOVAULN: 5.65
DLVAADJ PRE: 5.02 ML/(MIN*MMHG*L) (ref 2.95–5.65)
DOP CALC AO PEAK VEL: 1.21 M/S
DOP CALC AO VTI: 21.86 CM
DOP CALC LVOT AREA: 4 CM2
DOP CALC LVOT DIAMETER: 2.25 CM
DOP CALC LVOT PEAK VEL: 1 M/S
DOP CALC LVOT STROKE VOLUME: 77.26 CM3
DOP CALCLVOT PEAK VEL VTI: 19.44 CM
E WAVE DECELERATION TIME: 191.22 MSEC
E/A RATIO: 1
E/E' RATIO: 6.38 M/S
ECHO LV POSTERIOR WALL: 0.72 CM (ref 0.6–1.1)
EJECTION FRACTION: 60 %
ERV LLN: -16448.82
ERV PRE REF: 66.1 %
ERV REF: 1.18
ERVULN: ABNORMAL
FEF 25 75 LLN: 1.6
FEF 25 75 PRE REF: 163.9 %
FEF 25 75 REF: 3.05
FEV05 LLN: 1.42
FEV05 REF: 2.56
FEV1 FVC LLN: 68
FEV1 FVC PRE REF: 110.5 %
FEV1 FVC REF: 79
FEV1 LLN: 2.58
FEV1 PRE REF: 100.6 %
FEV1 REF: 3.35
FRACTIONAL SHORTENING: 40 % (ref 28–44)
FRCPLETH LLN: 2.32
FRCPLETH PREREF: 56.2 %
FRCPLETH REF: 3.31
FRCPLETHULN: 4.3
FVC LLN: 3.28
FVC PRE REF: 90.9 %
FVC REF: 4.23
INTERVENTRICULAR SEPTUM: 0.82 CM (ref 0.6–1.1)
IVC PRE: 3.9 L (ref 3.28–5.19)
IVC SINGLE BREATH LLN: 3.28
IVC SINGLE BREATH PRE REF: 92.3 %
IVC SINGLE BREATH REF: 4.23
IVCSINGLEBREATHULN: 5.19
IVRT: 88.49 MSEC
LA MAJOR: 4.88 CM
LA MINOR: 4.55 CM
LA WIDTH: 3.68 CM
LEFT ATRIUM SIZE: 3.59 CM
LEFT ATRIUM VOLUME INDEX MOD: 22.1 ML/M2
LEFT ATRIUM VOLUME INDEX: 24.4 ML/M2
LEFT ATRIUM VOLUME MOD: 47.92 CM3
LEFT ATRIUM VOLUME: 52.88 CM3
LEFT INTERNAL DIMENSION IN SYSTOLE: 2.26 CM (ref 2.1–4)
LEFT VENTRICLE DIASTOLIC VOLUME INDEX: 28.37 ML/M2
LEFT VENTRICLE DIASTOLIC VOLUME: 61.57 ML
LEFT VENTRICLE MASS INDEX: 37 G/M2
LEFT VENTRICLE SYSTOLIC VOLUME INDEX: 8 ML/M2
LEFT VENTRICLE SYSTOLIC VOLUME: 17.38 ML
LEFT VENTRICULAR INTERNAL DIMENSION IN DIASTOLE: 3.79 CM (ref 3.5–6)
LEFT VENTRICULAR MASS: 81.29 G
LLN IC: -9999997.09
LV LATERAL E/E' RATIO: 5.67 M/S
LV SEPTAL E/E' RATIO: 7.29 M/S
MEP PRE REF: 70 %
MEP PRE: 70.05 CMH2O (ref 83.23–116.78)
MEP REF: 100
MIP PRE REF: 107 %
MIP PRE: 79.98 CMH2O (ref 58.23–91.78)
MIP REF: 75
MV A" WAVE DURATION": 14.56 MSEC
MV PEAK A VEL: 0.51 M/S
MV PEAK E VEL: 0.51 M/S
MV STENOSIS PRESSURE HALF TIME: 55.45 MS
MV VALVE AREA P 1/2 METHOD: 3.97 CM2
MVV LLN: 108
MVV PRE REF: 99.5 %
MVV REF: 127
PEF LLN: 6.74
PEF PRE REF: 115.3 %
PEF REF: 8.8
PHYSICIAN COMMENT: ABNORMAL
PISA TR MAX VEL: 2.16 M/S
PRE DLCO: 25.12 ML/(MIN*MMHG) (ref 20.24–34.09)
PRE ERV: 0.78 L (ref -16448.82–16451.18)
PRE FEF 25 75: 5 L/S (ref 1.6–4.97)
PRE FET 100: 6.39 SEC
PRE FEV05 REF: 113.8 %
PRE FEV1 FVC: 87.6 % (ref 67.89–89.05)
PRE FEV1: 3.37 L (ref 2.58–4.07)
PRE FEV5: 2.91 L (ref 1.42–3.69)
PRE FRC PL: 1.86 L (ref 2.32–4.3)
PRE FVC: 3.84 L (ref 3.28–5.19)
PRE IC: 3.06 L (ref -9999997.09–#######.####)
PRE MVV: 126.62 L/MIN (ref 108.17–146.35)
PRE PEF: 10.14 L/S (ref 6.74–10.86)
PRE REF IC: 105.2 %
PRE RV: 1.08 L (ref 1.46–2.81)
PRE TLC: 4.92 L (ref 5.16–7.47)
PULM VEIN S/D RATIO: 1.31
PV PEAK D VEL: 0.35 M/S
PV PEAK S VEL: 0.46 M/S
RA MAJOR: 3.61 CM
RA PRESSURE: 3 MMHG
RA WIDTH: 3.39 CM
RAW PRE REF: 113.4 %
RAW PRE: 3.47 CMH2O*S/L (ref 3.06–3.06)
RAW REF: 3.06
REF IC: 2.91
RIGHT VENTRICULAR END-DIASTOLIC DIMENSION: 3.63 CM
RV LLN: 1.46
RV PRE REF: 50.7 %
RV REF: 2.13
RVTLC LLN: 26
RVTLC PRE REF: 63.4 %
RVTLC PRE: 21.95 % (ref 25.65–43.61)
RVTLC REF: 35
RVTLCULN: 44
RVULN: 2.81
SGAW PRE REF: 134.3 %
SGAW PRE: 0.11 1/(CMH2O*S) (ref 0.08–0.08)
SGAW REF: 0.08
SINUS: 3.48 CM
STJ: 2.91 CM
TDI LATERAL: 0.09 M/S
TDI SEPTAL: 0.07 M/S
TDI: 0.08 M/S
TLC LLN: 5.16
TLC PRE REF: 78 %
TLC REF: 6.31
TLC ULN: 7.47
TR MAX PG: 19 MMHG
TRICUSPID ANNULAR PLANE SYSTOLIC EXCURSION: 2.13 CM
TV REST PULMONARY ARTERY PRESSURE: 22 MMHG
ULN IC: ABNORMAL
VA PRE: 4.8 L (ref 6.16–6.16)
VA SINGLE BREATH LLN: 6.16
VA SINGLE BREATH PRE REF: 77.9 %
VA SINGLE BREATH REF: 6.16
VASINGLEBREATHULN: 6.16
VC LLN: 3.28
VC PRE REF: 90.9 %
VC PRE: 3.84 L (ref 3.28–5.19)
VC REF: 4.23
VC ULN: 5.19

## 2023-06-12 PROCEDURE — 3008F PR BODY MASS INDEX (BMI) DOCUMENTED: ICD-10-PCS | Mod: CPTII,NTX,, | Performed by: INTERNAL MEDICINE

## 2023-06-12 PROCEDURE — 3074F PR MOST RECENT SYSTOLIC BLOOD PRESSURE < 130 MM HG: ICD-10-PCS | Mod: CPTII,NTX,, | Performed by: INTERNAL MEDICINE

## 2023-06-12 PROCEDURE — 99214 PR OFFICE/OUTPT VISIT, EST, LEVL IV, 30-39 MIN: ICD-10-PCS | Mod: S$PBB,NTX,, | Performed by: INTERNAL MEDICINE

## 2023-06-12 PROCEDURE — 99214 OFFICE O/P EST MOD 30 MIN: CPT | Mod: 25,S$PBB,NTX, | Performed by: INTERNAL MEDICINE

## 2023-06-12 PROCEDURE — 94010 BREATHING CAPACITY TEST: ICD-10-PCS | Mod: 26,S$PBB,NTX, | Performed by: INTERNAL MEDICINE

## 2023-06-12 PROCEDURE — 99214 OFFICE O/P EST MOD 30 MIN: CPT | Mod: S$PBB,NTX,, | Performed by: INTERNAL MEDICINE

## 2023-06-12 PROCEDURE — 94726 PLETHYSMOGRAPHY LUNG VOLUMES: CPT | Mod: 26,S$PBB,NTX, | Performed by: INTERNAL MEDICINE

## 2023-06-12 PROCEDURE — 94729 DIFFUSING CAPACITY: CPT | Mod: PBBFAC,NTX | Performed by: INTERNAL MEDICINE

## 2023-06-12 PROCEDURE — 1159F MED LIST DOCD IN RCRD: CPT | Mod: CPTII,NTX,, | Performed by: INTERNAL MEDICINE

## 2023-06-12 PROCEDURE — 94010 BREATHING CAPACITY TEST: CPT | Mod: 26,S$PBB,NTX, | Performed by: INTERNAL MEDICINE

## 2023-06-12 PROCEDURE — 36600 WITHDRAWAL OF ARTERIAL BLOOD: CPT | Mod: 53,PBBFAC,NTX | Performed by: INTERNAL MEDICINE

## 2023-06-12 PROCEDURE — 3074F SYST BP LT 130 MM HG: CPT | Mod: CPTII,NTX,, | Performed by: INTERNAL MEDICINE

## 2023-06-12 PROCEDURE — 99214 OFFICE O/P EST MOD 30 MIN: CPT | Mod: PBBFAC,25,NTX | Performed by: INTERNAL MEDICINE

## 2023-06-12 PROCEDURE — 3078F PR MOST RECENT DIASTOLIC BLOOD PRESSURE < 80 MM HG: ICD-10-PCS | Mod: CPTII,NTX,, | Performed by: INTERNAL MEDICINE

## 2023-06-12 PROCEDURE — 1160F RVW MEDS BY RX/DR IN RCRD: CPT | Mod: CPTII,NTX,, | Performed by: INTERNAL MEDICINE

## 2023-06-12 PROCEDURE — 99999 PR PBB SHADOW E&M-EST. PATIENT-LVL IV: ICD-10-PCS | Mod: PBBFAC,TXP,, | Performed by: INTERNAL MEDICINE

## 2023-06-12 PROCEDURE — 93306 TTE W/DOPPLER COMPLETE: CPT | Mod: 26,NTX,, | Performed by: INTERNAL MEDICINE

## 2023-06-12 PROCEDURE — 99999 PR PBB SHADOW E&M-EST. PATIENT-LVL IV: CPT | Mod: PBBFAC,TXP,, | Performed by: INTERNAL MEDICINE

## 2023-06-12 PROCEDURE — 94726 PLETHYSMOGRAPHY LUNG VOLUMES: CPT | Mod: PBBFAC,NTX | Performed by: INTERNAL MEDICINE

## 2023-06-12 PROCEDURE — 1159F PR MEDICATION LIST DOCUMENTED IN MEDICAL RECORD: ICD-10-PCS | Mod: CPTII,NTX,, | Performed by: INTERNAL MEDICINE

## 2023-06-12 PROCEDURE — 3008F BODY MASS INDEX DOCD: CPT | Mod: CPTII,NTX,, | Performed by: INTERNAL MEDICINE

## 2023-06-12 PROCEDURE — 3078F DIAST BP <80 MM HG: CPT | Mod: CPTII,NTX,, | Performed by: INTERNAL MEDICINE

## 2023-06-12 PROCEDURE — 94729 DIFFUSING CAPACITY: CPT | Mod: 26,S$PBB,NTX, | Performed by: INTERNAL MEDICINE

## 2023-06-12 PROCEDURE — 93306 TTE W/DOPPLER COMPLETE: CPT | Mod: TXP

## 2023-06-12 PROCEDURE — 1160F PR REVIEW ALL MEDS BY PRESCRIBER/CLIN PHARMACIST DOCUMENTED: ICD-10-PCS | Mod: CPTII,NTX,, | Performed by: INTERNAL MEDICINE

## 2023-06-12 PROCEDURE — 94729 PR C02/MEMBANE DIFFUSE CAPACITY: ICD-10-PCS | Mod: 26,S$PBB,NTX, | Performed by: INTERNAL MEDICINE

## 2023-06-12 PROCEDURE — 99214 PR OFFICE/OUTPT VISIT, EST, LEVL IV, 30-39 MIN: ICD-10-PCS | Mod: 25,S$PBB,NTX, | Performed by: INTERNAL MEDICINE

## 2023-06-12 PROCEDURE — 94010 BREATHING CAPACITY TEST: CPT | Mod: PBBFAC,NTX | Performed by: INTERNAL MEDICINE

## 2023-06-12 PROCEDURE — 93306 ECHO (CUPID ONLY): ICD-10-PCS | Mod: 26,NTX,, | Performed by: INTERNAL MEDICINE

## 2023-06-12 PROCEDURE — 94726 PULM FUNCT TST PLETHYSMOGRAP: ICD-10-PCS | Mod: 26,S$PBB,NTX, | Performed by: INTERNAL MEDICINE

## 2023-06-12 PROCEDURE — 36600 PR WITHDRAWAL OF ARTERIAL BLOOD: ICD-10-PCS | Mod: 53,S$PBB,NTX, | Performed by: INTERNAL MEDICINE

## 2023-06-12 PROCEDURE — 99214 OFFICE O/P EST MOD 30 MIN: CPT | Mod: PBBFAC,25,27,NTX | Performed by: INTERNAL MEDICINE

## 2023-06-12 PROCEDURE — 36600 WITHDRAWAL OF ARTERIAL BLOOD: CPT | Mod: 53,S$PBB,NTX, | Performed by: INTERNAL MEDICINE

## 2023-06-12 RX ORDER — SODIUM CHLORIDE 0.9 % (FLUSH) 0.9 %
10 SYRINGE (ML) INJECTION
OUTPATIENT
Start: 2023-08-24

## 2023-06-12 RX ORDER — METHOTREXATE 25 MG/ML
25 INJECTION, SOLUTION INTRA-ARTERIAL; INTRAMUSCULAR; INTRAVENOUS
Qty: 2 ML | Refills: 5 | Status: SHIPPED | OUTPATIENT
Start: 2023-06-12 | End: 2023-07-13

## 2023-06-12 RX ORDER — METHOTREXATE 25 MG/ML
25 INJECTION, SOLUTION INTRA-ARTERIAL; INTRAMUSCULAR; INTRAVENOUS
Qty: 2 ML | Refills: 5 | Status: SHIPPED | OUTPATIENT
Start: 2023-06-12 | End: 2023-06-12 | Stop reason: SDUPTHER

## 2023-06-12 RX ORDER — FAMOTIDINE 10 MG/ML
20 INJECTION INTRAVENOUS
OUTPATIENT
Start: 2023-08-24

## 2023-06-12 RX ORDER — HEPARIN 100 UNIT/ML
500 SYRINGE INTRAVENOUS
OUTPATIENT
Start: 2023-08-24

## 2023-06-12 RX ORDER — ACETAMINOPHEN 325 MG/1
650 TABLET ORAL
OUTPATIENT
Start: 2023-08-24

## 2023-06-12 NOTE — PROGRESS NOTES
ADVANCED LUNG DISEASE CLINIC FOLLOW UP                                                                                                                                          Reason for Visit:  Evaluation     Referring Physician: Kobe Lemos MD    History of Present Illness: Joaquín Rod is a 52 y.o. male who is on 0L of oxygen.  He is on CPAP at nighttime only for MARTI.  His New York Heart Association Class is I and a Karnofsky score of 80% - Normal activity with effort: some symptoms of disease. He is not diabetic.    Patient presents today for routine follow up. 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 Patient resumed MTX and was started on rituximab since his initial visit. States he is primarily limited by his diffuse joint pains. Denies dyspnea today. Only respiratory complaint is orthopnea. Compliant with CPAP. Overall doing well. No recent hospitalizations/exacerbations.       Past Medical History:   Diagnosis Date    HLD (hyperlipidemia)     HTN (hypertension)     MARTI on CPAP     Rheumatoid arthritis, unspecified        No past surgical history on file.    Allergies: Patient has no known allergies.    Current Outpatient Medications   Medication Sig    amLODIPine (NORVASC) 10 MG tablet amlodipine 10 mg tablet   TAKE 1 TABLET BY MOUTH EVERY DAY    atorvastatin (LIPITOR) 20 MG tablet atorvastatin 20 mg tablet   TAKE 1 TABLET BY MOUTH EVERY DAY    budesonide (PULMICORT) 0.5 mg/2 mL nebulizer solution 2 mLs.    budesonide 1 mg/2 mL [facility] budesonide 1 mg/2 mL suspension for nebulization   INHALE 2 ML TWICE A DAY BY NEBULIZATION ROUTE AS NEEDED.    busPIRone (BUSPAR) 10 MG tablet Take 10 mg by mouth 2 (two) times daily.    diazePAM (VALIUM) 5 MG tablet Take 1 tablet (5 mg total) by mouth every 6 (six) hours as needed for Anxiety.    fluticasone propionate (FLONASE) 50 mcg/actuation nasal spray fluticasone propionate 50 mcg/actuation nasal spray,suspension   SPRAY 2 SPRAYS INTO EACH  NOSTRIL EVERY DAY    folic acid (FOLVITE) 1 MG tablet Take 1 tablet (1 mg total) by mouth once daily.    leflunomide (ARAVA) 20 MG Tab Take 1 tablet (20 mg total) by mouth once daily.    methotrexate, PF, (RASUVO, PF,) 25 mg/0.5 mL AtIn Inject 0.5 mLs (25 mg total) into the skin every 7 days.    predniSONE (DELTASONE) 5 MG tablet Take 2 tablets (10 mg total) by mouth once daily.    sulfamethoxazole-trimethoprim 400-80mg (BACTRIM,SEPTRA) 400-80 mg per tablet Take 1 tablet by mouth every Mon, Wed, Fri.    telmisartan-hydrochlorothiazide (MICARDIS HCT) 80-25 mg per tablet telmisartan 80 mg-hydrochlorothiazide 25 mg tablet   TAKE 1 TABLET BY MOUTH EVERY DAY    venlafaxine (EFFEXOR) 37.5 MG Tab Take 37.5 mg by mouth.     No current facility-administered medications for this visit.       Immunization History   Administered Date(s) Administered    COVID-19 Vaccine 03/22/2021, 04/09/2021    COVID-19, MRNA, LN-S, PF (Pfizer) (Purple Cap) 03/22/2021, 04/09/2021    COVID-19, mRNA, LNP-S, bivalent booster, PF (PFIZER OMICRON) 11/14/2022    Influenza 10/08/2017, 11/13/2018, 09/23/2019    Influenza - Quadrivalent 11/13/2018, 09/23/2019, 09/22/2020, 10/25/2021, 09/29/2022    Influenza - Trivalent (ADULT) 11/06/2015, 12/14/2016    Influenza - Trivalent - PF (ADULT) 11/29/2006, 10/24/2008    MMR 05/24/2013    Pneumococcal Polysaccharide - 23 Valent 10/25/2021    Tdap 11/28/2017     Family History:  No family history on file.  Social History     Substance and Sexual Activity   Alcohol Use Never      Social History     Substance and Sexual Activity   Drug Use Never      Social History     Socioeconomic History    Marital status:    Tobacco Use    Smoking status: Never     Passive exposure: Never    Smokeless tobacco: Current     Types: Chew   Substance and Sexual Activity    Alcohol use: Never    Drug use: Never     Review of Systems   Constitutional:  Negative for chills, fever and weight loss.   Respiratory:  Positive for  "shortness of breath. Negative for cough, hemoptysis, sputum production and wheezing.    Cardiovascular:  Positive for leg swelling. Negative for chest pain, orthopnea and PND.   Gastrointestinal:  Negative for abdominal pain, constipation, diarrhea, nausea and vomiting.   Musculoskeletal:  Positive for joint pain and myalgias.   Neurological: Negative.    Psychiatric/Behavioral: Negative.     Vitals  /78   Pulse 78   Temp 97.8 °F (36.6 °C) (Oral)   Resp 20   Ht 5' 6" (1.676 m)   Wt 110 kg (242 lb 8.1 oz)   SpO2 96%   BMI 39.14 kg/m²   Physical Exam  Constitutional:       General: He is not in acute distress.     Appearance: Normal appearance.   HENT:      Head: Normocephalic and atraumatic.   Eyes:      Extraocular Movements: Extraocular movements intact.   Cardiovascular:      Rate and Rhythm: Normal rate and regular rhythm.      Heart sounds: Normal heart sounds. No murmur heard.    No friction rub. No gallop.   Pulmonary:      Effort: Pulmonary effort is normal. No respiratory distress.      Breath sounds: Normal breath sounds. No wheezing or rales.   Abdominal:      Palpations: Abdomen is soft.      Tenderness: There is no abdominal tenderness.   Musculoskeletal:         General: Swelling (2+ pitting edema BLE bilaterally) present. Normal range of motion.      Cervical back: Normal range of motion and neck supple.   Skin:     General: Skin is warm and dry.   Neurological:      General: No focal deficit present.      Mental Status: He is alert and oriented to person, place, and time.      Cranial Nerves: No cranial nerve deficit.   Psychiatric:         Mood and Affect: Mood normal.         Judgment: Judgment normal.       Labs:  Hospital Outpatient Visit on 06/12/2023   Component Date Value    BSA 06/12/2023 2.27     TDI SEPTAL 06/12/2023 0.07     LV LATERAL E/E' RATIO 06/12/2023 5.67     LV SEPTAL E/E' RATIO 06/12/2023 7.29     LA WIDTH 06/12/2023 3.68     TDI LATERAL 06/12/2023 0.09     LVIDd " "06/12/2023 3.79     IVS 06/12/2023 0.82     Posterior Wall 06/12/2023 0.72     LVIDs 06/12/2023 2.26     FS 06/12/2023 40     LA volume 06/12/2023 52.88     Sinus 06/12/2023 3.48     STJ 06/12/2023 2.91     Ascending aorta 06/12/2023 3.33     LV mass 06/12/2023 81.29     LA size 06/12/2023 3.59     RVDD 06/12/2023 3.63     TAPSE 06/12/2023 2.13     Left Ventricle Relative * 06/12/2023 0.38     AV mean gradient 06/12/2023 3     AV valve area 06/12/2023 3.53     AV Velocity Ratio 06/12/2023 0.83     AV index (prosthetic) 06/12/2023 0.89     MV valve area p 1/2 meth* 06/12/2023 3.97     E/A ratio 06/12/2023 1.00     Mean e' 06/12/2023 0.08     E wave deceleration time 06/12/2023 191.22     IVRT 06/12/2023 88.49     MV "A" wave duration 06/12/2023 14.56     Pulm vein S/D ratio 06/12/2023 1.31     LVOT diameter 06/12/2023 2.25     LVOT area 06/12/2023 4.0     LVOT peak colton 06/12/2023 1.00     LVOT peak VTI 06/12/2023 19.44     Ao peak colton 06/12/2023 1.21     Ao VTI 06/12/2023 21.86     LVOT stroke volume 06/12/2023 77.26     AV peak gradient 06/12/2023 6     E/E' ratio 06/12/2023 6.38     MV Peak E Colton 06/12/2023 0.51     TR Max Colton 06/12/2023 2.16     MV stenosis pressure 1/2* 06/12/2023 55.45     MV Peak A Colton 06/12/2023 0.51     PV Peak S Colton 06/12/2023 0.46     PV Peak D Colton 06/12/2023 0.35     LV Systolic Volume 06/12/2023 17.38     LV Systolic Volume Index 06/12/2023 8.0     LV Diastolic Volume 06/12/2023 61.57     LV Diastolic Volume Index 06/12/2023 28.37     LA Volume Index 06/12/2023 24.4     LV Mass Index 06/12/2023 37     RA Major Axis 06/12/2023 3.61     Left Atrium Minor Axis 06/12/2023 4.55     Left Atrium Major Axis 06/12/2023 4.88     Triscuspid Valve Regurgi* 06/12/2023 19     LA Volume Index (Mod) 06/12/2023 22.1     LA volume (mod) 06/12/2023 47.92     RA Width 06/12/2023 3.39     Right Atrial Pressure (f* 06/12/2023 3     EF 06/12/2023 60     TV rest pulmonary artery* 06/12/2023 22    Lab Visit on " 06/12/2023   Component Date Value    WBC 06/12/2023 7.20     RBC 06/12/2023 5.37     Hemoglobin 06/12/2023 16.2     Hematocrit 06/12/2023 49.0     MCV 06/12/2023 91     MCH 06/12/2023 30.2     MCHC 06/12/2023 33.1     RDW 06/12/2023 14.2     Platelets 06/12/2023 202     MPV 06/12/2023 12.6     Immature Granulocytes 06/12/2023 0.3     Gran # (ANC) 06/12/2023 4.9     Immature Grans (Abs) 06/12/2023 0.02     Lymph # 06/12/2023 1.1     Mono # 06/12/2023 1.0     Eos # 06/12/2023 0.1     Baso # 06/12/2023 0.06     nRBC 06/12/2023 0     Gran % 06/12/2023 68.0     Lymph % 06/12/2023 15.3 (L)     Mono % 06/12/2023 14.3     Eosinophil % 06/12/2023 1.3     Basophil % 06/12/2023 0.8     Differential Method 06/12/2023 Automated     Sodium 06/12/2023 139     Potassium 06/12/2023 4.1     Chloride 06/12/2023 102     CO2 06/12/2023 28     Glucose 06/12/2023 112 (H)     BUN 06/12/2023 11     Creatinine 06/12/2023 0.9     Calcium 06/12/2023 9.7     Total Protein 06/12/2023 7.3     Albumin 06/12/2023 4.1     Total Bilirubin 06/12/2023 1.3 (H)     Alkaline Phosphatase 06/12/2023 72     AST 06/12/2023 25     ALT 06/12/2023 27     Anion Gap 06/12/2023 9     eGFR 06/12/2023 >60.0     Sed Rate 06/12/2023 <2     CRP 06/12/2023 0.7        Pulmonary Function Tests 6/12/2023 2/2/2023   FVC 3.84 3.19   FEV1 3.37 2.73   TLC (liters) 4.92 4.5   DLCO (ml/mmHg sec) 25.12 20.54   FVC% 90.9 75.4   FEV1% 100.6 81.3   FEF 25-75 5 3.66   FEF 25-75% 163.9 119.2   TLC% 78 71.3   RV 1.08 0.9   RV% 50.7 42.2   DLCO% 92.5 75.6     6MW 2/2/2023 8/17/2022   6MWT Status completed without stopping completed without stopping   Patient Reported Dyspnea;Leg pain Dyspnea   Was O2 used? No No   6MW Distance walked (feet) 732 800   Distance walked (meters) 223.11 243.84   Did patient stop? No No   Oxygen Saturation 97 96   Supplemental Oxygen Room Air Room Air   Heart Rate 100 94   Blood Pressure 129/74 122/70   Hu Dyspnea Rating  very light moderate   Oxygen  Saturation 97 96   Supplemental Oxygen Room Air Room Air   Heart Rate 131 116   Blood Pressure 130/77 119/72   Hu Dyspnea Rating  heavy heavy   Recovery Time (seconds) 142 72   Oxygen Saturation 98 96   Supplemental Oxygen Room Air Room Air   Heart Rate 110 104       Imaging:  Results for orders placed during the hospital encounter of 03/12/21    X-Ray Chest PA And Lateral    Narrative  EXAMINATION:  XR CHEST PA AND LATERAL    CLINICAL HISTORY:  Rheumatoid arthritis, unspecified    TECHNIQUE:  PA and lateral views of the chest were performed.    COMPARISON:  None    FINDINGS:  Cardiac size is normal.  Lungs are clear and no infiltrate is seen.    Impression  See above      Electronically signed by: Joaquín Caldera MD  Date:    03/12/2021  Time:    11:18    CT Chest Without Contrast 10/07/2022    Narrative  EXAMINATION:  CT CHEST WITHOUT CONTRAST    CLINICAL HISTORY:  Interstitial lung disease;ild; Interstitial pulmonary disease, unspecified    TECHNIQUE:  Low dose axial images, sagittal and coronal reformations were obtained from the thoracic inlet to the lung bases. Contrast was not administered.  Inspiratory, expiratory, and prone images were obtained.    COMPARISON:  CT chest 04/14/2021    FINDINGS:  Lungs/Pleura: Clear lungs.  No focal opacity or mass.  No pleural effusion or thickening.  Expiratory views demonstrate no evidence of air trapping.  The prone views are noncontributory.    Base of Neck: Unremarkable.    Thoracic soft tissues: Within normal limits.    Esophagus: Normal.    Airways: Patent.    Aorta: Left-sided aortic arch.  No aneurysm and no significant atherosclerosis.    Heart: Normal size. No effusion.    Pulmonary vasculature: Unremarkable.    Mercedez/Mediastinum: No pathologic amanda enlargement.    Upper Abdomen: There is a 6 cm hypodense hepatic lesion with adjacent smaller hypodensities, similar to prior, likely hepatic cysts.    Bones: Within normal limits for age.    Impression  1.  No CT  evidence for interstitial pneumonia.  2. circumscribed hypodense lesion within the liver, stable in consistent with hepatic cysts versus hemangiomas.    Electronically signed by resident: Ingrid Hernandez  Date:    10/07/2022  Time:    13:50    Electronically signed by: Elsi Segal  Date:    10/07/2022  Time:    17:15     Cardiodiagnostics:  Results for orders placed during the hospital encounter of 09/28/22    Echo    Interpretation Summary  · The left ventricle is normal in size with normal systolic function.  · The estimated ejection fraction is 65%.  · Normal left ventricular diastolic function.  · Normal right ventricular size with normal right ventricular systolic function.        Assessment:  1. Rheumatoid arthritis, involving unspecified site, unspecified whether rheumatoid factor present      Plan:     CT chest without evidence of ILD. FVC and DLCO improved from previous. Continue current management for RA with MTX and rituximab.     RTC in 1 year or sooner if needed.         Jessi Dias   06/12/2023

## 2023-06-12 NOTE — LETTER
June 12, 2023        Roberto Carlos Tamez  [address]  [facility] [address]  Phone: [phone]  Fax: [phone]             [facility]  [address]  [facility] [address]  Phone: [phone]   Patient: Joaquín Rod   MR Number: [id number]   YOB: 1970   Date of Visit: 6/12/2023       Dear Dr. Roberto Carlos Tamez    Thank you for referring Joaquín Rod to me for evaluation. Attached you will find relevant portions of my assessment and plan of care.    If you have questions, please do not hesitate to call me. I look forward to following Joaquín Rod along with you.    Sincerely,    Kobe Lemos MD    Enclosure    If you would like to receive this communication electronically, please contact [email or web address] or [phone] to request [employer] access.    [employer] is a tool which provides read-only access to select patient information with whom you have a relationship. Its easy to use and provides real time access to review your patients record including encounter summaries, notes, results, and demographic information.    If you feel you have received this communication in error or would no longer like to receive these types of communications, please e-mail [email or web address]

## 2023-06-12 NOTE — PROGRESS NOTES
Unable to obtain ABG x2 attempts by two respiratory therapists. Patient refused additional attempts. WALTER

## 2023-06-13 NOTE — PROGRESS NOTES
"Subjective:      Patient ID: Joaquín Rod is a 52 y.o. male.    Chief Complaint: Disease Management    52 year old man with PMH Seronegative RA with positive Anti CarP, HTN, HLD, SOB presents for follow up. Patient managed on Rituxan, Arava 20, Prednisone 10 mg daily. Patient was started on Rituxan and received this medication 2/24 and 3/10/23. He reports significant initial relief, Unfortunately he now continues with joint pain, swelling, and stiffness throughout his body leading to difficulties cleaning, bathing, dressing himself etc. SOB improved at this time and recent PFTs improved as well. He follows with Dr. Oshea. Patient has had difficulties obtaining MTX, which was restarted last visit. He took leftover MTX x 4 wks with some relief, but has had worsened pain since running out of this med 2 wks ago.   Review of Systems   Constitutional:  Positive for fatigue. Negative for activity change, appetite change, chills and fever.   Respiratory:  Positive for shortness of breath (inproved). Negative for apnea, cough, choking, chest tightness, wheezing and stridor.    Cardiovascular:  Negative for chest pain, palpitations and leg swelling.   Gastrointestinal:  Negative for abdominal distention, abdominal pain, constipation, diarrhea and vomiting.   Musculoskeletal:  Positive for arthralgias, gait problem and joint swelling. Negative for back pain, neck pain and neck stiffness.   Skin:  Negative for color change, pallor, rash and wound.   Neurological:  Negative for dizziness, facial asymmetry, light-headedness and headaches.      Objective:   /74   Pulse 83   Ht 5' 6" (1.676 m)   Wt 106 kg (233 lb 11 oz)   BMI 37.72 kg/m²   Physical Exam   Constitutional: normal appearance. He appears obese. No distress. He does not appear ill.   HENT:   Head: Normocephalic and atraumatic.   Nose: Nose normal. No rhinorrhea or nasal congestion.   Mouth/Throat: Mucous membranes are moist. No oropharyngeal exudate or " posterior oropharyngeal erythema. Oropharynx is clear.   Eyes: Pupils are equal, round, and reactive to light. Conjunctivae are normal. Right eye exhibits no discharge. Left eye exhibits no discharge. No scleral icterus.   Neck: Carotid bruit is not present.   Cardiovascular: Normal rate, regular rhythm, normal heart sounds and normal pulses. Exam reveals no gallop and no friction rub.   No murmur heard.  Pulmonary/Chest: Effort normal and breath sounds normal. No stridor. No respiratory distress. He has no wheezes. He has no rhonchi. He has no rales. He exhibits no tenderness.   Abdominal: Bowel sounds are normal. He exhibits no distension and no mass. There is no abdominal tenderness. There is no rebound and no guarding. No hernia.   Musculoskeletal:         General: Swelling and tenderness present. No deformity or signs of injury. Normal range of motion.      Cervical back: Normal range of motion and neck supple. No rigidity or tenderness.      Right lower leg: No edema.      Left lower leg: No edema.   Lymphadenopathy:     He has no cervical adenopathy.   Neurological: He is alert.   Skin: Skin is warm and dry.   Vitals reviewed.     9/28/2022 1/25/2023 5/3/2023 6/12/2023   Tender (JOSEPH-28) 25 / 28 26 / 28 22 / 28 27 / 28    Swollen (JOSEPH-28) 6 / 28 12 / 28 18 / 28 12 / 28    Provider Global 35 mm 80 mm 70 mm 80 mm   Patient Global 70 mm 70 mm 75 mm 85 mm   ESR 23 mm/hr 23 mm/hr 3 mm/hr 2 mm/hr   CRP 1 mg/L 1 mg/L 0.4 mg/L 0.7 mg/L   JOSEPH-28 (ESR) 6.66 (High disease activity) 7 (High disease activity) 5.63 (High disease activity) 5.55 (High disease activity)   JOSEPH-28 (CRP) 5.68 (High disease activity) 6.01 (High disease activity) 5.95 (High disease activity) 6.22 (High disease activity)   CDAI Score 41.5  53  54.5  55.5            Assessment:     Seronegative RF: Patient with positive CarP Ab. He has failed TNF, Orencia, and Actemra. Not a candidate for JAKs at this time given CVD comorbidities. On Arava 20,  Prednisone 10 daily. S/p Rituxan with initial relief, though not long lasting. MTX held due to initial concerns for lung pathology. This was restarted as no longer concerned for MTX induced lung injury. Patient has been unable to obtain this medication.   -Will reinitiate MTX 25 mg inj at this time with daily folate. No longer concerned for MTX induced lung injury. Will re evaluate patient prior to next scheduled rituxan administration and suspect will continue to plan for rituximab q 6 mo at that time. TB/Hep/Rituxan sensitivity ordered prior to next planned administration.   -Check CBC/CMP in 1 month after starting MTX  -continue bactrim for prophylaxis.   -Asked patient to bring in vaccine records from PCP     SOB: HRCT unremarkable. PFTs now improving. SOB improving  -Continue to follow with advanced [facility]     Strongyloides Ab positive: Patient and wife state that he did take recently prescribed ivermectin.      LE weakness: Proximal LE predominantly. Making it difficult for patient to climb stairs. PL12 positive in the past. Aldolase, CK, PL12, and MRIs negative for myositis at this time. Suspect significant contribution from steroids. Encouraged patient to continue to PT exercises.   -Continue to PT/OT exercises.     2 mo follow up    Plan:     Problem List Items Addressed This Visit          Pulmonary    Shortness of breath       Immunology/Multi System    Rheumatoid arthritis - Primary    Relevant Medications    methotrexate 25 mg/mL injection    Other Relevant Orders    CBC W/ AUTO DIFFERENTIAL    COMPREHENSIVE METABOLIC PANEL    Quantiferon Gold TB    Rituxan Sensitivity    IMMUNOGLOBULINS (IGG, IGA, IGM) QUANTITATIVE    HEPATITIS B CORE ANTIBODY, TOTAL    HEPATITIS B SURFACE ANTIGEN    HEPATITIS C ANTIBODY     Roberto Carlos Tamez

## 2023-06-15 NOTE — PROGRESS NOTES
I have personally reviewed the history, confirmed exam findings, and discussed assessment and plan with fellow.       PFTs        FVC     FEV1/FVC     TLC       RV      DLco    6/12/23    90.9       88                 78         50.7     92.5  2/2/23      75.4       85                 71.3      42.2     75.6  8/17/22    83.9       88                 74.9      41.1     89.2  4/14/21    86.5       86                 75         52.1     95       HRCT chest 10/7/22:  Impression:     1.  No CT evidence for interstitial pneumonia.  2. circumscribed hypodense lesion within the liver, stable in consistent with hepatic cysts versus hemangiomas.     Electronically signed by resident: Ingrid Hernandez  Date:                                            10/07/2022  Time:                                           13:50      *saw Dr. Lemos today 6/12/23:    Assessment:  1. Rheumatoid arthritis, involving unspecified site, unspecified whether rheumatoid factor present     Plan:      CT chest without evidence of ILD. FVC and DLCO improved from previous. Continue current management for RA with MTX and rituximab.      RTC in 1 year or sooner if needed.       CPX 10/19/22: Severe functional impairment associated with a normal breathing reserve, normal oxygen stauration, poor effort, and a reduced AT. These findings are indicative of functional impairment secondary to poor effort.  The ECG portion of this study is negative for myocardial ischemia.  There was no ST segment deviation noted during stress.  The test was stopped because the patient experienced fatigue.  The patient's exercise capacity was below average.  There were no arrhythmias during stress     Holter 10/19/22 :Sinus rhythm with rare PACs/PVCs    Date/Time: 2/15/2023 2:15 PM:      Findings  Median liver stiffness score:  10.1  CAP Reading: dB/m:  380     IQR/med %:  13  Interpretation  Fibrosis interpretation is based on medial liver stiffness - Kilopascal (kPa).      Fibrosis Stage:  F3  Steatosis interpretation is based on controlled attenuation parameter - (dB/m).     Steatosis Grade:  S3  Comments/Plan:  F2-F3 fibrosis      csDMARDs:  Methotrexate 25mg sc once a week since onset but held with concern for methotrexate contribution to restrictive lung disease but CT chest negative but never resumed  Leflunomide 20mg daily continued  Prednisone 10mg daily has not been able to taper     Etanercept: ineffective  Adalimumab: ineffective  Abatacept:Ineffective  Tocilizumab: minimally effective  Rituximab 1000mg IV 2/24/23 and 3/10/23, only 2 weeks improvement, now back to baseline     Jakinibs: risk: obesity   The 10-year ASCVD risk score (Lucita DK, et al., 2019) is: 5.1%    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 Lemos in [facility]  Intermittently elevated CK, normal myositis autoantibodies, normal MRI thighs  NAFLD with apparent stage 2-3 fibrosis by fibroscan  followed in [facility]  S/p Strongyloides Rx with ivermectin       *Will discuss with [facility] about whether save to continue methotrexate long term given the fibroscan findings  For now, as Rasuvo not covered, decided to Rx methotrexate vial 25mg/ml with 27 g 1/2 '' as [facility] does not have, sent to his local pharmacy. If this is also not available/covered will Rx oral methotrexate tablets  If no improvement with resumption/addition  of methotrexate or if unable to continue resume methotrexate b/o fibroscan will add  hydroxychloroquine and sulfasalazine in addition to rituximab next dose due 8/24/23and 9/7/23    Also reconsider Jakinib, preferably upadacitinib if 2nd course of rituximab unhelpful  Cont lefunomide 20mg daily  Cont prednisone 10mg daily given HDA  Cont Bactrim-DS M-W-F  RTC mid August, 2 months with standing labs, QG-TB hepatitis B serologies, immunoglobulins and Rituxan sensitivity.

## 2023-06-16 NOTE — TELEPHONE ENCOUNTER
Hello, this is Nelsy Gardner, clinical pharmacist with [facility] that is part of your care team.  We have begun working on your prescription that your doctor has sent us. Our next steps include:     Working with your insurance company to obtain approval for your medication  Working with you to ensure your medication is affordable     We will be calling you along the way with updates on your medication but if you have any concerns or receive information that you would like to discuss please reach us at [phone].    Patient's wife states they were never able to receive the Rasuvo. Will call [facility].       Welcome call outcome: Patient/caregiver reached

## 2023-06-21 ENCOUNTER — PATIENT MESSAGE (OUTPATIENT)
Dept: RHEUMATOLOGY | Facility: CLINIC | Age: 53
End: 2023-06-21
Payer: MEDICAID

## 2023-06-24 NOTE — TELEPHONE ENCOUNTER
Specialty Pharmacy - Clinical Reassessment    Specialty Medication Orders Linked to Encounter      Flowsheet Row Most Recent Value   Medication #1 methotrexate, PF, (RASUVO, PF,) 25 mg/0.5 mL AtIn (Order#[id number], Rx#[id number])   Medication #2 methotrexate, PF, (RASUVO, PF,) 25 mg/0.5 mL AtIn (Order#[id number], Rx#[id number])          Patient Diagnosis   M06.9 - Rheumatoid disease    Joaquín Rod is a 52 y.o. male, who is followed by the specialty pharmacy service for management and education of his RA.  He has been on therapy with Rasuvo since 3/16/22.  I have reviewed his electronic medical record and current medication list and determined that specialty medication adjustment Is not needed at this time.    Patient has not experienced adverse events.  He is adherent but reported missing 1-2 doses due to Covid/Flu in December.  Adherence has been encouraged with the following mechanism(s): Proactive refill calls.  He is meeting goals of therapy and will continue treatment.        11/28/2022 11/3/2022 10/10/2022 9/29/2022 8/29/2022 7/26/2022 7/11/2022   Follow Up Review   # of missed doses 0 0  0 1       Pt was sick and was told from provider to hold. 2 0   Reason      [place] ill or sick    New Medications? No No  No No Yes       Arava dose increased to 20 mg No   New Conditions? No No  No No No No   New Allergies? No No  No No No No   Med Effective? Good Very good  Good Good Very good Good   Missed activities?   Missed Work       Pt's wife stated pt cannot work due to RA.  He quit his job in Feb       # of Missed Activities   28       Urgent Care? No No  No No No No   Requested Pharmacist? No No  No No No No       Multiple values from one day are sorted in reverse-chronological order         Therapy is appropriate to continue.    Therapy is effective: Yes  On scale of 1 to 10, how does patient rank quality of life? (10 - Best): 5  Recommendations: none at this time.  Review Method: Patient Contact    Tasks  added this encounter   No tasks added.   Tasks due within next 3 months   1/18/2023 - Clinical - Follow Up Assesement (Annual)  1/18/2023 - Refill Call (Auto Added)     Nelsy Dyer, PharmD  [facility]  [address]  [facility] [address]  Phone: [phone]  Fax: [phone]   impaired balance/pain

## 2023-07-06 ENCOUNTER — PATIENT MESSAGE (OUTPATIENT)
Dept: RHEUMATOLOGY | Facility: CLINIC | Age: 53
End: 2023-07-06
Payer: MEDICAID

## 2023-07-06 ENCOUNTER — SPECIALTY PHARMACY (OUTPATIENT)
Dept: PHARMACY | Facility: CLINIC | Age: 53
End: 2023-07-06
Payer: MEDICAID

## 2023-07-06 NOTE — TELEPHONE ENCOUNTER
Routed Rasuvo to [facility] with success. Patient was NOT unable to fill through [facility] due to their computer issue. Patient has already received first dose of Rasuvo through [facility].

## 2023-07-11 ENCOUNTER — PATIENT MESSAGE (OUTPATIENT)
Dept: RHEUMATOLOGY | Facility: CLINIC | Age: 53
End: 2023-07-11
Payer: MEDICAID

## 2023-07-12 ENCOUNTER — PATIENT MESSAGE (OUTPATIENT)
Dept: RHEUMATOLOGY | Facility: CLINIC | Age: 53
End: 2023-07-12
Payer: MEDICAID

## 2023-07-13 ENCOUNTER — TELEPHONE (OUTPATIENT)
Dept: RHEUMATOLOGY | Facility: CLINIC | Age: 53
End: 2023-07-13
Payer: MEDICAID

## 2023-07-13 ENCOUNTER — OFFICE VISIT (OUTPATIENT)
Dept: RHEUMATOLOGY | Facility: CLINIC | Age: 53
End: 2023-07-13
Payer: MEDICAID

## 2023-07-13 ENCOUNTER — PATIENT MESSAGE (OUTPATIENT)
Dept: PHARMACY | Facility: CLINIC | Age: 53
End: 2023-07-13
Payer: MEDICAID

## 2023-07-13 ENCOUNTER — HOSPITAL ENCOUNTER (OUTPATIENT)
Dept: RADIOLOGY | Facility: HOSPITAL | Age: 53
Discharge: HOME | End: 2023-07-13
Attending: INTERNAL MEDICINE
Payer: MEDICAID

## 2023-07-13 ENCOUNTER — PATIENT MESSAGE (OUTPATIENT)
Dept: CARDIOLOGY | Facility: CLINIC | Age: 53
End: 2023-07-13
Payer: MEDICAID

## 2023-07-13 VITALS
WEIGHT: 235.88 LBS | DIASTOLIC BLOOD PRESSURE: 81 MMHG | HEIGHT: 66 IN | SYSTOLIC BLOOD PRESSURE: 130 MMHG | BODY MASS INDEX: 37.91 KG/M2 | HEART RATE: 92 BPM

## 2023-07-13 DIAGNOSIS — M05.712 RHEUMATOID ARTHRITIS INVOLVING BOTH SHOULDERS WITH POSITIVE RHEUMATOID FACTOR: Primary | ICD-10-CM

## 2023-07-13 DIAGNOSIS — T14.8XXA AVULSION FRACTURE: ICD-10-CM

## 2023-07-13 DIAGNOSIS — M05.711 RHEUMATOID ARTHRITIS INVOLVING BOTH SHOULDERS WITH POSITIVE RHEUMATOID FACTOR: ICD-10-CM

## 2023-07-13 DIAGNOSIS — M05.712 RHEUMATOID ARTHRITIS INVOLVING BOTH SHOULDERS WITH POSITIVE RHEUMATOID FACTOR: ICD-10-CM

## 2023-07-13 DIAGNOSIS — Z79.899 LONG-TERM USE OF PLAQUENIL: ICD-10-CM

## 2023-07-13 DIAGNOSIS — R42 LIGHTHEADED: ICD-10-CM

## 2023-07-13 DIAGNOSIS — I10 ESSENTIAL HYPERTENSION: Primary | ICD-10-CM

## 2023-07-13 DIAGNOSIS — R06.02 SHORTNESS OF BREATH: ICD-10-CM

## 2023-07-13 DIAGNOSIS — M05.711 RHEUMATOID ARTHRITIS INVOLVING BOTH SHOULDERS WITH POSITIVE RHEUMATOID FACTOR: Primary | ICD-10-CM

## 2023-07-13 PROBLEM — Z79.631 METHOTREXATE, LONG TERM, CURRENT USE: Status: RESOLVED | Noted: 2023-02-15 | Resolved: 2023-07-13

## 2023-07-13 PROCEDURE — 3008F PR BODY MASS INDEX (BMI) DOCUMENTED: ICD-10-PCS | Mod: CPTII,NTX,, | Performed by: INTERNAL MEDICINE

## 2023-07-13 PROCEDURE — 73630 XR FOOT COMPLETE 3 VIEW BILATERAL: ICD-10-PCS | Mod: 26,50,TXP, | Performed by: RADIOLOGY

## 2023-07-13 PROCEDURE — 72052 X-RAY EXAM NECK SPINE 6/>VWS: CPT | Mod: TC,TXP

## 2023-07-13 PROCEDURE — 3008F BODY MASS INDEX DOCD: CPT | Mod: CPTII,NTX,, | Performed by: INTERNAL MEDICINE

## 2023-07-13 PROCEDURE — 3079F DIAST BP 80-89 MM HG: CPT | Mod: CPTII,NTX,, | Performed by: INTERNAL MEDICINE

## 2023-07-13 PROCEDURE — 1159F PR MEDICATION LIST DOCUMENTED IN MEDICAL RECORD: ICD-10-PCS | Mod: CPTII,NTX,, | Performed by: INTERNAL MEDICINE

## 2023-07-13 PROCEDURE — 72052 XR CERVICAL SPINE 5 VIEW WITH FLEX AND EXT: ICD-10-PCS | Mod: 26,TXP,, | Performed by: RADIOLOGY

## 2023-07-13 PROCEDURE — 99999 PR PBB SHADOW E&M-EST. PATIENT-LVL V: ICD-10-PCS | Mod: PBBFAC,TXP,, | Performed by: INTERNAL MEDICINE

## 2023-07-13 PROCEDURE — 99215 OFFICE O/P EST HI 40 MIN: CPT | Mod: PBBFAC,NTX | Performed by: INTERNAL MEDICINE

## 2023-07-13 PROCEDURE — 73630 X-RAY EXAM OF FOOT: CPT | Mod: 26,50,TXP, | Performed by: RADIOLOGY

## 2023-07-13 PROCEDURE — 1159F MED LIST DOCD IN RCRD: CPT | Mod: CPTII,NTX,, | Performed by: INTERNAL MEDICINE

## 2023-07-13 PROCEDURE — 99213 OFFICE O/P EST LOW 20 MIN: CPT | Mod: S$PBB,NTX,, | Performed by: INTERNAL MEDICINE

## 2023-07-13 PROCEDURE — 3079F PR MOST RECENT DIASTOLIC BLOOD PRESSURE 80-89 MM HG: ICD-10-PCS | Mod: CPTII,NTX,, | Performed by: INTERNAL MEDICINE

## 2023-07-13 PROCEDURE — 99999 PR PBB SHADOW E&M-EST. PATIENT-LVL V: CPT | Mod: PBBFAC,TXP,, | Performed by: INTERNAL MEDICINE

## 2023-07-13 PROCEDURE — 3075F PR MOST RECENT SYSTOLIC BLOOD PRESS GE 130-139MM HG: ICD-10-PCS | Mod: CPTII,NTX,, | Performed by: INTERNAL MEDICINE

## 2023-07-13 PROCEDURE — 72052 X-RAY EXAM NECK SPINE 6/>VWS: CPT | Mod: 26,TXP,, | Performed by: RADIOLOGY

## 2023-07-13 PROCEDURE — 73630 X-RAY EXAM OF FOOT: CPT | Mod: TC,50,TXP

## 2023-07-13 PROCEDURE — 3075F SYST BP GE 130 - 139MM HG: CPT | Mod: CPTII,NTX,, | Performed by: INTERNAL MEDICINE

## 2023-07-13 PROCEDURE — 99213 PR OFFICE/OUTPT VISIT, EST, LEVL III, 20-29 MIN: ICD-10-PCS | Mod: S$PBB,NTX,, | Performed by: INTERNAL MEDICINE

## 2023-07-13 RX ORDER — HYDROXYCHLOROQUINE SULFATE 200 MG/1
200 TABLET, FILM COATED ORAL 2 TIMES DAILY
Qty: 60 TABLET | Refills: 3 | Status: SHIPPED | OUTPATIENT
Start: 2023-07-13 | End: 2023-07-13 | Stop reason: SDUPTHER

## 2023-07-13 RX ORDER — HYDROXYCHLOROQUINE SULFATE 200 MG/1
200 TABLET, FILM COATED ORAL 2 TIMES DAILY
Qty: 60 TABLET | Refills: 3 | Status: SHIPPED | OUTPATIENT
Start: 2023-07-13 | End: 2023-07-14 | Stop reason: SDUPTHER

## 2023-07-13 RX ORDER — UPADACITINIB 15 MG/1
15 TABLET, EXTENDED RELEASE ORAL DAILY
Qty: 30 TABLET | Refills: 3 | Status: SHIPPED | OUTPATIENT
Start: 2023-07-13 | End: 2023-07-14 | Stop reason: SDUPTHER

## 2023-07-13 NOTE — TELEPHONE ENCOUNTER
Frandy, please call him and let him know it;s important he gets in with Podiatrist for avulsion fracture base of the left 5th metatarsal tomorrow or Monday  Thank you MARKOS. MARKOS

## 2023-07-13 NOTE — TELEPHONE ENCOUNTER
Frandy, suggest chest x-ray and HRCT chest with insp/exp and prone views to assess the apical findings on his cervical x-ray Thank you MARKOS

## 2023-07-14 ENCOUNTER — PATIENT MESSAGE (OUTPATIENT)
Dept: RHEUMATOLOGY | Facility: CLINIC | Age: 53
End: 2023-07-14
Payer: MEDICAID

## 2023-07-14 DIAGNOSIS — M05.712 RHEUMATOID ARTHRITIS INVOLVING BOTH SHOULDERS WITH POSITIVE RHEUMATOID FACTOR: ICD-10-CM

## 2023-07-14 DIAGNOSIS — M05.711 RHEUMATOID ARTHRITIS INVOLVING BOTH SHOULDERS WITH POSITIVE RHEUMATOID FACTOR: ICD-10-CM

## 2023-07-14 RX ORDER — UPADACITINIB 15 MG/1
15 TABLET, EXTENDED RELEASE ORAL DAILY
Qty: 30 TABLET | Refills: 1 | Status: ACTIVE | OUTPATIENT
Start: 2023-07-14 | End: 2023-09-04

## 2023-07-14 RX ORDER — PREDNISONE 5 MG/1
10 TABLET ORAL DAILY
Qty: 180 TABLET | Refills: 1 | Status: SHIPPED | OUTPATIENT
Start: 2023-07-14 | End: 2023-09-06 | Stop reason: SDUPTHER

## 2023-07-14 RX ORDER — ZOSTER VACCINE RECOMBINANT, ADJUVANTED 50 MCG/0.5
0.5 KIT INTRAMUSCULAR ONCE
Qty: 1 EACH | Refills: 0 | Status: SHIPPED | OUTPATIENT
Start: 2023-07-14 | End: 2023-07-14

## 2023-07-14 RX ORDER — HYDROXYCHLOROQUINE SULFATE 200 MG/1
200 TABLET, FILM COATED ORAL 2 TIMES DAILY
Qty: 60 TABLET | Refills: 3 | Status: SHIPPED | OUTPATIENT
Start: 2023-07-14 | End: 2023-11-30 | Stop reason: SDUPTHER

## 2023-07-14 RX ORDER — SULFAMETHOXAZOLE AND TRIMETHOPRIM 400; 80 MG/1; MG/1
1 TABLET ORAL
Qty: 36 TABLET | Refills: 3 | Status: SHIPPED | OUTPATIENT
Start: 2023-07-14 | End: 2023-09-06 | Stop reason: SDUPTHER

## 2023-07-17 NOTE — PROGRESS NOTES
I have personally reviewed the history, confirmed exam findings, and discussed assessment and plan with fellow.        PFTs        FVC     FEV1/FVC     TLC       RV      DLco     6/12/23    90.9       88                 78         50.7     92.5  2/2/23      75.4       85                 71.3      42.2     75.6  8/17/22    83.9       88                 74.9      41.1     89.2  4/14/21    86.5       86                 75         52.1     95         HRCT chest 10/7/22:  Impression:     1.  No CT evidence for interstitial pneumonia.  2. circumscribed hypodense lesion within the liver, stable in consistent with hepatic cysts versus hemangiomas.     Electronically signed by resident: Ingird Hernandez  Date:                                            10/07/2022  Time:                                           13:50        *saw Dr. Lemos today 6/12/23:     Assessment:  1. Rheumatoid arthritis, involving unspecified site, unspecified whether rheumatoid factor present     Plan:      CT chest without evidence of ILD. FVC and DLCO improved from previous. Continue current management for RA with MTX and rituximab.      RTC in 1 year or sooner if needed.         CPX 10/19/22: Severe functional impairment associated with a normal breathing reserve, normal oxygen stauration, poor effort, and a reduced AT. These findings are indicative of functional impairment secondary to poor effort.  The ECG portion of this study is negative for myocardial ischemia.  There was no ST segment deviation noted during stress.  The test was stopped because the patient experienced fatigue.  The patient's exercise capacity was below average.  There were no arrhythmias during stress     Holter 10/19/22 :Sinus rhythm with rare PACs/PVCs     Date/Time: 2/15/2023 2:15 PM:        Findings  Median liver stiffness score:  10.1  CAP Reading: dB/m:  380     IQR/med %:  13  Interpretation  Fibrosis interpretation is based on medial liver stiffness - Kilopascal  (kPa).     Fibrosis Stage:  F3  Steatosis interpretation is based on controlled attenuation parameter - (dB/m).     Steatosis Grade:  S3  Comments/Plan:  F2-F3 fibrosis        csDMARDs:  Methotrexate 25mg sc once a week since onset but held with concern for methotrexate contribution to restrictive lung disease but CT chest negative but never resumed. 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 Lemos in [facility]  Intermittently elevated CK, normal myositis autoantibodies, normal MRI thighs  NAFLD with apparent stage 2-3 fibrosis by fibroscan  followed in [facility]  S/p Strongyloides Rx with ivermectin  Lightheadedness, dizziness  ASCVD 5.1%(low)  Foot pain     X-ray feet, ref to Podiatry   F/u Cardiology(Dr. Canas) for lightheadedness and dizziness concern for arrhythmia may need ILR   Will discontinue methotrexate and leflunomide  given the  liver  fibroscan findings  Add hydroxchloroquine 200mg twice daily  Baseline Ophthalmology exam   Add upadacitinib 15mg daily after Shingrix #1, risks and benefits discussed included boxed warning on all JAKinibs for serious and potentially fatal infections, MACE, malignancy thrombosis and  death. Wishes to proceed after discussion. Rx sent to [facility]  Cont lefunomide 20mg daily  Cont prednisone 10mg daily given  HDA  Cont Bactrim-DS M-W-F  RTC 6 wks with standing labs(4 wks after starting upadacitinib)

## 2023-07-17 NOTE — PROGRESS NOTES
"Subjective:      Patient ID: Joaquín Rod is a 52 y.o. male.    Chief Complaint: Disease Management    52 year old man with PMH Seronegative RA with positive Anti CarP, HTN, HLD, SOB presents for follow up. Patient managed on Rituxan, Arava 20, MTX, Prednisone 10 mg daily. Patient was started on Rituxan and received this medication 2/24 and 3/10/23. He reports significant initial relief, Unfortunately he now continues with joint pain, swelling, and stiffness throughout his body leading to difficulties cleaning, bathing, dressing himself etc. SOB improved at this time and recent PFTs improved as well. He follows with Dr. Oshea. Patient reports his joint pain is roughly the same as when he was seen 1 mo ago.     Patient and his wife express concern regarding lightheadedness. Patient reports episodes of lightheadedness unprovoked by postural changes. Without LOC, palpitations, or ictal episodes. He is not on any new meds. He reports that his VS have been somewhat fluctuating over the last month or so with HR in the 150s at times and SBP ranging from 100s-180s without clear inciting events.   Review of Systems   Constitutional:  Positive for activity change and fatigue. Negative for appetite change, chills and fever.   Respiratory:  Positive for shortness of breath (inproved). Negative for apnea, cough, choking, chest tightness, wheezing and stridor.    Cardiovascular:  Negative for chest pain and palpitations.   Gastrointestinal:  Negative for abdominal distention, abdominal pain, constipation, diarrhea and vomiting.   Musculoskeletal:  Positive for arthralgias, gait problem and joint swelling. Negative for back pain, neck pain and neck stiffness.   Skin:  Negative for color change, pallor, rash and wound.   Neurological:  Positive for light-headedness. Negative for seizures, syncope, facial asymmetry and headaches.      Objective:   /81   Pulse 92   Ht 5' 6" (1.676 m)   Wt 107 kg (235 lb 14.3 oz)   " BMI 38.07 kg/m²   Physical Exam   Constitutional: normal appearance. He appears obese. No distress. He does not appear ill.   HENT:   Head: Normocephalic and atraumatic.   Nose: Nose normal. No rhinorrhea or nasal congestion.   Mouth/Throat: Mucous membranes are moist. No oropharyngeal exudate or posterior oropharyngeal erythema. Oropharynx is clear.   Eyes: Pupils are equal, round, and reactive to light. Conjunctivae are normal. Right eye exhibits no discharge. Left eye exhibits no discharge. No scleral icterus.   Neck: Carotid bruit is not present.   Cardiovascular: Normal rate, regular rhythm, normal heart sounds and normal pulses. Exam reveals no gallop and no friction rub.   No murmur heard.  Pulmonary/Chest: Effort normal and breath sounds normal. No stridor. No respiratory distress. He has no wheezes. He has no rhonchi. He has no rales. He exhibits no tenderness.   Abdominal: Bowel sounds are normal. He exhibits no distension and no mass. There is no abdominal tenderness. There is no rebound and no guarding. No hernia.   Musculoskeletal:         General: Swelling and tenderness present. No deformity or signs of injury. Normal range of motion.      Cervical back: Normal range of motion and neck supple. No rigidity or tenderness.      Right lower leg: No edema.      Left lower leg: No edema.   Lymphadenopathy:     He has no cervical adenopathy.   Neurological: He is alert.   Skin: Skin is warm and dry.   Vitals reviewed.     9/28/2022 1/25/2023 5/3/2023 6/12/2023   Tender (JOSEPH-28) 25 / 28 26 / 28 22 / 28 27 / 28    Swollen (JOSEPH-28) 6 / 28 12 / 28 18 / 28 12 / 28    Provider Global 35 mm 80 mm 70 mm 80 mm   Patient Global 70 mm 70 mm 75 mm 85 mm   ESR 23 mm/hr 23 mm/hr 3 mm/hr 2 mm/hr   CRP 1 mg/L 1 mg/L 0.4 mg/L 0.7 mg/L   JOSEPH-28 (ESR) 6.66 (High disease activity) 7 (High disease activity) 5.63 (High disease activity) 5.55 (High disease activity)   JOSEPH-28 (CRP) 5.68 (High disease activity) 6.01 (High  disease activity) 5.95 (High disease activity) 6.22 (High disease activity)   CDAI Score 41.5  53  54.5  55.5            Assessment:     Seronegative RF: Patient with positive CarP Ab. He has failed TNF, Orencia, and Actemra. On Arava 20, Prednisone 10 daily. S/p Rituxan with initial relief, though not long lasting. MTX held due to initial concerns for lung pathology. This was restarted as no longer concerned for MTX induced lung injury. Concern for worsening of his known chronic liver disease, so will discontinue Arava and MTX at this time and initiate Rinvoq and HCQ. Rinvoq is less than ideal given his CV co morbidities, however we have exhausted most other options. ASCVD 5.1%.    -Discontinue MTX/Arava. Start HCQ and Rinvoq. Thorough discussion of CV risks with Rinvoq was had. Patient provided with handout detailing rinvoq's risks and benefits and CV risks were emphasized. Patient agrees with use of rinvoq. Patient advised to get eyes checked yearly while on HCQ.   -continue bactrim for prophylaxis, will re consider further use of rtx at patients next appt depending on response to rinvoq.   -Asked patient to bring in vaccine records from PCP     SOB: HRCT unremarkable. PFTs now improving. SOB improving  -Continue to follow with advanced lung     Strongyloides Ab positive: Patient and wife state that he did take recently prescribed ivermectin.      Lightheadedness: evaluated by cardiology in the past with holter monitor showing rare PAC/PVCs. Concern given VS reported abnormalities during these events.   -Cardiology referral placed.     Foot pain:   -XR ordered  -Podiatry referral.       Plan:     Problem List Items Addressed This Visit          Pulmonary    Shortness of breath       Immunology/Multi System    Rheumatoid arthritis - Primary    Relevant Orders    Prior authorization Order    X-Ray Cervical Spine 5 View With Flex And Ext (Completed)    Ambulatory referral/consult to Podiatry    X-Ray Foot Complete  Bilateral (Completed)     Other Visit Diagnoses       Lightheaded        Relevant Orders    Ambulatory referral/consult to Cardiology    Long-term use of Plaquenil        Relevant Orders    Ambulatory referral/consult to Ophthalmology          Roberto Carlos Tamez

## 2023-07-18 ENCOUNTER — TELEPHONE (OUTPATIENT)
Dept: PHARMACY | Facility: CLINIC | Age: 53
End: 2023-07-18
Payer: MEDICAID

## 2023-07-18 NOTE — TELEPHONE ENCOUNTER
Hello, this is Roberto Carlos Tang, clinical pharmacist with [facility] that is part of your care team.  We have begun working on your prescription that your doctor has sent us. Our next steps include:     Working with your insurance company to obtain approval for your medication  Working with you to ensure your medication is affordable     We will be calling you along the way with updates on your medication but if you have any concerns or receive information that you would like to discuss please reach us at [phone].    Welcome call outcome: Patient/caregiver reached

## 2023-07-20 ENCOUNTER — TELEPHONE (OUTPATIENT)
Dept: ADMINISTRATIVE | Facility: HOSPITAL | Age: 53
End: 2023-07-20
Payer: MEDICAID

## 2023-07-26 ENCOUNTER — HOSPITAL ENCOUNTER (OUTPATIENT)
Dept: CARDIOLOGY | Facility: CLINIC | Age: 53
Discharge: HOME | End: 2023-07-26
Payer: MEDICAID

## 2023-07-26 ENCOUNTER — OFFICE VISIT (OUTPATIENT)
Dept: CARDIOLOGY | Facility: CLINIC | Age: 53
End: 2023-07-26
Payer: MEDICAID

## 2023-07-26 VITALS
DIASTOLIC BLOOD PRESSURE: 71 MMHG | WEIGHT: 236 LBS | BODY MASS INDEX: 35.77 KG/M2 | SYSTOLIC BLOOD PRESSURE: 115 MMHG | HEIGHT: 68 IN

## 2023-07-26 DIAGNOSIS — R06.02 SHORTNESS OF BREATH: Primary | ICD-10-CM

## 2023-07-26 DIAGNOSIS — M05.711 RHEUMATOID ARTHRITIS INVOLVING BOTH SHOULDERS WITH POSITIVE RHEUMATOID FACTOR: ICD-10-CM

## 2023-07-26 DIAGNOSIS — R42 LIGHTHEADED: ICD-10-CM

## 2023-07-26 DIAGNOSIS — E66.01 MORBID OBESITY: ICD-10-CM

## 2023-07-26 DIAGNOSIS — E78.5 DYSLIPIDEMIA: ICD-10-CM

## 2023-07-26 DIAGNOSIS — M05.712 RHEUMATOID ARTHRITIS INVOLVING BOTH SHOULDERS WITH POSITIVE RHEUMATOID FACTOR: ICD-10-CM

## 2023-07-26 DIAGNOSIS — I10 ESSENTIAL HYPERTENSION: ICD-10-CM

## 2023-07-26 PROCEDURE — 99214 PR OFFICE/OUTPT VISIT, EST, LEVL IV, 30-39 MIN: ICD-10-PCS | Mod: S$PBB,NTX,, | Performed by: INTERNAL MEDICINE

## 2023-07-26 PROCEDURE — 3008F PR BODY MASS INDEX (BMI) DOCUMENTED: ICD-10-PCS | Mod: CPTII,NTX,, | Performed by: STUDENT IN AN ORGANIZED HEALTH CARE EDUCATION/TRAINING PROGRAM

## 2023-07-26 PROCEDURE — 93010 EKG 12-LEAD: ICD-10-PCS | Mod: S$PBB,NTX,, | Performed by: INTERNAL MEDICINE

## 2023-07-26 PROCEDURE — 93005 ELECTROCARDIOGRAM TRACING: CPT | Mod: PBBFAC,NTX | Performed by: INTERNAL MEDICINE

## 2023-07-26 PROCEDURE — 1159F MED LIST DOCD IN RCRD: CPT | Mod: CPTII,NTX,, | Performed by: STUDENT IN AN ORGANIZED HEALTH CARE EDUCATION/TRAINING PROGRAM

## 2023-07-26 PROCEDURE — 1160F PR REVIEW ALL MEDS BY PRESCRIBER/CLIN PHARMACIST DOCUMENTED: ICD-10-PCS | Mod: CPTII,NTX,, | Performed by: STUDENT IN AN ORGANIZED HEALTH CARE EDUCATION/TRAINING PROGRAM

## 2023-07-26 PROCEDURE — 99214 OFFICE O/P EST MOD 30 MIN: CPT | Mod: PBBFAC,TXP | Performed by: STUDENT IN AN ORGANIZED HEALTH CARE EDUCATION/TRAINING PROGRAM

## 2023-07-26 PROCEDURE — 3078F DIAST BP <80 MM HG: CPT | Mod: CPTII,NTX,, | Performed by: STUDENT IN AN ORGANIZED HEALTH CARE EDUCATION/TRAINING PROGRAM

## 2023-07-26 PROCEDURE — 3008F BODY MASS INDEX DOCD: CPT | Mod: CPTII,NTX,, | Performed by: STUDENT IN AN ORGANIZED HEALTH CARE EDUCATION/TRAINING PROGRAM

## 2023-07-26 PROCEDURE — 99999 PR PBB SHADOW E&M-EST. PATIENT-LVL IV: CPT | Mod: PBBFAC,TXP,, | Performed by: STUDENT IN AN ORGANIZED HEALTH CARE EDUCATION/TRAINING PROGRAM

## 2023-07-26 PROCEDURE — 3074F PR MOST RECENT SYSTOLIC BLOOD PRESSURE < 130 MM HG: ICD-10-PCS | Mod: CPTII,NTX,, | Performed by: STUDENT IN AN ORGANIZED HEALTH CARE EDUCATION/TRAINING PROGRAM

## 2023-07-26 PROCEDURE — 99214 OFFICE O/P EST MOD 30 MIN: CPT | Mod: S$PBB,NTX,, | Performed by: INTERNAL MEDICINE

## 2023-07-26 PROCEDURE — 99999 PR PBB SHADOW E&M-EST. PATIENT-LVL IV: ICD-10-PCS | Mod: PBBFAC,TXP,, | Performed by: STUDENT IN AN ORGANIZED HEALTH CARE EDUCATION/TRAINING PROGRAM

## 2023-07-26 PROCEDURE — 1159F PR MEDICATION LIST DOCUMENTED IN MEDICAL RECORD: ICD-10-PCS | Mod: CPTII,NTX,, | Performed by: STUDENT IN AN ORGANIZED HEALTH CARE EDUCATION/TRAINING PROGRAM

## 2023-07-26 PROCEDURE — 3074F SYST BP LT 130 MM HG: CPT | Mod: CPTII,NTX,, | Performed by: STUDENT IN AN ORGANIZED HEALTH CARE EDUCATION/TRAINING PROGRAM

## 2023-07-26 PROCEDURE — 3078F PR MOST RECENT DIASTOLIC BLOOD PRESSURE < 80 MM HG: ICD-10-PCS | Mod: CPTII,NTX,, | Performed by: STUDENT IN AN ORGANIZED HEALTH CARE EDUCATION/TRAINING PROGRAM

## 2023-07-26 PROCEDURE — 93010 ELECTROCARDIOGRAM REPORT: CPT | Mod: S$PBB,NTX,, | Performed by: INTERNAL MEDICINE

## 2023-07-26 PROCEDURE — 1160F RVW MEDS BY RX/DR IN RCRD: CPT | Mod: CPTII,NTX,, | Performed by: STUDENT IN AN ORGANIZED HEALTH CARE EDUCATION/TRAINING PROGRAM

## 2023-07-26 NOTE — PROGRESS NOTES
Cardiology Clinic Note  Reason for Visit: follow up    HPI:   Pt is a 53yo M with pmhx of RA (on rynvoq, HCQ), HTN, HLD [place] [facility] [place]    Today:  CPX showing deconditioning.  Repeat echo from 6/2023 showing preserved EF, no DD.  Holter has been negative for arrhythmias.  He follows with Dr. Oshea who performed a CT scan with no signs of ILD/fibrosis.  Saw his rheumatologist who felt that his SOB and labile BP were due to his DMARDs.  They took him off the leflunamide and MTX and he is now on rynvoq and HCq.  Since the change he has no complaints.  His BP are in the 130s systolic at home.  Walking 2-3 miles daily at a slow pace with a RWW.  Denies any chest pain, syncope, Pichardo, irregular heart beats.    Last clinic visit 10/2022:  At that visit was complaining of SOB.  There was c/f pulmonary fibrosis from RA medications vs possible bouts of AFib vs deconditioning.  CPX    ROS:    Constitution: Negative for fever, chills, weight loss or gain.   HENT: Negative for sore throat, rhinorrhea, or headache.  Eyes: Negative for blurred or double vision.   Cardiovascular: See above  Pulmonary: Negative for SOB   Gastrointestinal: Negative for abdominal pain, nausea, vomiting, or diarrhea.   : Negative for dysuria.   Neurological: Negative for focal weakness or sensory changes.  PMH:     Past Medical History:   Diagnosis Date    HLD (hyperlipidemia)     HTN (hypertension)     MARTI on CPAP     Rheumatoid arthritis, unspecified      No past surgical history on file.  Allergies:   Review of patient's allergies indicates:  No Known Allergies  Medications:     Current Outpatient Medications on File Prior to Visit   Medication Sig Dispense Refill    amLODIPine (NORVASC) 10 MG tablet amlodipine 10 mg tablet   TAKE 1 TABLET BY MOUTH EVERY DAY      atorvastatin (LIPITOR) 20 MG tablet atorvastatin 20 mg tablet   TAKE 1 TABLET BY MOUTH EVERY DAY      budesonide (PULMICORT) 0.5 mg/2 mL nebulizer solution 2 mLs.       budesonide 1 mg/2 mL [facility] budesonide 1 mg/2 mL suspension for nebulization   INHALE 2 ML TWICE A DAY BY NEBULIZATION ROUTE AS NEEDED.      busPIRone (BUSPAR) 10 MG tablet Take 10 mg by mouth 2 (two) times daily.      diazePAM (VALIUM) 5 MG tablet Take 1 tablet (5 mg total) by mouth every 6 (six) hours as needed for Anxiety. 5 tablet 0    fluticasone propionate (FLONASE) 50 mcg/actuation nasal spray fluticasone propionate 50 mcg/actuation nasal spray,suspension   SPRAY 2 SPRAYS INTO EACH NOSTRIL EVERY DAY      hydrOXYchloroQUINE (PLAQUENIL) 200 mg tablet Take 1 tablet (200 mg total) by mouth 2 (two) times daily. 60 tablet 3    predniSONE (DELTASONE) 5 MG tablet Take 2 tablets (10 mg total) by mouth once daily. 180 tablet 1    sulfamethoxazole-trimethoprim 400-80mg (BACTRIM,SEPTRA) 400-80 mg per tablet Take 1 tablet by mouth every Mon, Wed, Fri. 36 tablet 3    telmisartan-hydrochlorothiazide (MICARDIS HCT) 80-25 mg per tablet telmisartan 80 mg-hydrochlorothiazide 25 mg tablet   TAKE 1 TABLET BY MOUTH EVERY DAY      upadacitinib (RINVOQ) 15 mg 24 hr tablet Take 1 tablet (15 mg total) by mouth once daily. 30 tablet 1    venlafaxine (EFFEXOR) 37.5 MG Tab Take 37.5 mg by mouth.       No current facility-administered medications on file prior to visit.     Social History:     Social History     Tobacco Use    Smoking status: Never     Passive exposure: Never    Smokeless tobacco: Current     Types: Chew   Substance Use Topics    Alcohol use: Never     Family History:   No family history on file.  Physical Exam:   There were no vitals taken for this visit.   Wt Readings from Last 4 Encounters:   07/13/23 107 kg (235 lb 14.3 oz)   06/12/23 106 kg (233 lb 11 oz)   06/12/23 110 kg (242 lb 8.1 oz)   06/12/23 110.2 kg (243 lb)         Constitutional: No distress, obese, conversant  HEENT: Sclera anicteric, PERRLA, EOMI  Neck: No JVD, no masses, good movement  CV: RRR, S1 and S2 normal, no additional heart sounds or murmurs.  Pulses 2+ and equal bilaterally in radial arteries, Ata's normal on right. Distal pulses are 2+ and equal in the femoral, DP and PT areas bilaterally  Pulm: Clear to auscultation bilaterally with symmetrical expansion. Chest wall palpated for reproduction of pain symptoms, and no pain was able to be produced on palpation or resistance exercises  GI: Abdomen soft, non-tender, good bowel sounds  Extremities: Both extremities intact and grossly normal, skin is warm, no edema noted  Skin: No ecchymosis, erythema, or ulcers  Psych: AOx3, appropriate affect  Neuro: CNII-XII intact, no focal deficits      Labs:     Lab Results   Component Value Date     06/12/2023    K 4.1 06/12/2023     06/12/2023    CO2 28 06/12/2023    BUN 11 06/12/2023    CREATININE 0.9 06/12/2023    ANIONGAP 9 06/12/2023     No results found for: HGBA1C  No results found for: BNP, BNPTRIAGEBLO Lab Results   Component Value Date    WBC 7.20 06/12/2023    HGB 16.2 06/12/2023    HCT 49.0 06/12/2023     06/12/2023    GRAN 4.9 06/12/2023    GRAN 68.0 06/12/2023     Lab Results   Component Value Date    CHOL 198 02/02/2023    HDL 52 02/02/2023    LDLCALC 99.2 02/02/2023    TRIG 234 (H) 02/02/2023          Imaging:       EF   Date Value Ref Range Status   06/12/2023 60 % Final   09/28/2022 65 % Final     CPX: 10/2022:  Severe functional impairment associated with a normal breathing reserve, normal oxygen stauration, poor effort, and a reduced AT. These findings are indicative of functional impairment secondary to poor effort.  The ECG portion of this study is negative for myocardial ischemia.  There was no ST segment deviation noted during stress.  The test was stopped because the patient experienced fatigue.  The patient's exercise capacity was below average.  There were no arrhythmias during stress.    Holter 10/2022:  Predominant Rhythm  Sinus arrhythmia with heart rates varying between 53 and 146 BPM with an average of 91BPM.      Maximum heart rate recorded at: 12:29 CDT on day 1.     Minimum heart rate recorded at 05:44 CDT on day 1.    TTE:  6/2023  The left ventricle is normal in size with normal systolic function. The estimated ejection fraction is 60%.  Normal right ventricular size with normal right ventricular systolic function.  Normal left ventricular diastolic function.  The estimated PA systolic pressure is 22 mmHg.  Normal central venous pressure (3 mmHg).  Assessment:   Pt is a 51yo M with pmhx of RA (on rynvoq, HCQ), HTN, HLD who presents to [facility] [place]/u.    Plan:     #Dyspnea on exertion  RESOLVED  - due to his RA medications  - symptoms have resolved since d/c of MTX and leflunomide     #HTN:  BP in clinic today at goal  - continue home amlodipine 5mg qD and telmisartan-HCTZ 80-25mg qD     #HLD:  Last LDL 90  - continue home atorvastatin 20mg qD    #RA:  - follows with rheumatology, on rynvoq and hcq    Signed:  Tyler Duffy MD  Cardiology Fellow  Pager - [phone]  [facility]  7/26/2023 10:23 AM

## 2023-09-01 DIAGNOSIS — M05.712 RHEUMATOID ARTHRITIS INVOLVING BOTH SHOULDERS WITH POSITIVE RHEUMATOID FACTOR: ICD-10-CM

## 2023-09-01 DIAGNOSIS — M05.711 RHEUMATOID ARTHRITIS INVOLVING BOTH SHOULDERS WITH POSITIVE RHEUMATOID FACTOR: ICD-10-CM

## 2023-09-04 RX ORDER — UPADACITINIB 15 MG/1
15 TABLET, EXTENDED RELEASE ORAL
Qty: 30 TABLET | Refills: 1 | Status: SHIPPED | OUTPATIENT
Start: 2023-09-04 | End: 2023-09-06 | Stop reason: SDUPTHER

## 2023-09-06 ENCOUNTER — OFFICE VISIT (OUTPATIENT)
Dept: RHEUMATOLOGY | Facility: CLINIC | Age: 53
End: 2023-09-06
Payer: MEDICAID

## 2023-09-06 ENCOUNTER — HOSPITAL ENCOUNTER (OUTPATIENT)
Dept: RADIOLOGY | Facility: HOSPITAL | Age: 53
Discharge: HOME | End: 2023-09-06
Attending: INTERNAL MEDICINE
Payer: MEDICAID

## 2023-09-06 VITALS
WEIGHT: 234 LBS | SYSTOLIC BLOOD PRESSURE: 114 MMHG | DIASTOLIC BLOOD PRESSURE: 79 MMHG | BODY MASS INDEX: 35.46 KG/M2 | HEIGHT: 68 IN | HEART RATE: 93 BPM

## 2023-09-06 DIAGNOSIS — M05.712 RHEUMATOID ARTHRITIS INVOLVING BOTH SHOULDERS WITH POSITIVE RHEUMATOID FACTOR: Primary | ICD-10-CM

## 2023-09-06 DIAGNOSIS — R06.02 SHORTNESS OF BREATH: ICD-10-CM

## 2023-09-06 DIAGNOSIS — M05.711 RHEUMATOID ARTHRITIS INVOLVING BOTH SHOULDERS WITH POSITIVE RHEUMATOID FACTOR: Primary | ICD-10-CM

## 2023-09-06 PROCEDURE — 99999 PR PBB SHADOW E&M-EST. PATIENT-LVL III: ICD-10-PCS | Mod: PBBFAC,TXP,, | Performed by: INTERNAL MEDICINE

## 2023-09-06 PROCEDURE — 3008F PR BODY MASS INDEX (BMI) DOCUMENTED: ICD-10-PCS | Mod: CPTII,NTX,, | Performed by: INTERNAL MEDICINE

## 2023-09-06 PROCEDURE — 3078F DIAST BP <80 MM HG: CPT | Mod: CPTII,NTX,, | Performed by: INTERNAL MEDICINE

## 2023-09-06 PROCEDURE — 3078F PR MOST RECENT DIASTOLIC BLOOD PRESSURE < 80 MM HG: ICD-10-PCS | Mod: CPTII,NTX,, | Performed by: INTERNAL MEDICINE

## 2023-09-06 PROCEDURE — 99213 PR OFFICE/OUTPT VISIT, EST, LEVL III, 20-29 MIN: ICD-10-PCS | Mod: S$PBB,NTX,, | Performed by: INTERNAL MEDICINE

## 2023-09-06 PROCEDURE — 3074F SYST BP LT 130 MM HG: CPT | Mod: CPTII,NTX,, | Performed by: INTERNAL MEDICINE

## 2023-09-06 PROCEDURE — 99213 OFFICE O/P EST LOW 20 MIN: CPT | Mod: S$PBB,NTX,, | Performed by: INTERNAL MEDICINE

## 2023-09-06 PROCEDURE — 71046 XR CHEST PA AND LATERAL: ICD-10-PCS | Mod: 26,NTX,, | Performed by: RADIOLOGY

## 2023-09-06 PROCEDURE — 99213 OFFICE O/P EST LOW 20 MIN: CPT | Mod: PBBFAC,25,TXP | Performed by: INTERNAL MEDICINE

## 2023-09-06 PROCEDURE — 3074F PR MOST RECENT SYSTOLIC BLOOD PRESSURE < 130 MM HG: ICD-10-PCS | Mod: CPTII,NTX,, | Performed by: INTERNAL MEDICINE

## 2023-09-06 PROCEDURE — 71046 X-RAY EXAM CHEST 2 VIEWS: CPT | Mod: TC,TXP

## 2023-09-06 PROCEDURE — 99999 PR PBB SHADOW E&M-EST. PATIENT-LVL III: CPT | Mod: PBBFAC,TXP,, | Performed by: INTERNAL MEDICINE

## 2023-09-06 PROCEDURE — 71046 X-RAY EXAM CHEST 2 VIEWS: CPT | Mod: 26,NTX,, | Performed by: RADIOLOGY

## 2023-09-06 PROCEDURE — 3008F BODY MASS INDEX DOCD: CPT | Mod: CPTII,NTX,, | Performed by: INTERNAL MEDICINE

## 2023-09-06 RX ORDER — SULFAMETHOXAZOLE AND TRIMETHOPRIM 400; 80 MG/1; MG/1
1 TABLET ORAL
Qty: 36 TABLET | Refills: 3 | Status: SHIPPED | OUTPATIENT
Start: 2023-09-06 | End: 2024-02-14 | Stop reason: SDUPTHER

## 2023-09-06 RX ORDER — UPADACITINIB 15 MG/1
15 TABLET, EXTENDED RELEASE ORAL DAILY
Qty: 30 TABLET | Refills: 1 | Status: ACTIVE | OUTPATIENT
Start: 2023-09-06 | End: 2023-10-03 | Stop reason: SDUPTHER

## 2023-09-06 RX ORDER — PREDNISONE 5 MG/1
10 TABLET ORAL DAILY
Qty: 180 TABLET | Refills: 1 | Status: SHIPPED | OUTPATIENT
Start: 2023-09-06 | End: 2023-11-30 | Stop reason: SDUPTHER

## 2023-09-06 RX ORDER — SULFASALAZINE 500 MG/1
1000 TABLET, DELAYED RELEASE ORAL 2 TIMES DAILY
Qty: 120 TABLET | Refills: 1 | Status: SHIPPED | OUTPATIENT
Start: 2023-09-13 | End: 2023-11-29 | Stop reason: SDUPTHER

## 2023-09-06 RX ORDER — SULFASALAZINE 500 MG/1
1000 TABLET, DELAYED RELEASE ORAL 2 TIMES DAILY
Qty: 180 TABLET | Refills: 1 | Status: SHIPPED | OUTPATIENT
Start: 2023-09-13 | End: 2023-09-06

## 2023-09-06 RX ORDER — SULFASALAZINE 500 MG/1
500 TABLET ORAL 2 TIMES DAILY
Qty: 14 TABLET | Refills: 0 | Status: SHIPPED | OUTPATIENT
Start: 2023-09-06 | End: 2023-09-13

## 2023-09-07 NOTE — PROGRESS NOTES
"Subjective:      Patient ID: Joaquín Rod is a 52 y.o. male.    Chief Complaint: Disease Management    52 year old man with PMH Seronegative RA with positive Anti CarP, HTN, HLD, SOB presents for follow up. Patient managed on Rituxan (last in march of 23'), rinvoq, plaquenil, Prednisone 10 mg daily. Patient was started on Rituxan and received this medication 2/24 and 3/10/23. He reported significant initial relief, Unfortunately he continued with joint pain, swelling, and stiffness throughout his body leading to difficulties cleaning, bathing, dressing himself etc at last appointment. Rinvoq was started at that time, patient reports an ~ 60% improvement in symptoms since starting rinvoq. He reports less SOB and weakness as well.   Review of Systems   Constitutional:  Negative for activity change, appetite change, chills, fatigue and fever.   Respiratory:  Positive for shortness of breath (inproved). Negative for apnea, cough, choking, chest tightness, wheezing and stridor.    Cardiovascular:  Negative for chest pain and palpitations.   Gastrointestinal:  Negative for abdominal distention, abdominal pain, constipation, diarrhea and vomiting.   Musculoskeletal:  Positive for arthralgias, gait problem and joint swelling. Negative for back pain, neck pain and neck stiffness.   Skin:  Negative for color change, pallor, rash and wound.   Neurological:  Negative for seizures, syncope, facial asymmetry, light-headedness and headaches.      Objective:   /79   Pulse 93   Ht 5' 8" (1.727 m)   Wt 106.1 kg (234 lb)   BMI 35.58 kg/m²   Physical Exam   Constitutional: normal appearance. He appears obese. No distress. He does not appear ill.   HENT:   Head: Normocephalic and atraumatic.   Nose: Nose normal. No rhinorrhea or nasal congestion.   Mouth/Throat: Mucous membranes are moist. No oropharyngeal exudate or posterior oropharyngeal erythema. Oropharynx is clear.   Eyes: Pupils are equal, round, and reactive to " light. Conjunctivae are normal. Right eye exhibits no discharge. Left eye exhibits no discharge. No scleral icterus.   Neck: Carotid bruit is not present.   Cardiovascular: Normal rate, regular rhythm, normal heart sounds and normal pulses. Exam reveals no gallop and no friction rub.   No murmur heard.  Pulmonary/Chest: Effort normal and breath sounds normal. No stridor. No respiratory distress. He has no wheezes. He has no rhonchi. He has no rales. He exhibits no tenderness.   Abdominal: Bowel sounds are normal. He exhibits no distension and no mass. There is no abdominal tenderness. There is no rebound and no guarding. No hernia.   Musculoskeletal:         General: Swelling and tenderness present. No deformity or signs of injury. Normal range of motion.      Cervical back: Normal range of motion and neck supple. No rigidity or tenderness.      Right lower leg: No edema.      Left lower leg: No edema.   Lymphadenopathy:     He has no cervical adenopathy.   Neurological: He is alert.   Skin: Skin is warm and dry.   Vitals reviewed.     5/3/2023 6/12/2023 7/13/2023 9/6/2023   Tender (JOSEPH-28) 22 / 28 27 / 28 24 / 28 23 / 28    Swollen (JOSEPH-28) 18 / 28  12 / 28  16 / 28  10 / 28    Provider Global 70 mm 80 mm -- 40 mm   Patient Global 75 mm 85 mm -- 55 mm   ESR 3 mm/hr 2 mm/hr -- --   CRP 0.4 mg/L 0.7 mg/L -- --   JOSEPH-28 (ESR) 5.63 (High disease activity) 5.55 (High disease activity) -- --   JOSEPH-28 (CRP) 5.95 (High disease activity) 6.22 (High disease activity) -- --   CDAI Score 54.5  55.5  -- 42.5         Assessment:     Seronegative RF: Patient with positive CarP Ab. He has failed TNF, Orencia, rituxan, and Actemra. MTX/Arava held secondary to liver concerns. On Rinvoq, HCQ, prednisone 10 mg and improving at this time.   -Continue HCQ and Rinvoq at this time, patient with ~ 60% improvement on this medication. Patient advised to get eyes checked yearly while on HCQ.   -Do not plan further use of rituximab at this  time.   -Will initiate Sulfasalazine 500 mg bid x 1 wk then 1000 bid thereafter. Check labs in 1 mo.   -Check DXA given significant steroid use.   -Refer to PT/OT given difficulties walking.      SOB: HRCT unremarkable. PFTs now improving. SOB improving  -Continue to follow with [facility]    3 mo follow up      Plan:     Problem List Items Addressed This Visit          Immunology/Multi System    Rheumatoid arthritis - Primary    Relevant Medications    predniSONE (DELTASONE) 5 MG tablet    sulfamethoxazole-trimethoprim 400-80mg (BACTRIM,SEPTRA) 400-80 mg per tablet    upadacitinib (RINVOQ) 15 mg 24 hr tablet    Other Relevant Orders    Ambulatory referral/consult to Physical/Occupational Therapy    CBC W/ AUTO DIFFERENTIAL    COMPREHENSIVE METABOLIC PANEL    DXA Bone Density Axial Skeleton 1 or more sites     Roberto Carlos Tamez

## 2023-10-02 ENCOUNTER — PATIENT MESSAGE (OUTPATIENT)
Dept: RHEUMATOLOGY | Facility: CLINIC | Age: 53
End: 2023-10-02
Payer: MEDICAID

## 2023-10-03 ENCOUNTER — TELEPHONE (OUTPATIENT)
Dept: RHEUMATOLOGY | Facility: CLINIC | Age: 53
End: 2023-10-03
Payer: MEDICAID

## 2023-10-03 DIAGNOSIS — M05.711 RHEUMATOID ARTHRITIS INVOLVING BOTH SHOULDERS WITH POSITIVE RHEUMATOID FACTOR: ICD-10-CM

## 2023-10-03 DIAGNOSIS — M05.712 RHEUMATOID ARTHRITIS INVOLVING BOTH SHOULDERS WITH POSITIVE RHEUMATOID FACTOR: ICD-10-CM

## 2023-10-03 RX ORDER — UPADACITINIB 15 MG/1
15 TABLET, EXTENDED RELEASE ORAL DAILY
Qty: 30 TABLET | Refills: 1 | Status: ACTIVE | OUTPATIENT
Start: 2023-10-03 | End: 2023-11-17

## 2023-10-04 NOTE — TELEPHONE ENCOUNTER
Please schedule overdue standing labs this week. Pt needs appt in early November with Dr. Tamez and myself. Please schedule.

## 2023-10-05 ENCOUNTER — HOSPITAL ENCOUNTER (OUTPATIENT)
Dept: RADIOLOGY | Facility: HOSPITAL | Age: 53
Discharge: HOME | End: 2023-10-05
Attending: INTERNAL MEDICINE
Payer: MEDICAID

## 2023-10-05 ENCOUNTER — PATIENT MESSAGE (OUTPATIENT)
Dept: HEPATOLOGY | Facility: CLINIC | Age: 53
End: 2023-10-05
Payer: MEDICAID

## 2023-10-05 ENCOUNTER — HOSPITAL ENCOUNTER (OUTPATIENT)
Dept: RADIOLOGY | Facility: CLINIC | Age: 53
Discharge: HOME | End: 2023-10-05
Attending: INTERNAL MEDICINE
Payer: MEDICAID

## 2023-10-05 DIAGNOSIS — M05.711 RHEUMATOID ARTHRITIS INVOLVING BOTH SHOULDERS WITH POSITIVE RHEUMATOID FACTOR: ICD-10-CM

## 2023-10-05 DIAGNOSIS — M05.712 RHEUMATOID ARTHRITIS INVOLVING BOTH SHOULDERS WITH POSITIVE RHEUMATOID FACTOR: ICD-10-CM

## 2023-10-05 DIAGNOSIS — R06.02 SHORTNESS OF BREATH: ICD-10-CM

## 2023-10-05 PROCEDURE — 71250 CT THORAX DX C-: CPT | Mod: 26,NTX,, | Performed by: RADIOLOGY

## 2023-10-05 PROCEDURE — 77080 DXA BONE DENSITY AXIAL: CPT | Mod: TC,NTX

## 2023-10-05 PROCEDURE — 71250 CT CHEST WITHOUT CONTRAST: ICD-10-PCS | Mod: 26,NTX,, | Performed by: RADIOLOGY

## 2023-10-05 PROCEDURE — 71250 CT THORAX DX C-: CPT | Mod: TC,TXP

## 2023-10-05 PROCEDURE — 77080 DXA BONE DENSITY AXIAL: CPT | Mod: 26,NTX,S$GLB, | Performed by: INTERNAL MEDICINE

## 2023-10-05 PROCEDURE — 77080 DXA BONE DENSITY AXIAL SKELETON 1 OR MORE SITES: ICD-10-PCS | Mod: 26,NTX,S$GLB, | Performed by: INTERNAL MEDICINE

## 2023-10-09 ENCOUNTER — TELEPHONE (OUTPATIENT)
Dept: RHEUMATOLOGY | Facility: CLINIC | Age: 53
End: 2023-10-09
Payer: MEDICAID

## 2023-10-10 NOTE — PROGRESS NOTES
The CT chest shows the ground glass densities noted incidentally on you neck x-ray in July. Will ask Dr. Lemos and Jessi Dias in [facility] to review and decide on next steps

## 2023-10-10 NOTE — TELEPHONE ENCOUNTER
Kobe, would you mind reviewing this patient's HRCT chest from today which reveals new GGO RUL? This was noted incidentally on neck x-ray in July so we repeated his HRCT which confirms the finding. Would like your opinion about next steps. Thanks so much MARKOS

## 2023-10-11 DIAGNOSIS — J84.9 INTERSTITIAL PULMONARY DISEASE, UNSPECIFIED: Primary | ICD-10-CM

## 2023-11-15 DIAGNOSIS — M05.711 RHEUMATOID ARTHRITIS INVOLVING BOTH SHOULDERS WITH POSITIVE RHEUMATOID FACTOR: ICD-10-CM

## 2023-11-15 DIAGNOSIS — M05.712 RHEUMATOID ARTHRITIS INVOLVING BOTH SHOULDERS WITH POSITIVE RHEUMATOID FACTOR: ICD-10-CM

## 2023-11-17 RX ORDER — UPADACITINIB 15 MG/1
15 TABLET, EXTENDED RELEASE ORAL
Qty: 30 TABLET | Refills: 3 | Status: SHIPPED | OUTPATIENT
Start: 2023-11-17 | End: 2023-11-30 | Stop reason: SDUPTHER

## 2023-11-29 ENCOUNTER — HOSPITAL ENCOUNTER (OUTPATIENT)
Dept: PULMONOLOGY | Facility: CLINIC | Age: 53
Discharge: HOME | End: 2023-11-29
Attending: PHYSICIAN ASSISTANT
Payer: MEDICAID

## 2023-11-29 ENCOUNTER — OFFICE VISIT (OUTPATIENT)
Dept: TRANSPLANT | Facility: CLINIC | Age: 53
End: 2023-11-29
Attending: PHYSICIAN ASSISTANT
Payer: MEDICAID

## 2023-11-29 ENCOUNTER — HOSPITAL ENCOUNTER (OUTPATIENT)
Dept: RADIOLOGY | Facility: HOSPITAL | Age: 53
Discharge: HOME | End: 2023-11-29
Attending: PHYSICIAN ASSISTANT
Payer: MEDICAID

## 2023-11-29 ENCOUNTER — OFFICE VISIT (OUTPATIENT)
Dept: RHEUMATOLOGY | Facility: CLINIC | Age: 53
End: 2023-11-29
Payer: MEDICAID

## 2023-11-29 VITALS
HEIGHT: 66 IN | BODY MASS INDEX: 37.98 KG/M2 | HEART RATE: 89 BPM | TEMPERATURE: 99 F | OXYGEN SATURATION: 95 % | DIASTOLIC BLOOD PRESSURE: 73 MMHG | WEIGHT: 236.31 LBS | SYSTOLIC BLOOD PRESSURE: 130 MMHG

## 2023-11-29 VITALS
DIASTOLIC BLOOD PRESSURE: 91 MMHG | WEIGHT: 236 LBS | HEIGHT: 68 IN | SYSTOLIC BLOOD PRESSURE: 142 MMHG | BODY MASS INDEX: 35.77 KG/M2 | HEART RATE: 105 BPM

## 2023-11-29 DIAGNOSIS — J84.9 INTERSTITIAL PULMONARY DISEASE, UNSPECIFIED: ICD-10-CM

## 2023-11-29 DIAGNOSIS — E78.5 HYPERLIPIDEMIA, UNSPECIFIED HYPERLIPIDEMIA TYPE: ICD-10-CM

## 2023-11-29 DIAGNOSIS — M06.9 RHEUMATOID ARTHRITIS, INVOLVING UNSPECIFIED SITE, UNSPECIFIED WHETHER RHEUMATOID FACTOR PRESENT: Primary | ICD-10-CM

## 2023-11-29 DIAGNOSIS — M05.712 RHEUMATOID ARTHRITIS INVOLVING BOTH SHOULDERS WITH POSITIVE RHEUMATOID FACTOR: ICD-10-CM

## 2023-11-29 DIAGNOSIS — M05.711 RHEUMATOID ARTHRITIS INVOLVING BOTH SHOULDERS WITH POSITIVE RHEUMATOID FACTOR: ICD-10-CM

## 2023-11-29 DIAGNOSIS — R29.898 WEAKNESS OF LOWER EXTREMITY, UNSPECIFIED LATERALITY: ICD-10-CM

## 2023-11-29 PROCEDURE — 99213 PR OFFICE/OUTPT VISIT, EST, LEVL III, 20-29 MIN: ICD-10-PCS | Mod: S$PBB,NTX,, | Performed by: INTERNAL MEDICINE

## 2023-11-29 PROCEDURE — 99214 OFFICE O/P EST MOD 30 MIN: CPT | Mod: PBBFAC,25,27,NTX | Performed by: INTERNAL MEDICINE

## 2023-11-29 PROCEDURE — 94010 BREATHING CAPACITY TEST: CPT | Mod: 26,S$PBB,NTX, | Performed by: INTERNAL MEDICINE

## 2023-11-29 PROCEDURE — 94727 GAS DIL/WSHOT DETER LNG VOL: CPT | Mod: PBBFAC,NTX | Performed by: INTERNAL MEDICINE

## 2023-11-29 PROCEDURE — 71250 CT THORAX DX C-: CPT | Mod: 26,NTX,, | Performed by: RADIOLOGY

## 2023-11-29 PROCEDURE — 3008F BODY MASS INDEX DOCD: CPT | Mod: CPTII,NTX,, | Performed by: PHYSICIAN ASSISTANT

## 2023-11-29 PROCEDURE — 99999 PR PBB SHADOW E&M-EST. PATIENT-LVL IV: CPT | Mod: PBBFAC,TXP,, | Performed by: PHYSICIAN ASSISTANT

## 2023-11-29 PROCEDURE — 99214 PR OFFICE/OUTPT VISIT, EST, LEVL IV, 30-39 MIN: ICD-10-PCS | Mod: 25,S$PBB,NTX, | Performed by: PHYSICIAN ASSISTANT

## 2023-11-29 PROCEDURE — 94729 PR C02/MEMBANE DIFFUSE CAPACITY: ICD-10-PCS | Mod: 26,S$PBB,NTX, | Performed by: INTERNAL MEDICINE

## 2023-11-29 PROCEDURE — 94010 BREATHING CAPACITY TEST: ICD-10-PCS | Mod: 26,S$PBB,NTX, | Performed by: INTERNAL MEDICINE

## 2023-11-29 PROCEDURE — 94010 BREATHING CAPACITY TEST: CPT | Mod: PBBFAC,NTX | Performed by: INTERNAL MEDICINE

## 2023-11-29 PROCEDURE — 3080F PR MOST RECENT DIASTOLIC BLOOD PRESSURE >= 90 MM HG: ICD-10-PCS | Mod: CPTII,NTX,, | Performed by: INTERNAL MEDICINE

## 2023-11-29 PROCEDURE — 3075F SYST BP GE 130 - 139MM HG: CPT | Mod: CPTII,NTX,, | Performed by: PHYSICIAN ASSISTANT

## 2023-11-29 PROCEDURE — 94727 GAS DIL/WSHOT DETER LNG VOL: CPT | Mod: 26,S$PBB,NTX, | Performed by: INTERNAL MEDICINE

## 2023-11-29 PROCEDURE — 99999 PR PBB SHADOW E&M-EST. PATIENT-LVL IV: ICD-10-PCS | Mod: PBBFAC,TXP,, | Performed by: INTERNAL MEDICINE

## 2023-11-29 PROCEDURE — 99999 PR PBB SHADOW E&M-EST. PATIENT-LVL IV: ICD-10-PCS | Mod: PBBFAC,TXP,, | Performed by: PHYSICIAN ASSISTANT

## 2023-11-29 PROCEDURE — 94729 DIFFUSING CAPACITY: CPT | Mod: 26,S$PBB,NTX, | Performed by: INTERNAL MEDICINE

## 2023-11-29 PROCEDURE — 1159F PR MEDICATION LIST DOCUMENTED IN MEDICAL RECORD: ICD-10-PCS | Mod: CPTII,NTX,, | Performed by: PHYSICIAN ASSISTANT

## 2023-11-29 PROCEDURE — 3078F PR MOST RECENT DIASTOLIC BLOOD PRESSURE < 80 MM HG: ICD-10-PCS | Mod: CPTII,NTX,, | Performed by: PHYSICIAN ASSISTANT

## 2023-11-29 PROCEDURE — 99214 OFFICE O/P EST MOD 30 MIN: CPT | Mod: 25,S$PBB,NTX, | Performed by: PHYSICIAN ASSISTANT

## 2023-11-29 PROCEDURE — 99999 PR PBB SHADOW E&M-EST. PATIENT-LVL IV: CPT | Mod: PBBFAC,TXP,, | Performed by: INTERNAL MEDICINE

## 2023-11-29 PROCEDURE — 1160F PR REVIEW ALL MEDS BY PRESCRIBER/CLIN PHARMACIST DOCUMENTED: ICD-10-PCS | Mod: CPTII,NTX,, | Performed by: PHYSICIAN ASSISTANT

## 2023-11-29 PROCEDURE — 99214 OFFICE O/P EST MOD 30 MIN: CPT | Mod: PBBFAC,25,TXP | Performed by: PHYSICIAN ASSISTANT

## 2023-11-29 PROCEDURE — 94729 DIFFUSING CAPACITY: CPT | Mod: PBBFAC,NTX | Performed by: INTERNAL MEDICINE

## 2023-11-29 PROCEDURE — 3080F DIAST BP >= 90 MM HG: CPT | Mod: CPTII,NTX,, | Performed by: INTERNAL MEDICINE

## 2023-11-29 PROCEDURE — 1159F MED LIST DOCD IN RCRD: CPT | Mod: CPTII,NTX,, | Performed by: PHYSICIAN ASSISTANT

## 2023-11-29 PROCEDURE — 94727 PR PULM FUNCTION TEST BY GAS: ICD-10-PCS | Mod: 26,S$PBB,NTX, | Performed by: INTERNAL MEDICINE

## 2023-11-29 PROCEDURE — 3008F BODY MASS INDEX DOCD: CPT | Mod: CPTII,NTX,, | Performed by: INTERNAL MEDICINE

## 2023-11-29 PROCEDURE — 3078F DIAST BP <80 MM HG: CPT | Mod: CPTII,NTX,, | Performed by: PHYSICIAN ASSISTANT

## 2023-11-29 PROCEDURE — 3077F PR MOST RECENT SYSTOLIC BLOOD PRESSURE >= 140 MM HG: ICD-10-PCS | Mod: CPTII,NTX,, | Performed by: INTERNAL MEDICINE

## 2023-11-29 PROCEDURE — 99213 OFFICE O/P EST LOW 20 MIN: CPT | Mod: S$PBB,NTX,, | Performed by: INTERNAL MEDICINE

## 2023-11-29 PROCEDURE — 3008F PR BODY MASS INDEX (BMI) DOCUMENTED: ICD-10-PCS | Mod: CPTII,NTX,, | Performed by: INTERNAL MEDICINE

## 2023-11-29 PROCEDURE — 3075F PR MOST RECENT SYSTOLIC BLOOD PRESS GE 130-139MM HG: ICD-10-PCS | Mod: CPTII,NTX,, | Performed by: PHYSICIAN ASSISTANT

## 2023-11-29 PROCEDURE — 3077F SYST BP >= 140 MM HG: CPT | Mod: CPTII,NTX,, | Performed by: INTERNAL MEDICINE

## 2023-11-29 PROCEDURE — 71250 CT THORAX DX C-: CPT | Mod: TC,TXP

## 2023-11-29 PROCEDURE — 1160F RVW MEDS BY RX/DR IN RCRD: CPT | Mod: CPTII,NTX,, | Performed by: PHYSICIAN ASSISTANT

## 2023-11-29 PROCEDURE — 71250 CT CHEST WITHOUT CONTRAST: ICD-10-PCS | Mod: 26,NTX,, | Performed by: RADIOLOGY

## 2023-11-29 PROCEDURE — 3008F PR BODY MASS INDEX (BMI) DOCUMENTED: ICD-10-PCS | Mod: CPTII,NTX,, | Performed by: PHYSICIAN ASSISTANT

## 2023-11-29 RX ORDER — SULFASALAZINE 500 MG/1
1500 TABLET, DELAYED RELEASE ORAL 2 TIMES DAILY
Qty: 240 TABLET | Refills: 3 | Status: SHIPPED | OUTPATIENT
Start: 2023-11-29 | End: 2023-11-30 | Stop reason: SDUPTHER

## 2023-11-29 ASSESSMENT — ROUTINE ASSESSMENT OF PATIENT INDEX DATA (RAPID3)
MDHAQ FUNCTION SCORE: 1.7
TOTAL RAPID3 SCORE: 5.72
FATIGUE SCORE: 6.5
PSYCHOLOGICAL DISTRESS SCORE: 3.3
AM STIFFNESS SCORE: 1, YES
PAIN SCORE: 6
PATIENT GLOBAL ASSESSMENT SCORE: 5.5
WHEN YOU AWAKENED IN THE MORNING OVER THE LAST WEEK, PLEASE INDICATE THE AMOUNT OF TIME IT TAKES UNTIL YOU ARE AS LIMBER AS YOU WILL BE FOR THE DAY: 1 HOUR

## 2023-11-29 NOTE — PROGRESS NOTES
"Subjective:      Patient ID: Joaquín Rod is a 53 y.o. male.    Chief Complaint: Disease Management    53 year old man with PMH Seronegative RA with positive Anti CarP, HTN, HLD, SOB presents for follow up. Patient previously managed on Rituxan (last in march of 23'), as well as rinvoq, plaquenil, SSZ, Prednisone 10 mg daily. Patient was started on Rituxan and received this medication 2/24 and 3/10/23. He reported significant initial relief, Unfortunately he continued with joint pain, swelling, and stiffness throughout his body leading to difficulties cleaning, bathing, dressing himself etc at last appointment. Rinvoq was started at that time, patient reports a continued ~ 60% improvement in symptoms since starting rinvoq. He reports stable SOB and weakness as well. He plans to follow with [facility] regarding GGOs seen on recent CT lung.   Review of Systems   Constitutional:  Negative for activity change, appetite change, chills, fatigue and fever.   Respiratory:  Positive for shortness of breath (inproved). Negative for apnea, cough, choking, chest tightness, wheezing and stridor.    Cardiovascular:  Negative for chest pain and palpitations.   Gastrointestinal:  Negative for abdominal distention, abdominal pain, constipation, diarrhea and vomiting.   Musculoskeletal:  Positive for arthralgias, gait problem and joint swelling. Negative for back pain, neck pain and neck stiffness.   Skin:  Negative for color change, pallor, rash and wound.   Neurological:  Negative for seizures, syncope, facial asymmetry, light-headedness and headaches.      Objective:   BP (!) 142/91   Pulse 105   Ht 5' 8" (1.727 m)   Wt 107 kg (236 lb)   BMI 35.88 kg/m²   Physical Exam   Constitutional: normal appearance. He appears obese. No distress. He does not appear ill.   HENT:   Head: Normocephalic and atraumatic.   Nose: Nose normal. No rhinorrhea or nasal congestion.   Mouth/Throat: Mucous membranes are moist. No " oropharyngeal exudate or posterior oropharyngeal erythema. Oropharynx is clear.   Eyes: Pupils are equal, round, and reactive to light. Conjunctivae are normal. Right eye exhibits no discharge. Left eye exhibits no discharge. No scleral icterus.   Neck: Carotid bruit is not present.   Cardiovascular: Normal rate, regular rhythm, normal heart sounds and normal pulses. Exam reveals no gallop and no friction rub.   No murmur heard.  Pulmonary/Chest: Effort normal and breath sounds normal. No stridor. No respiratory distress. He has no wheezes. He has no rhonchi. He has no rales. He exhibits no tenderness.   Abdominal: Bowel sounds are normal. He exhibits no distension and no mass. There is no abdominal tenderness. There is no rebound and no guarding. No hernia.   Musculoskeletal:         General: Swelling and tenderness present. No deformity or signs of injury. Normal range of motion.      Cervical back: Normal range of motion and neck supple. No rigidity or tenderness.      Right lower leg: No edema.      Left lower leg: No edema.   Lymphadenopathy:     He has no cervical adenopathy.   Neurological: He is alert.   Skin: Skin is warm and dry.   Vitals reviewed.     5/3/2023 6/12/2023 7/13/2023 9/6/2023   Tender (JOSEPH-28) 22 / 28 27 / 28 24 / 28 23 / 28    Swollen (JOSEPH-28) 18 / 28  12 / 28  16 / 28  10 / 28    Provider Global 70 mm 80 mm -- 40 mm   Patient Global 75 mm 85 mm -- 55 mm   ESR 3 mm/hr 2 mm/hr -- --   CRP 0.4 mg/L 0.7 mg/L -- --   JOSEPH-28 (ESR) 5.63 (High disease activity) 5.55 (High disease activity) -- --   JOSEPH-28 (CRP) 5.95 (High disease activity) 6.22 (High disease activity) -- --   CDAI Score 54.5  55.5  -- 42.5         Assessment:     1. Rheumatoid arthritis, involving unspecified site, unspecified whether rheumatoid factor present    2. Weakness of lower extremity, unspecified laterality      Seronegative RF: Patient with positive CarP Ab. He has failed TNF, Orencia, rituxan, and Actemra. MTX/Arava  held secondary to liver concerns. On Rinvoq, SSZ, HCQ, prednisone 10 mg and improving/stable at this time.   -Continue HCQ and Rinvoq at this time, patient with ~ 60% improvement on this medication. Patient advised to get eyes checked yearly while on HCQ.   -Increase SSZ to 1500 mg bid   -Do not plan further use of rituximab at this time. Will check Rituxan sensitivity labs and likely DC bactrim if B cell return has taken place.   -Dexa with low risk osteopenia, do not plan to add alendronate at this time.      SOB: HRCT now with GGO in Right lung. He has an appt with pulmonology today. PFTs previously had been improved, pending re check today.   -Continue to follow with [facility], consider MMF if GGOs felt to be secondary to RA. Recent myomarker panel, Scleroderma serologies, ANCAs, etc noted.     LE weakness:   -Recheck CK/Aldolase/Ldh  -Recheck MRI femur and EMG.      3 mo follow up       Plan:     Problem List Items Addressed This Visit          Immunology/Multi System    Rheumatoid arthritis - Primary    Relevant Orders    CBC W/ AUTO DIFFERENTIAL    COMPREHENSIVE METABOLIC PANEL    Sedimentation rate    C-REACTIVE PROTEIN    CK    Aldolase    Lactate Dehydrogenase    Ambulatory referral/consult to Physical/Occupational Therapy    Rituxan Sensitivity     Other Visit Diagnoses       Weakness of lower extremity, unspecified laterality        Relevant Orders    MRI Femur Without Contrast Left    MRI Femur W WO Contrast Right    EMG W/ ULTRASOUND AND NERVE CONDUCTION TEST 2 Extremities          Roberto Carlos Tamez

## 2023-11-29 NOTE — PROGRESS NOTES
ADVANCED LUNG DISEASE CLINIC FOLLOW UP                                                                                                                                          Reason for Visit:  Evaluation     Referring Physician: Jessi Dias P*    History of Present Illness: Joaquín Rod is a 53 y.o. male who is on 0L of oxygen.  He is on CPAP at nighttime only for MARTI.  His New York Heart Association Class is I and a Karnofsky score of 80% - Normal activity with effort: some symptoms of disease. He is not diabetic.    Patient presents today for routine follow up. 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 Patient recently switched from rituxan to Rinvoq infusions. States he is primarily limited by his diffuse joint pains. Only has dyspnea with heavy exertion. Occasional orthopnea which is not new. Compliant with CPAP. Overall doing well. No recent hospitalizations/exacerbations.       Past Medical History:   Diagnosis Date    HLD (hyperlipidemia)     HTN (hypertension)     MARTI on CPAP     Rheumatoid arthritis, unspecified        No past surgical history on file.    Allergies: Patient has no known allergies.    Current Outpatient Medications   Medication Sig    amLODIPine (NORVASC) 10 MG tablet amlodipine 10 mg tablet   TAKE 1 TABLET BY MOUTH EVERY DAY    atorvastatin (LIPITOR) 20 MG tablet atorvastatin 20 mg tablet   TAKE 1 TABLET BY MOUTH EVERY DAY    budesonide (PULMICORT) 0.5 mg/2 mL nebulizer solution 2 mLs.    budesonide 1 mg/2 mL NbS budesonide 1 mg/2 mL suspension for nebulization   INHALE 2 ML TWICE A DAY BY NEBULIZATION ROUTE AS NEEDED.    busPIRone (BUSPAR) 10 MG tablet Take 10 mg by mouth 2 (two) times daily.    diazePAM (VALIUM) 5 MG tablet Take 1 tablet (5 mg total) by mouth every 6 (six) hours as needed for Anxiety.    fluticasone propionate (FLONASE) 50 mcg/actuation nasal spray fluticasone propionate 50 mcg/actuation nasal spray,suspension   SPRAY 2 SPRAYS INTO EACH  NOSTRIL EVERY DAY    hydrOXYchloroQUINE (PLAQUENIL) 200 mg tablet Take 1 tablet (200 mg total) by mouth 2 (two) times daily.    predniSONE (DELTASONE) 5 MG tablet Take 2 tablets (10 mg total) by mouth once daily.    sulfamethoxazole-trimethoprim 400-80mg (BACTRIM,SEPTRA) 400-80 mg per tablet Take 1 tablet by mouth every Mon, Wed, Fri.    sulfaSALAzine (AZULFIDINE) 500 MG EC tablet Take 3 tablets (1,500 mg total) by mouth 2 (two) times a day. Take 1 tab twice daily for one week, then increase to 2 tabs twice daily and continue    telmisartan-hydrochlorothiazide (MICARDIS HCT) 80-25 mg per tablet telmisartan 80 mg-hydrochlorothiazide 25 mg tablet   TAKE 1 TABLET BY MOUTH EVERY DAY    upadacitinib (RINVOQ) 15 mg 24 hr tablet TAKE 1 TABLET BY MOUTH 1 TIME A DAY.    varicella-zoster [id number], PF, (SHINGRIX) 50 mcg/0.5 mL injection Inject into the muscle.    venlafaxine (EFFEXOR) 37.5 MG Tab Take 37.5 mg by mouth.     No current facility-administered medications for this visit.       Immunization History   Administered Date(s) Administered    COVID-19 Vaccine 03/22/2021, 04/09/2021    COVID-19, MRNA, LN-S, PF (Pfizer) (Purple Cap) 03/22/2021, 04/09/2021    COVID-19, mRNA, LNP-S, bivalent booster, PF (PFIZER OMICRON) 11/14/2022    Influenza 10/08/2017, 11/13/2018, 09/23/2019    Influenza - Quadrivalent 11/13/2018, 09/23/2019, 09/22/2020, 10/25/2021, 09/29/2022    Influenza - Quadrivalent - PF *Preferred* (6 months and older) 09/06/2023    Influenza - Trivalent (ADULT) 11/06/2015, 12/14/2016    Influenza - Trivalent - PF (ADULT) 11/29/2006, 10/24/2008    MMR 05/24/2013    Pneumococcal Polysaccharide - 23 Valent 10/25/2021    Tdap 11/28/2017    Zoster Recombinant 07/13/2023, 09/06/2023     Family History:  No family history on file.  Social History     Substance and Sexual Activity   Alcohol Use Never      Social History     Substance and Sexual Activity   Drug Use Never      Social History     Socioeconomic History     Marital status:    Tobacco Use    Smoking status: Never     Passive exposure: Never    Smokeless tobacco: Current     Types: Chew   Substance and Sexual Activity    Alcohol use: Never    Drug use: Never     Review of Systems   Constitutional:  Negative for chills, diaphoresis, fever, malaise/fatigue and weight loss.   HENT:  Negative for congestion, ear discharge, ear pain, hearing loss, nosebleeds, sinus pain, sore throat and tinnitus.    Eyes:  Negative for blurred vision, double vision, photophobia, pain, discharge and redness.   Respiratory:  Positive for shortness of breath. Negative for cough, hemoptysis, sputum production, wheezing and stridor.    Cardiovascular:  Positive for leg swelling. Negative for chest pain, palpitations, orthopnea, claudication and PND.   Gastrointestinal:  Negative for abdominal pain, blood in stool, constipation, diarrhea, heartburn, melena, nausea and vomiting.   Genitourinary:  Negative for dysuria, flank pain, frequency, hematuria and urgency.   Musculoskeletal:  Positive for joint pain and myalgias. Negative for back pain, falls and neck pain.   Skin:  Negative for itching and rash.   Neurological: Negative.  Negative for dizziness, tingling, tremors, sensory change, speech change, focal weakness, seizures, loss of consciousness, weakness and headaches.   Endo/Heme/Allergies:  Negative for environmental allergies and polydipsia. Does not bruise/bleed easily.   Psychiatric/Behavioral: Negative.  Negative for depression, hallucinations, memory loss, substance abuse and suicidal ideas. The patient is not nervous/anxious and does not have insomnia.      Vitals  There were no vitals taken for this visit.  Physical Exam  Vitals and nursing note reviewed.   Constitutional:       General: He is not in acute distress.     Appearance: Normal appearance. He is normal weight. He is not ill-appearing.   HENT:      Head: Normocephalic and atraumatic.      Nose: Nose normal. No  congestion or rhinorrhea.   Eyes:      General: No scleral icterus.     Extraocular Movements: Extraocular movements intact.      Conjunctiva/sclera: Conjunctivae normal.   Cardiovascular:      Rate and Rhythm: Normal rate and regular rhythm.      Heart sounds: Normal heart sounds. No murmur heard.     No friction rub. No gallop.   Pulmonary:      Effort: Pulmonary effort is normal. No respiratory distress.      Breath sounds: Normal breath sounds. No stridor. No wheezing, rhonchi or rales.   Abdominal:      General: Abdomen is flat. Bowel sounds are normal. There is no distension.      Palpations: Abdomen is soft.      Tenderness: There is no abdominal tenderness.   Musculoskeletal:         General: Swelling (2+ pitting edema BLE bilaterally) present. Normal range of motion.      Cervical back: Normal range of motion and neck supple.      Right lower leg: No edema.      Left lower leg: No edema.   Skin:     General: Skin is warm and dry.   Neurological:      General: No focal deficit present.      Mental Status: He is alert and oriented to person, place, and time.      Cranial Nerves: No cranial nerve deficit.   Psychiatric:         Mood and Affect: Mood normal.         Behavior: Behavior normal.         Judgment: Judgment normal.         Labs:  No visits with results within 7 Day(s) from this visit.   Latest known visit with results is:   Lab Visit on 09/06/2023   Component Date Value    CPK 09/06/2023 239 (H)     Aldolase 09/06/2023 4.6     LD 09/06/2023 354 (H)     Sed Rate 09/06/2023 14     CRP 09/06/2023 <0.3     WBC 09/06/2023 4.39     RBC 09/06/2023 5.17     Hemoglobin 09/06/2023 15.8     Hematocrit 09/06/2023 47.8     MCV 09/06/2023 93     MCH 09/06/2023 30.6     MCHC 09/06/2023 33.1     RDW 09/06/2023 13.8     Platelets 09/06/2023 238     MPV 09/06/2023 12.0     Immature Granulocytes 09/06/2023 0.5     Gran # (ANC) 09/06/2023 2.8     Immature Grans (Abs) 09/06/2023 0.02     Lymph # 09/06/2023 1.1     Mono  # 09/06/2023 0.5     Eos # 09/06/2023 0.0     Baso # 09/06/2023 0.02     nRBC 09/06/2023 0     Gran % 09/06/2023 63.2     Lymph % 09/06/2023 24.4     Mono % 09/06/2023 11.4     Eosinophil % 09/06/2023 0.0     Basophil % 09/06/2023 0.5     Differential Method 09/06/2023 Automated     Sodium 09/06/2023 140     Potassium 09/06/2023 4.1     Chloride 09/06/2023 102     CO2 09/06/2023 25     Glucose 09/06/2023 116 (H)     BUN 09/06/2023 11     Creatinine 09/06/2023 1.0     Calcium 09/06/2023 9.6     Total Protein 09/06/2023 7.7     Albumin 09/06/2023 4.5     Total Bilirubin 09/06/2023 1.4 (H)     Alkaline Phosphatase 09/06/2023 62     AST 09/06/2023 28     ALT 09/06/2023 31     eGFR 09/06/2023 >60.0     Anion Gap 09/06/2023 13     Rituxan Sensitivity (CD2* 09/06/2023 Performed     CD20 Cell Expression Fin* 09/06/2023 SEE BELOW     IgG 09/06/2023 931     IgA 09/06/2023 188     IgM 09/06/2023 79     Hep B Core Total Ab 09/06/2023 Non-reactive     Hepatitis B Surface Ag 09/06/2023 Non-reactive     Hepatitis C Ab 09/06/2023 Non-reactive            11/29/2023     1:46 PM 6/12/2023     1:20 PM 2/2/2023     1:07 PM   Pulmonary Function Tests   FVC 3.75 liters 3.84 liters 3.19 liters   FEV1 3.22 liters 3.37 liters 2.73 liters   TLC (liters) 4.91 liters 4.92 liters 4.5 liters   DLCO (ml/mmHg sec) 22.3 ml/mmHg sec 25.12 ml/mmHg sec 20.54 ml/mmHg sec   FVC% 88.9 90.9 75.4   FEV1% 96.7 100.6 81.3   FEF 25-75 4.64 5 3.66   FEF 25-75% 153.1 163.9 119.2   TLC% 77.8 78 71.3   RV 1.16 1.08 0.9   RV% 54.1 50.7 42.2   DLCO% 82.7 92.5 75.6         2/2/2023    12:22 PM 8/17/2022    12:13 PM   6MW   6MWT Status completed without stopping completed without stopping   Patient Reported Dyspnea;Leg pain Dyspnea   Was O2 used? No No   6MW Distance walked (feet) 732 feet 800 feet   Distance walked (meters) 223.11 meters 243.84 meters   Did patient stop? No No   Oxygen Saturation 97 % 96 %   Supplemental Oxygen Room Air Room Air   Heart Rate 100 bpm  94 bpm   Blood Pressure 129/74 122/70   Hu Dyspnea Rating  very light moderate   Oxygen Saturation 97 % 96 %   Supplemental Oxygen Room Air Room Air   Heart Rate 131 bpm 116 bpm   Blood Pressure 130/77 119/72   Hu Dyspnea Rating  heavy heavy   Recovery Time (seconds) 142 seconds 72 seconds   Oxygen Saturation 98 % 96 %   Supplemental Oxygen Room Air Room Air   Heart Rate 110 bpm 104 bpm       Imaging:  Results for orders placed during the hospital encounter of 03/12/21    X-Ray Chest PA And Lateral    Narrative  EXAMINATION:  XR CHEST PA AND LATERAL    CLINICAL HISTORY:  Rheumatoid arthritis, unspecified    TECHNIQUE:  PA and lateral views of the chest were performed.    COMPARISON:  None    FINDINGS:  Cardiac size is normal.  Lungs are clear and no infiltrate is seen.    Impression  See above      Electronically signed by: Joaquín Caldera MD  Date:    03/12/2021  Time:    11:18    CT Chest Without Contrast 10/07/2022    Narrative  EXAMINATION:  CT CHEST WITHOUT CONTRAST    CLINICAL HISTORY:  Interstitial lung disease;ild; Interstitial pulmonary disease, unspecified    TECHNIQUE:  Low dose axial images, sagittal and coronal reformations were obtained from the thoracic inlet to the lung bases. Contrast was not administered.  Inspiratory, expiratory, and prone images were obtained.    COMPARISON:  CT chest 04/14/2021    FINDINGS:  Lungs/Pleura: Clear lungs.  No focal opacity or mass.  No pleural effusion or thickening.  Expiratory views demonstrate no evidence of air trapping.  The prone views are noncontributory.    Base of Neck: Unremarkable.    Thoracic soft tissues: Within normal limits.    Esophagus: Normal.    Airways: Patent.    Aorta: Left-sided aortic arch.  No aneurysm and no significant atherosclerosis.    Heart: Normal size. No effusion.    Pulmonary vasculature: Unremarkable.    Mercedez/Mediastinum: No pathologic amanda enlargement.    Upper Abdomen: There is a 6 cm hypodense hepatic lesion with  adjacent smaller hypodensities, similar to prior, likely hepatic cysts.    Bones: Within normal limits for age.    Impression  1.  No CT evidence for interstitial pneumonia.  2. circumscribed hypodense lesion within the liver, stable in consistent with hepatic cysts versus hemangiomas.    Electronically signed by resident: Ingrid Hernandez  Date:    10/07/2022  Time:    13:50    Electronically signed by: Elsi Segal  Date:    10/07/2022  Time:    17:15     Cardiodiagnostics:  Results for orders placed during the hospital encounter of 09/28/22    Echo    Interpretation Summary  · The left ventricle is normal in size with normal systolic function.  · The estimated ejection fraction is 65%.  · Normal left ventricular diastolic function.  · Normal right ventricular size with normal right ventricular systolic function.        Assessment:  1. Rheumatoid arthritis, involving unspecified site, unspecified whether rheumatoid factor present      Plan:     CT chest without evidence of ILD. FVC and DLCO stable. Continue current management for RA with MTX, plaquenil, prednisone and Rinvoq.     RTC in 6 months or sooner if needed. Repeat PFTs and 6MWT at that time.       Jessi Dias   11/29/2023

## 2023-11-29 NOTE — LETTER
November 29, 2023        Roberto Carlos Tamez  [address]  [facility] [address]  Phone: [phone]  Fax: [phone]             [facility] 1st [facility]  [address]  [facility] [address]  Phone: [phone]   Patient: Joaquín Rod   MR Number: [id number]   YOB: 1970   Date of Visit: 11/29/2023       Dear Dr. Roberto Carlos Tamez    Thank you for referring Joaquín Rod to me for evaluation. Attached you will find relevant portions of my assessment and plan of care.    If you have questions, please do not hesitate to call me. I look forward to following Joaquín Rod along with you.    Sincerely,    Jessi Dias PA-C    Enclosure    If you would like to receive this communication electronically, please contact [email or web address] or [phone] to request Reddit Link access.    Red[employer] Link is a tool which provides read-only access to select patient information with whom you have a relationship. Its easy to use and provides real time access to review your patients record including encounter summaries, notes, results, and demographic information.    If you feel you have received this communication in error or would no longer like to receive these types of communications, please e-mail [email or web address]

## 2023-11-30 ENCOUNTER — TELEPHONE (OUTPATIENT)
Dept: RHEUMATOLOGY | Facility: CLINIC | Age: 53
End: 2023-11-30
Payer: MEDICAID

## 2023-11-30 RX ORDER — SULFASALAZINE 500 MG/1
1500 TABLET, DELAYED RELEASE ORAL 2 TIMES DAILY
Qty: 540 TABLET | Refills: 0 | Status: SHIPPED | OUTPATIENT
Start: 2023-11-30 | End: 2024-02-14 | Stop reason: SDUPTHER

## 2023-11-30 RX ORDER — HYDROXYCHLOROQUINE SULFATE 200 MG/1
200 TABLET, FILM COATED ORAL 2 TIMES DAILY
Qty: 180 TABLET | Refills: 0 | Status: SHIPPED | OUTPATIENT
Start: 2023-11-30 | End: 2024-01-22 | Stop reason: SDUPTHER

## 2023-11-30 RX ORDER — UPADACITINIB 15 MG/1
15 TABLET, EXTENDED RELEASE ORAL DAILY
Qty: 30 TABLET | Refills: 2 | Status: ACTIVE | OUTPATIENT
Start: 2023-11-30 | End: 2024-02-14 | Stop reason: SDUPTHER

## 2023-11-30 RX ORDER — PREDNISONE 5 MG/1
10 TABLET ORAL DAILY
Qty: 180 TABLET | Refills: 1 | Status: SHIPPED | OUTPATIENT
Start: 2023-11-30 | End: 2024-02-14 | Stop reason: SDUPTHER

## 2023-11-30 NOTE — PROGRESS NOTES
I have personally reviewed the history, confirmed exam findings, and discussed assessment and plan with fellow.          Latest Reference Range & Units 11/29/23 12:57   WBC 3.90 - 12.70 K/uL 7.04   RBC 4.60 - 6.20 M/uL 5.01   Hemoglobin 14.0 - 18.0 g/dL 15.3   Hematocrit 40.0 - 54.0 % 45.6   MCV 82 - 98 fL 91   MCH 27.0 - 31.0 pg 30.5   MCHC 32.0 - 36.0 g/dL 33.6   RDW 11.5 - 14.5 % 13.9   Platelet Count 150 - 450 K/uL 172   MPV 9.2 - 12.9 fL 12.1   Gran % 38.0 - 73.0 % 65.0   Lymph % 18.0 - 48.0 % 19.6   Mono % 4.0 - 15.0 % 11.5   Eosinophil % 0.0 - 8.0 % 2.7   Basophil % 0.0 - 1.9 % 0.9   Immature Granulocytes 0.0 - 0.5 % 0.3   Gran # (ANC) 1.8 - 7.7 K/uL 4.6   Lymph # 1.0 - 4.8 K/uL 1.4   Mono # 0.3 - 1.0 K/uL 0.8   Eos # 0.0 - 0.5 K/uL 0.2   Baso # 0.00 - 0.20 K/uL 0.06   Immature Grans (Abs) 0.00 - 0.04 K/uL 0.02   nRBC 0 /100 WBC 0   Differential Method  Automated   Sed Rate 0 - 23 mm/Hr 4   Sodium 136 - 145 mmol/L 140   Potassium 3.5 - 5.1 mmol/L 4.2   Chloride 95 - 110 mmol/L 103   CO2 23 - 29 mmol/L 25   Anion Gap 8 - 16 mmol/L 12   BUN 6 - 20 mg/dL 13   Creatinine 0.5 - 1.4 mg/dL 0.9   eGFR >60 mL/min/1.73 m^2 >60.0   Glucose 70 - 110 mg/dL 92   Calcium 8.7 - 10.5 mg/dL 9.3   ALP 55 - 135 U/L 59   PROTEIN TOTAL 6.0 - 8.4 g/dL 7.4   Albumin 3.5 - 5.2 g/dL 4.5   BILIRUBIN TOTAL 0.1 - 1.0 mg/dL 1.0   AST 10 - 40 U/L 30   ALT 10 - 44 U/L 21   CRP 0.0 - 8.2 mg/L 1.1   CPK 20 - 200 U/L 475 (H)    - 260 U/L 322 (H)   (H): Data is abnormally high        Fibroscan 2/15/23:   Findings  Median liver stiffness score:  10.1  CAP Reading: dB/m:  380     IQR/med %:  13  Interpretation  Fibrosis interpretation is based on medial liver stiffness - Kilopascal (kPa).     Fibrosis Stage:  F3  Steatosis interpretation is based on controlled attenuation parameter - (dB/m).     Steatosis Grade:  S3  Comments/Plan:  F2-F3 fibrosis      PFTs        FVC     FEV1/FVC     TLC       RV      DLco  11/29/23  88.9       86                  77.8      54.1     82.7  6/12/23    90.9       88                 78         50.7     92.5  2/2/23      75.4       85                 71.3      42.2     75.6  8/17/22    83.9       88                 74.9      41.1     89.2  4/14/21    86.5       86                 75         52.1     95         HRCT chest 10/7/22:  Impression:     1.  No CT evidence for interstitial pneumonia.  2. circumscribed hypodense lesion within the liver, stable in consistent with hepatic cysts versus hemangiomas.     Electronically signed by resident: Ingrid Hernandez  Date:                                            10/07/2022  Time:                                           13:50      Three-view left foot    History: Chronic foot pain    TECHNIQUE: AP oblique and lateral left foot    FINDINGS: There is a lucency present at the base of the fifth metatarsal. It does have sclerotic margins but it's horizontal in nature and the findings consistent with an unhealed fracture.    IMPRESSION:   Fibrous nonunion fracture base fifth metatarsal    Finalized by Perfecto Ballard MD  7/18/2023 2:40 PM  by CRS  Procedure Note    Perfecto Ballard MD - 07/18/2023  Formatting of this note might be different from the original.  Three-view left foot    History: Chronic foot pain    TECHNIQUE: AP oblique and lateral left foot    FINDINGS: There is a lucency present at the base of the fifth metatarsal. It does have sclerotic margins but it's horizontal in nature and the findings consistent with an unhealed fracture.    IMPRESSION:   Fibrous nonunion fracture base fifth metatarsal    Finalized by Perfecto Ballard MD  7/18/2023 2:40 PM  by CRS    Exam End: 07/18/23 11:31     TTE:  6/2023  The left ventricle is normal in size with normal systolic function. The estimated ejection fraction is 60%.  Normal right ventricular size with normal right ventricular systolic function.  Normal left ventricular diastolic function.  The estimated PA systolic pressure is  22 mmHg.  Normal central venous pressure (3 mmHg).          Saw Dr. Duffy and Baltazar in Cardiology 7/26/23:    #Dyspnea on exertion  RESOLVED  - due to his RA medications  - symptoms have resolved since d/c of MTX and leflunomide     #HTN:  BP in clinic today at goal  - continue home amlodipine 5mg qD and telmisartan-HCTZ 80-25mg qD     #HLD:  Last LDL 90  - continue home atorvastatin 20mg qD     #RA:  - follows with rheumatology, on rynvoq and hcq      Methotrexate 25mg sc once a week since onset but held with concern for methotrexate contribution to restrictive lung disease but CT chest negative but never resumed. However fibroscan shows 2-3 fibrosis  Leflunomide 20mg daily continued  Prednisone 10mg daily has not been able to taper     Etanercept: ineffective  Adalimumab: ineffective  Abatacept:Ineffective  Tocilizumab: minimally effective  Rituximab 1000mg IV 2/24/23 and 3/10/23, only 2 weeks improvement, now back to baseline     Jakinibs: risk: obesity   The 10-year ASCVD risk score (Lucita BOYD, et al., 2019) is: 5.1%          RA RF- ACPA-NADYA+ CarP+  TJ 12(was 27)   SJ 8(was 12) ESR 4    CRP 1.1 DAS28 4.47(was 5.55)  XJL84-XAZ 4.73(was 6.22 (both MDA) CDAI 28(HDA) was 55.5  Methotrexate and leflunomide stopped 7/17/23 again with hepatic fibrosis on fibroscan  Hydroxychloroquine started 7/17/23  Sulfasalazine-EC 1000mg twice daily started 6/12/23  Upadacitinib 15mg daily started 7/17/23  Mild restrictive lung disease with normal HRCT chest, following with Dr. 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 followed by local Ortho  DXA low bone density FRAX hip o.9% MOF 8.9% low risk     *new Covid booster  Increase sulfasalazine-EC to 1500mg twice daily   Continue hydroxychloroquine 200mg twice daily  *Baseline Ophthalmology exam   Continue upadacitinib 15mg daily Cont prednisone 10mg daily given HDA  Can stop  Bactrim-DS M-W-F as do not plan further rituximab at this time depending on Rituxan sensitivity   Cont prednisone 10mg daily for now, plan to taper next visit  HRCT chest and appt in [facility] today  Repeat MRI thighs looking for myositis  EDS one arm and leg   '  Diagnostic muscle biopsy  CBC, CMP lipid panel  in 4 wks  RTC 3 months with CBC, CMP, ESR, CRP CK aldolase LD

## 2023-12-06 ENCOUNTER — PATIENT MESSAGE (OUTPATIENT)
Dept: HEPATOLOGY | Facility: CLINIC | Age: 53
End: 2023-12-06
Payer: MEDICAID

## 2024-01-22 NOTE — TELEPHONE ENCOUNTER
----- Message from Kayla Ludwig sent at 1/19/2024  4:21 PM CST -----    Patient Returning Call        Who Called:pt wife  Does the patient know what this is regarding?:prior authorization need for   hydroxychloroquine (PLAQUENIL) 200 mg tablet   Would the patient rather a call back or a response via [facility]? call  Best Call Back Number:[phone]  Additional Information: call back

## 2024-01-23 RX ORDER — HYDROXYCHLOROQUINE SULFATE 200 MG/1
200 TABLET, FILM COATED ORAL 2 TIMES DAILY
Qty: 180 TABLET | Refills: 3 | Status: SHIPPED | OUTPATIENT
Start: 2024-01-23 | End: 2024-02-14 | Stop reason: SDUPTHER

## 2024-02-14 ENCOUNTER — LAB VISIT (OUTPATIENT)
Dept: LAB | Facility: HOSPITAL | Age: 54
End: 2024-02-14
Payer: MEDICAID

## 2024-02-14 ENCOUNTER — PROCEDURE VISIT (OUTPATIENT)
Dept: HEPATOLOGY | Facility: CLINIC | Age: 54
End: 2024-02-14
Payer: MEDICAID

## 2024-02-14 ENCOUNTER — OFFICE VISIT (OUTPATIENT)
Dept: RHEUMATOLOGY | Facility: CLINIC | Age: 54
End: 2024-02-14
Payer: MEDICAID

## 2024-02-14 ENCOUNTER — OFFICE VISIT (OUTPATIENT)
Dept: SURGERY | Facility: CLINIC | Age: 54
End: 2024-02-14
Payer: MEDICAID

## 2024-02-14 VITALS
OXYGEN SATURATION: 96 % | WEIGHT: 246 LBS | DIASTOLIC BLOOD PRESSURE: 88 MMHG | SYSTOLIC BLOOD PRESSURE: 140 MMHG | HEART RATE: 80 BPM | BODY MASS INDEX: 39.53 KG/M2 | HEIGHT: 66 IN

## 2024-02-14 VITALS
BODY MASS INDEX: 38.62 KG/M2 | HEART RATE: 78 BPM | DIASTOLIC BLOOD PRESSURE: 85 MMHG | HEIGHT: 66 IN | WEIGHT: 240.31 LBS | SYSTOLIC BLOOD PRESSURE: 129 MMHG

## 2024-02-14 DIAGNOSIS — I10 ESSENTIAL HYPERTENSION: ICD-10-CM

## 2024-02-14 DIAGNOSIS — M06.9 RHEUMATOID ARTHRITIS, INVOLVING UNSPECIFIED SITE, UNSPECIFIED WHETHER RHEUMATOID FACTOR PRESENT: Primary | ICD-10-CM

## 2024-02-14 DIAGNOSIS — M06.9 RHEUMATOID ARTHRITIS, INVOLVING UNSPECIFIED SITE, UNSPECIFIED WHETHER RHEUMATOID FACTOR PRESENT: ICD-10-CM

## 2024-02-14 DIAGNOSIS — E66.01 MORBID OBESITY: ICD-10-CM

## 2024-02-14 DIAGNOSIS — E78.5 DYSLIPIDEMIA: ICD-10-CM

## 2024-02-14 DIAGNOSIS — M60.9 MYOSITIS, UNSPECIFIED MYOSITIS TYPE, UNSPECIFIED SITE: ICD-10-CM

## 2024-02-14 DIAGNOSIS — J84.9 INTERSTITIAL PULMONARY DISEASE, UNSPECIFIED: ICD-10-CM

## 2024-02-14 DIAGNOSIS — R29.898 WEAKNESS OF BOTH LOWER EXTREMITIES: Primary | ICD-10-CM

## 2024-02-14 DIAGNOSIS — R74.01 HIGH TRANSAMINASE LEVELS: ICD-10-CM

## 2024-02-14 DIAGNOSIS — M05.712 RHEUMATOID ARTHRITIS INVOLVING BOTH SHOULDERS WITH POSITIVE RHEUMATOID FACTOR: ICD-10-CM

## 2024-02-14 DIAGNOSIS — Z79.631 METHOTREXATE, LONG TERM, CURRENT USE: ICD-10-CM

## 2024-02-14 DIAGNOSIS — E80.6 BILIRUBINEMIA: ICD-10-CM

## 2024-02-14 DIAGNOSIS — M05.711 RHEUMATOID ARTHRITIS INVOLVING BOTH SHOULDERS WITH POSITIVE RHEUMATOID FACTOR: ICD-10-CM

## 2024-02-14 LAB
CHOLEST SERPL-MCNC: 185 MG/DL (ref 120–199)
CHOLEST/HDLC SERPL: 3.1 {RATIO} (ref 2–5)
HDLC SERPL-MCNC: 60 MG/DL (ref 40–75)
HDLC SERPL: 32.4 % (ref 20–50)
LDLC SERPL CALC-MCNC: 103.6 MG/DL (ref 63–159)
NONHDLC SERPL-MCNC: 125 MG/DL
TRIGL SERPL-MCNC: 107 MG/DL (ref 30–150)

## 2024-02-14 PROCEDURE — 3077F SYST BP >= 140 MM HG: CPT | Mod: CPTII,NTX,, | Performed by: STUDENT IN AN ORGANIZED HEALTH CARE EDUCATION/TRAINING PROGRAM

## 2024-02-14 PROCEDURE — 1160F RVW MEDS BY RX/DR IN RCRD: CPT | Mod: CPTII,NTX,, | Performed by: STUDENT IN AN ORGANIZED HEALTH CARE EDUCATION/TRAINING PROGRAM

## 2024-02-14 PROCEDURE — 99213 OFFICE O/P EST LOW 20 MIN: CPT | Mod: S$PBB,NTX,, | Performed by: INTERNAL MEDICINE

## 2024-02-14 PROCEDURE — 36415 COLL VENOUS BLD VENIPUNCTURE: CPT | Mod: TXP | Performed by: INTERNAL MEDICINE

## 2024-02-14 PROCEDURE — 99215 OFFICE O/P EST HI 40 MIN: CPT | Mod: PBBFAC,25,NTX | Performed by: STUDENT IN AN ORGANIZED HEALTH CARE EDUCATION/TRAINING PROGRAM

## 2024-02-14 PROCEDURE — 3079F DIAST BP 80-89 MM HG: CPT | Mod: CPTII,NTX,, | Performed by: STUDENT IN AN ORGANIZED HEALTH CARE EDUCATION/TRAINING PROGRAM

## 2024-02-14 PROCEDURE — 91200 LIVER ELASTOGRAPHY: CPT | Mod: 26,S$PBB,NTX, | Performed by: NURSE PRACTITIONER

## 2024-02-14 PROCEDURE — 1159F MED LIST DOCD IN RCRD: CPT | Mod: CPTII,NTX,, | Performed by: INTERNAL MEDICINE

## 2024-02-14 PROCEDURE — 88185 FLOWCYTOMETRY/TC ADD-ON: CPT | Mod: 59,TXP | Performed by: INTERNAL MEDICINE

## 2024-02-14 PROCEDURE — 1159F MED LIST DOCD IN RCRD: CPT | Mod: CPTII,NTX,, | Performed by: STUDENT IN AN ORGANIZED HEALTH CARE EDUCATION/TRAINING PROGRAM

## 2024-02-14 PROCEDURE — 3008F BODY MASS INDEX DOCD: CPT | Mod: CPTII,NTX,, | Performed by: STUDENT IN AN ORGANIZED HEALTH CARE EDUCATION/TRAINING PROGRAM

## 2024-02-14 PROCEDURE — 3008F BODY MASS INDEX DOCD: CPT | Mod: CPTII,NTX,, | Performed by: INTERNAL MEDICINE

## 2024-02-14 PROCEDURE — 3079F DIAST BP 80-89 MM HG: CPT | Mod: CPTII,NTX,, | Performed by: INTERNAL MEDICINE

## 2024-02-14 PROCEDURE — 99999 PR PBB SHADOW E&M-EST. PATIENT-LVL V: CPT | Mod: PBBFAC,TXP,, | Performed by: INTERNAL MEDICINE

## 2024-02-14 PROCEDURE — 99215 OFFICE O/P EST HI 40 MIN: CPT | Mod: PBBFAC,25,27,TXP | Performed by: INTERNAL MEDICINE

## 2024-02-14 PROCEDURE — 99999 PR PBB SHADOW E&M-EST. PATIENT-LVL V: CPT | Mod: PBBFAC,TXP,, | Performed by: STUDENT IN AN ORGANIZED HEALTH CARE EDUCATION/TRAINING PROGRAM

## 2024-02-14 PROCEDURE — 99203 OFFICE O/P NEW LOW 30 MIN: CPT | Mod: S$PBB,NTX,, | Performed by: STUDENT IN AN ORGANIZED HEALTH CARE EDUCATION/TRAINING PROGRAM

## 2024-02-14 PROCEDURE — 80061 LIPID PANEL: CPT | Mod: NTX | Performed by: INTERNAL MEDICINE

## 2024-02-14 PROCEDURE — 91200 LIVER ELASTOGRAPHY: CPT | Mod: PBBFAC,NTX | Performed by: NURSE PRACTITIONER

## 2024-02-14 PROCEDURE — 3074F SYST BP LT 130 MM HG: CPT | Mod: CPTII,NTX,, | Performed by: INTERNAL MEDICINE

## 2024-02-14 RX ORDER — HYDROXYCHLOROQUINE SULFATE 200 MG/1
200 TABLET, FILM COATED ORAL 2 TIMES DAILY
Qty: 180 TABLET | Refills: 1 | Status: SHIPPED | OUTPATIENT
Start: 2024-02-14

## 2024-02-14 RX ORDER — DULOXETIN HYDROCHLORIDE 30 MG/1
30 CAPSULE, DELAYED RELEASE ORAL DAILY
Qty: 30 CAPSULE | Refills: 11 | Status: SHIPPED | OUTPATIENT
Start: 2024-02-14 | End: 2024-02-14

## 2024-02-14 RX ORDER — SULFASALAZINE 500 MG/1
1500 TABLET, DELAYED RELEASE ORAL 2 TIMES DAILY
Qty: 540 TABLET | Refills: 0 | Status: SHIPPED | OUTPATIENT
Start: 2024-02-14 | End: 2024-05-22 | Stop reason: SDUPTHER

## 2024-02-14 RX ORDER — METOPROLOL SUCCINATE 25 MG/1
TABLET, EXTENDED RELEASE ORAL
COMMUNITY

## 2024-02-14 RX ORDER — SERTRALINE HYDROCHLORIDE 50 MG/1
1 TABLET, FILM COATED ORAL DAILY
COMMUNITY
End: 2024-02-14

## 2024-02-14 RX ORDER — FOLIC ACID 1 MG/1
1000 TABLET ORAL
COMMUNITY
Start: 2024-01-19 | End: 2024-02-14

## 2024-02-14 RX ORDER — SODIUM CHLORIDE 9 MG/ML
INJECTION, SOLUTION INTRAVENOUS CONTINUOUS
OUTPATIENT
Start: 2024-02-14

## 2024-02-14 RX ORDER — PREDNISONE 5 MG/1
10 TABLET ORAL DAILY
Qty: 180 TABLET | Refills: 1 | Status: SHIPPED | OUTPATIENT
Start: 2024-02-14

## 2024-02-14 RX ORDER — CEFAZOLIN SODIUM 2 G/50ML
2 SOLUTION INTRAVENOUS
OUTPATIENT
Start: 2024-02-14

## 2024-02-14 RX ORDER — UPADACITINIB 15 MG/1
15 TABLET, EXTENDED RELEASE ORAL DAILY
Qty: 30 TABLET | Refills: 2 | Status: ACTIVE | OUTPATIENT
Start: 2024-02-14 | End: 2024-04-19 | Stop reason: SDUPTHER

## 2024-02-14 RX ORDER — ONDANSETRON HYDROCHLORIDE 2 MG/ML
4 INJECTION, SOLUTION INTRAVENOUS EVERY 12 HOURS PRN
OUTPATIENT
Start: 2024-02-14

## 2024-02-14 RX ORDER — SULFAMETHOXAZOLE AND TRIMETHOPRIM 400; 80 MG/1; MG/1
1 TABLET ORAL
Qty: 540 TABLET | Refills: 0 | Status: SHIPPED | OUTPATIENT
Start: 2024-02-14

## 2024-02-14 ASSESSMENT — ROUTINE ASSESSMENT OF PATIENT INDEX DATA (RAPID3)
PSYCHOLOGICAL DISTRESS SCORE: 4.4
TOTAL RAPID3 SCORE: 7.66
MDHAQ FUNCTION SCORE: 2.1
PAIN SCORE: 8
FATIGUE SCORE: 10
AM STIFFNESS SCORE: 1, YES
PATIENT GLOBAL ASSESSMENT SCORE: 8

## 2024-02-14 NOTE — PATIENT INSTRUCTIONS
CBC, CMP, ESR, CRP, CK  aldolase LD lipid panel today  *new Covid booster  *Prevnar 20  *Ask  Dr. Gamboa about changing Effexor to duloxetine 30mg daily and if tolerated increase to 60mg daily  Continue  sulfasalazine-EC to 1500mg twice daily   Continue hydroxychloroquine 200mg twice daily  *Baseline Ophthalmology exam re-ordered  Continue upadacitinib 15mg daily Cont prednisone 10mg daily given HDA  Continue  Bactrim-DS M-W-F as B cells still absent on Rituxan sensitivity 11/29/23  Cont prednisone 10mg daily for now, plan to taper next visit  Repeat MRI thighs/femurs looking for myositis as ordered 11/29/23  EMG/NCV one arm and leg  as ordered 11/29/23  prior to planned muscle biopsy  Defer planned muscle biopsy until after EMG and MRI thighs to choose best biopsy site. Reschedule after these  Ref to [facility](Dr. Sanchez) pt and wife request for evaluation of left 5th metatarsal base Fx non-union.   [id number] karen Mediterranean diet  RTC 3 months with CBC, CMP, ESR, CRP CK aldolase LD

## 2024-02-14 NOTE — PROGRESS NOTES
[facility]  General Surgery  History & Physical  Date: 02/14/2024  Referring Provider: Roberto Carlos Tamez    SUBJECTIVE:     Chief complaint:   Chief Complaint   Patient presents with    Consult       History of Present Illness:  Patient is a 53 y.o. male with a history HTN, HLD, rheumatoid arthritis, and MARTI (cpap at night) who presents with progressive weakness more pronounced in his bilateral lower extremities vs upper extremities. Onset of symptoms was gradual starting several years ago with gradually worsening course since that time. Patient denies a history of trauma, seizures, neurological disease, or new arthritis symptoms. Symptoms are aggravated by moving around and walking. Symptoms improve partially with taking his medications for his RA. He was referred to my clinic for a muscle biopsy.    He has a history of an open right inguinal hernia repair done about 22 years ago.     He is a non-smoker and does not drink much alcohol.     He has no history of an MI or stroke. He is not on any anticoagulation. He has never had a muscle biopsy before.    Review of patient's allergies indicates:  No Known Allergies    Current Outpatient Medications   Medication Sig Dispense Refill    amLODIPine (NORVASC) 10 MG tablet amlodipine 10 mg tablet   TAKE 1 TABLET BY MOUTH EVERY DAY      atorvastatin (LIPITOR) 20 MG tablet atorvastatin 20 mg tablet   TAKE 1 TABLET BY MOUTH EVERY DAY      budesonide (PULMICORT) 0.5 mg/2 mL nebulizer solution 2 mLs.      budesonide 1 mg/2 mL [facility] budesonide 1 mg/2 mL suspension for nebulization   INHALE 2 ML TWICE A DAY BY NEBULIZATION ROUTE AS NEEDED.      busPIRone (BUSPAR) 10 MG tablet Take 10 mg by mouth 2 (two) times daily.      diazePAM (VALIUM) 5 MG tablet Take 1 tablet (5 mg total) by mouth every 6 (six) hours as needed for Anxiety. 5 tablet 0    fluticasone propionate (FLONASE) 50 mcg/actuation nasal spray fluticasone propionate 50 mcg/actuation nasal  spray,suspension   SPRAY 2 SPRAYS INTO EACH NOSTRIL EVERY DAY      hydroxychloroquine (PLAQUENIL) 200 mg tablet Take 1 tablet (200 mg total) by mouth 2 (two) times daily. 180 tablet 3    metoprolol succinate (TOPROL-XL) 25 MG 24 hr tablet TAKE 1 TABLET BY MOUTH EVERY DAY FOR BLOOD PRESSURE > 140/90 OR HEART RATE > 100      predniSONE (DELTASONE) 5 MG tablet Take 2 tablets (10 mg total) by mouth once daily. 180 tablet 1    sulfamethoxazole-trimethoprim 400-80mg (BACTRIM,SEPTRA) 400-80 mg per tablet Take 1 tablet by mouth every Mon, Wed, Fri. 36 tablet 3    sulfaSALAzine (AZULFIDINE) 500 MG EC tablet Take 3 tablets (1,500 mg total) by mouth 2 (two) times a day. 540 tablet 0    telmisartan-hydrochlorothiazide (MICARDIS HCT) 80-25 mg per tablet telmisartan 80 mg-hydrochlorothiazide 25 mg tablet   TAKE 1 TABLET BY MOUTH EVERY DAY      upadacitinib (RINVOQ) 15 mg 24 hr tablet Take 1 tablet (15 mg total) by mouth once daily. 30 tablet 2    varicella-zoster gE-AS01B, PF, (SHINGRIX) 50 mcg/0.5 mL injection Inject into the muscle. 1 each 0    venlafaxine (EFFEXOR) 37.5 MG Tab Take 37.5 mg by mouth.       No current facility-administered medications for this visit.       Past Medical History:   Diagnosis Date    HLD (hyperlipidemia)     HTN (hypertension)     MARTI on CPAP     Rheumatoid arthritis, unspecified      No past surgical history on file.  No family history on file.  Social History     Tobacco Use    Smoking status: Never     Passive exposure: Never    Smokeless tobacco: Current     Types: Chew    Tobacco comments:     Chew - sometimes - not as much as he used to   Substance Use Topics    Alcohol use: Never    Drug use: Never        Review of Systems:  A detailed review of systems has been reviewed with the patient, pertinent positives and negatives are presented in the note and is otherwise negative.  Review of Systems   Constitutional:  Negative for activity change, fatigue and fever.   HENT:  Negative for  "congestion, rhinorrhea and trouble swallowing.    Eyes:  Negative for discharge.   Respiratory:  Negative for cough, chest tightness and shortness of breath.    Cardiovascular:  Negative for chest pain and palpitations.   Gastrointestinal:  Negative for anal bleeding, constipation, diarrhea, nausea and vomiting.   Endocrine: Negative for cold intolerance and heat intolerance.   Genitourinary:  Negative for decreased urine volume, difficulty urinating, dysuria and urgency.   Musculoskeletal:  Negative for back pain, joint swelling and neck pain.   Skin:  Negative for color change and wound.   Neurological:  Positive for weakness. Negative for syncope and light-headedness.   Hematological:  Negative for adenopathy. Does not bruise/bleed easily.       OBJECTIVE:     Vital Signs (Most Recent)  Pulse: 80 (02/14/24 1025)  BP: (!) 140/88 (02/14/24 1025)  SpO2: 96 % (02/14/24 1025)  5' 6" (1.676 m)  111.6 kg (246 lb)     Physical Exam:  Physical Exam  Constitutional:       Appearance: Normal appearance. He is not ill-appearing or toxic-appearing.   HENT:      Head: Normocephalic and atraumatic.   Eyes:      General: Vision grossly intact. No scleral icterus.     Extraocular Movements: Extraocular movements intact.   Neck:      Trachea: Trachea and phonation normal.   Cardiovascular:      Rate and Rhythm: Normal rate and regular rhythm.      Heart sounds: Normal heart sounds.   Pulmonary:      Effort: Pulmonary effort is normal.      Breath sounds: Normal breath sounds. No decreased breath sounds.   Chest:      Chest wall: No mass.   Abdominal:      General: Abdomen is flat. Bowel sounds are normal. There is no distension.      Palpations: Abdomen is soft. There is no mass.   Musculoskeletal:         General: No swelling or tenderness. Normal range of motion.      Cervical back: Normal range of motion and neck supple. No muscular tenderness.   Lymphadenopathy:      Cervical: No cervical adenopathy.      Lower Body: No right " inguinal adenopathy. No left inguinal adenopathy.   Skin:     General: Skin is warm and dry.      Capillary Refill: Capillary refill takes less than 2 seconds.      Coloration: Skin is not jaundiced.      Findings: No bruising or erythema.   Neurological:      General: No focal deficit present.      Mental Status: He is alert and oriented to person, place, and time.      Motor: Weakness present. No atrophy or seizure activity.   Psychiatric:         Mood and Affect: Mood normal.         Behavior: Behavior normal. Behavior is cooperative.         Laboratory:  I personally and independently reviewed relevant lab test results, including the following:  CBC11/29/2023: Reviewed - within nml limits  CMP 11/29/2023: Reviewed - within nml limits  LDH 11/29/2023: Reviewed - elevated  CK 11/29/2023: Reviewed - elevated    Diagnostic Results:  I personally and independently reviewed, visualized and interpreted the images of the below listed radiology studies and my findings are notable for:  None relevant    ASSESSMENT/PLAN:   Joaquín Rod is a 53 year-old man with a history of rheumatoid arthritis, HTN, HLD, MARTI on cpap and RA who presents with progressive muscle weakness    PLAN:  -Given that has his RLE is more symptomatic, we discussed proceeding with a muscle biopsy in the OR. The risks and benefits of the procedure were discussed with him and he agrees to proceed. Will plan for right anterior thigh muscle biopsy 2/20/24. Informed consent obtained  -Preop orders placed      Nichole Gonzalez MD, FACS  General Surgery and Surgical Critical Care  [facility]

## 2024-02-14 NOTE — PROGRESS NOTES
I have personally reviewed the history, confirmed exam findings, and discussed assessment and plan with fellow.       Fibroscan 2/15/23:   Findings  Median liver stiffness score:  10.1  CAP Reading: dB/m:  380     IQR/med %:  13  Interpretation  Fibrosis interpretation is based on medial liver stiffness - Kilopascal (kPa).     Fibrosis Stage:  F3  Steatosis interpretation is based on controlled attenuation parameter - (dB/m).     Steatosis Grade:  S3  Comments/Plan:  F2-F3 fibrosis        PFTs        FVC     FEV1/FVC     TLC       RV      DLco  11/29/23  88.9       86                 77.8      54.1     82.7  6/12/23    90.9       88                 78         50.7     92.5  2/2/23      75.4       85                 71.3      42.2     75.6  8/17/22    83.9       88                 74.9      41.1     89.2  4/14/21    86.5       86                 75         52.1     95        EXAMINATION:  CT CHEST WITHOUT CONTRAST     CLINICAL HISTORY:  Interstitial lung disease; Interstitial pulmonary disease, unspecified     TECHNIQUE:  Low dose axial images, sagittal and coronal reformations were obtained from the thoracic inlet to the lung bases. Contrast was not administered.     COMPARISON:  10/05/2023     FINDINGS:  The thyroid appears normal.  The main central airways are patent.  The esophagus appears normal.  The heart is normal in size.  No pericardial effusion.  Small mediastinal lymph nodes, not enlarged by CT criteria.  No hilar or axillary adenopathy is seen.     Evaluation of the lungs is somewhat limited secondary to respiratory motion artifact.  The previously noted ground-glass opacity in the right upper lobe is no longer seen on today's study.  No parenchymal consolidation or pleural effusions.     Hepatic cysts.     Impression:     Interval resolution of the ground glass in the right upper lobe.  No new process is seen.     Hepatic cysts.        Electronically signed by: Fabby Gordillo MD  Date:                                             12/01/2023  Time:                                           16:21     Three-view left foot    History: Chronic foot pain    TECHNIQUE: AP oblique and lateral left foot    FINDINGS: There is a lucency present at the base of the fifth metatarsal. It does have sclerotic margins but it's horizontal in nature and the findings consistent with an unhealed fracture.    IMPRESSION:   Fibrous nonunion fracture base fifth metatarsal    Finalized by Perfecto Ballard MD  7/18/2023 2:40 PM  by CRS  Procedure Note     Perfecto Ballard MD - 07/18/2023  Formatting of this note might be different from the original.  Three-view left foot    History: Chronic foot pain    TECHNIQUE: AP oblique and lateral left foot    FINDINGS: There is a lucency present at the base of the fifth metatarsal. It does have sclerotic margins but it's horizontal in nature and the findings consistent with an unhealed fracture.    IMPRESSION:   Fibrous nonunion fracture base fifth metatarsal    Finalized by Perfecto Ballard MD  7/18/2023 2:40 PM  by CRS     Exam End: 07/18/23 11:31      TTE:  6/12/23:  The left ventricle is normal in size with normal systolic function. The estimated ejection fraction is 60%.  Normal right ventricular size with normal right ventricular systolic function.  Normal left ventricular diastolic function.  The estimated PA systolic pressure is 22 mmHg.  Normal central venous pressure (3 mmHg).              Saw Dr. Dean in Cardiology 7/26/23:     #Dyspnea on exertion  RESOLVED  - due to his RA medications  - symptoms have resolved since d/c of MTX and leflunomide     #HTN:  BP in clinic today at goal  - continue home amlodipine 5mg qD and telmisartan-HCTZ 80-25mg qD     #HLD:  Last LDL 90  - continue home atorvastatin 20mg qD     #RA:  - follows with rheumatology, on rynvoq and hcq     Saw Jessi Dias in [facility] 11/29/23:           Methotrexate 25mg sc once a week since onset but held with  concern for methotrexate contribution to restrictive lung disease but CT chest negative but never resumed. However fibroscan shows 2-3 fibrosis  Leflunomide 20mg daily stopped  Prednisone 10mg daily has not been able to taper     Etanercept: ineffective  Adalimumab: ineffective  Abatacept:Ineffective  Tocilizumab: minimally effective  Rituximab 1000mg IV 2/24/23 and 3/10/23, only 2 weeks improvement, now back to baseline     Jakinibs: risk: obesity   The 10-year ASCVD risk score (Lucita BOYD, et al., 2019) is: 5.1%              RA RF- ACPA-NADYA+ CarP+  TJ 18  SJ 0 ESR    CRP  DAS28 5.55)  TYF09-NEE CDAI 27.5(HDA)  Methotrexate and leflunomide stopped 7/17/23 again with hepatic fibrosis on fibroscan  Hydroxychloroquine started 7/17/23  Sulfasalazine-EC 1000mg twice daily started 6/12/23  Upadacitinib 15mg daily started 7/17/23  Mild restrictive lung disease with normal HRCT chest, following with Jessi Dias and previously  in [facility] PFTs stable but for decreasing but still normal DLco  *Intermittently elevated CK, normal myositis autoantibodies, normal MRI thighs but severe proximal muscle weakness LEs  NAFLD with apparent stage 2-3 fibrosis by fibroscan  followed in Hepatology Brittany Schofield  methotrexate and leflunomide stopped again 7/17/23  S/p Strongyloides Rx with ivermectin  ASCVD 5.1%(low)  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      CBC, CMP, ESR, CRP, CK  aldolase LD lipid panel today  *new Covid booster  *Prevnar 20  *discuss with Dr. Gamboa changing venlafaxine 37.5mg to duloxetine 30mg daily and if tolerated increase to 60mg daily  Continue  sulfasalazine-EC to 1500mg twice daily   Continue hydroxychloroquine 200mg twice daily  *Baseline Ophthalmology exam re-ordered  Continue upadacitinib 15mg daily Cont  prednisone 10mg daily given HDA  Continue  Bactrim-DS M-W-F as B cells still absent on Rituxan sensitivity 11/29/23  Cont prednisone 10mg daily for now, plan to taper next visit  Repeat MRI thighs/femurs looking for myositis as ordered 11/29/23  EMG/NCV one arm and leg  as ordered 11/29/23  prior to planned muscle biopsy  Defer planned muscle biopsy until after EMG and MRI thighs to choose best biopsy site. Reschedule after these  Ref to [facility](Dr. Sanchez) pt and wife request for evaluation of left 5th metatarsal base Fx non-union.   [id number] karen Mediterranean diet  RTC 3 months with CBC, CMP, ESR, CRP CK aldolase LD

## 2024-02-15 ENCOUNTER — PATIENT MESSAGE (OUTPATIENT)
Dept: HEPATOLOGY | Facility: CLINIC | Age: 54
End: 2024-02-15
Payer: MEDICAID

## 2024-02-15 NOTE — PROCEDURES
FibroScan [place] (Vibration Controlled Transient Elastography)    Date/Time: 2/14/2024 10:45 AM    Performed by: Yolande Brown NP  Authorized by: Brittany Schofield NP    Diagnosis:  NAFLD    Probe:  XL    Universal Protocol: Patient's identity, procedure and site were verified, confirmatory pause was performed.  Discussed procedure including risks and potential complications.  Questions answered.  Patient verbalizes understanding and wishes to proceed with VCTE.     Procedure: After providing explanations of the procedure, patient was placed in the supine position with right arm in maximum abduction to allow optimal exposure of right lateral abdomen.  Patient was briefly assessed, Testing was performed in the mid-axillary location, 50Hz Shear Wave pulses were applied and the resulting Shear Wave and Propagation Speed detected with a 3.5 MHz ultrasonic signal, using the FibroScan probe, Skin to liver capsule distance and liver parenchyma were accessed during the entire examination with the FibroScan probe, Patient was instructed to breathe normally and to abstain from sudden movements during the procedure, allowing for random measurements of liver stiffness. At least 10 Shear Waves were produced, Individual measurements of each Shear Wave were calculated.  Patient tolerated the procedure well with no complications.  Meets discharge criteria as was dismissed.  Rates pain 0 out of 10.  Patient will follow up with ordering provider to review results.    Findings  Median liver stiffness score:  5.8  CAP Reading: dB/m:  305    IQR/med %:  12  Interpretation  Fibrosis interpretation is based on medial liver stiffness - Kilopascal (kPa).    Fibrosis Stage:  F 0-1  Steatosis interpretation is based on controlled attenuation parameter - (dB/m).    Steatosis Grade:  S3

## 2024-02-16 ENCOUNTER — TELEPHONE (OUTPATIENT)
Dept: SURGERY | Facility: CLINIC | Age: 54
End: 2024-02-16
Payer: MEDICAID

## 2024-02-16 LAB
PATH REPORT.FINAL DX SPEC: NORMAL
RITUXAN SENSITIVITY (CD20): NORMAL

## 2024-02-16 NOTE — TELEPHONE ENCOUNTER
Spoke with pt informing him that procedure will now be placed on hold til after 3/8 MRI. Email sent to scheduling Dept.

## 2024-02-16 NOTE — TELEPHONE ENCOUNTER
----- Message from Danielle Granda RN sent at 2/16/2024  1:22 PM CST -----  Regarding: SURGERY CANCELLATION/RESCHEDULE  Good afternoon,       I just spoke with Bree,the wife of patient Marcel who is scheduled for a muscle biopsy on Tuesday 2/20/2024.She reported that the muscle biopsy was supposed to be cancelled for 2/20/2024 and rescheduled for a day after his MRI which is on 3/8/2024.It is still present on the schedule for Tuesday .                     Thank you,                            Danielle Granda,RN               [facility]

## 2024-02-19 NOTE — PROGRESS NOTES
"Subjective:      Patient ID: Joaquín Rod is a 53 y.o. male.    Chief Complaint: Follow-up (3m follow up, pain in hands, feet, and legs)    53 year old man with PMH Seronegative RA with positive Anti CarP, HTN, HLD, SOB presents for follow up. Patient previously managed on Rituxan (last in march of 23'), as well as rinvoq, plaquenil, SSZ, Prednisone 10 mg daily. Patient was started on Rituxan and received this medication 2/24 and 3/10/23. He reported significant initial relief, Unfortunately he continued with joint pain, swelling, and stiffness throughout his body leading to difficulties cleaning, bathing, dressing himself etc at last appointment. Rinvoq was started at that time, patient reports a continued ~ 60% improvement in symptoms since starting rinvoq. He reports stable SOB and weakness as well. He plans to follow with [facility]. WPI 12, SSS 10.   Review of Systems   Constitutional:  Negative for activity change, appetite change, chills, fatigue and fever.   Respiratory:  Positive for shortness of breath (inproved). Negative for apnea, cough, choking, chest tightness, wheezing and stridor.    Cardiovascular:  Negative for chest pain and palpitations.   Gastrointestinal:  Negative for abdominal distention, abdominal pain, constipation, diarrhea and vomiting.   Musculoskeletal:  Positive for arthralgias, gait problem and joint swelling. Negative for back pain, neck pain and neck stiffness.   Skin:  Negative for color change, pallor, rash and wound.   Neurological:  Negative for seizures, syncope, facial asymmetry, light-headedness and headaches.      Objective:   /85 (BP Location: Left arm, Patient Position: Sitting, BP Method: Medium (Automatic))   Pulse 78   Ht 5' 6" (1.676 m)   Wt 109 kg (240 lb 4.8 oz)   BMI 38.79 kg/m²   Physical Exam   Constitutional: normal appearance. He appears obese. No distress. He does not appear ill.   HENT:   Head: Normocephalic and atraumatic.   Nose: Nose " normal. No rhinorrhea or nasal congestion.   Mouth/Throat: Mucous membranes are moist. No oropharyngeal exudate or posterior oropharyngeal erythema. Oropharynx is clear.   Eyes: Pupils are equal, round, and reactive to light. Conjunctivae are normal. Right eye exhibits no discharge. Left eye exhibits no discharge. No scleral icterus.   Neck: Carotid bruit is not present.   Cardiovascular: Normal rate, regular rhythm, normal heart sounds and normal pulses. Exam reveals no gallop and no friction rub.   No murmur heard.  Pulmonary/Chest: Effort normal and breath sounds normal. No stridor. No respiratory distress. He has no wheezes. He has no rhonchi. He has no rales. He exhibits no tenderness.   Abdominal: Bowel sounds are normal. He exhibits no distension and no mass. There is no abdominal tenderness. There is no rebound and no guarding. No hernia.   Musculoskeletal:         General: Swelling and tenderness present. No deformity or signs of injury. Normal range of motion.      Cervical back: Normal range of motion and neck supple. No rigidity or tenderness.      Right lower leg: No edema.      Left lower leg: No edema.   Lymphadenopathy:     He has no cervical adenopathy.   Neurological: He is alert.   Skin: Skin is warm and dry.   Vitals reviewed.     7/13/2023 9/6/2023 11/29/2023 2/14/2024   Tender (JOSEPH-28) 24 / 28 23 / 28 12 / 28 18 / 28    Swollen (JOSEPH-28) 16 / 28  10 / 28  8 / 28  0 / 28    Provider Global -- 40 mm 25 mm 15 mm   Patient Global -- 55 mm 55 mm 80 mm   ESR -- -- 4 mm/hr --   CRP -- -- 1.1 mg/L --   JOSEPH-28 (ESR) -- -- 4.47 (Moderate disease activity) --   JOSEPH-28 (CRP) -- -- 4.73 (Moderate disease activity) --   CDAI Score -- 42.5  28  27.5         Assessment:     1. Rheumatoid arthritis, involving unspecified site, unspecified whether rheumatoid factor present    2. Interstitial pulmonary disease, unspecified    3. Morbid obesity    4. Rheumatoid arthritis involving both shoulders with positive  rheumatoid factor              Seronegative RF: Patient with positive CarP Ab. He has failed TNF, Orencia, rituxan, and Actemra. MTX/Arava held secondary to liver concerns. On Rinvoq, SSZ, HCQ, prednisone 10 mg and improving/stable at this time.   -Continue HCQ and Rinvoq at this time, patient with ~ 60% improvement on this medication. Patient advised to get eyes checked yearly while on HCQ.   -Continue SSZ to 1500 mg bid   -Do not plan further use of rituximab at this time. Will check Rituxan sensitivity labs and likely DC bactrim if B cell return has taken place.   -Dexa with low risk osteopenia, do not plan to add alendronate at this time.   -Check labs including lipid panel at this time.   -Check MRI hand to assess for dz activity     SOB: HRCT now with GGO in Right lung. Follows with pulmonology. PFTs noted  -Continue to follow with [facility]     LE weakness:   -Recheck MRI femur and EMG.   -Defer plan for msk bx until after MRI/EMG     3 mo follow up          Plan:     Problem List Items Addressed This Visit          Pulmonary    Interstitial pulmonary disease, unspecified    Relevant Orders    Ambulatory referral/consult to Ophthalmology    Ambulatory referral/consult to Orthopedics    LIPID PANEL (Completed)    Rituxan Sensitivity (Completed)    MRI Hand Wrist W WO Bilat_Rheumatoid       Immunology/Multi System    Rheumatoid disease - Primary    Relevant Orders    Ambulatory referral/consult to Ophthalmology    Ambulatory referral/consult to Orthopedics    LIPID PANEL (Completed)    Rituxan Sensitivity (Completed)    MRI Hand Wrist W WO Bilat_Rheumatoid    Ambulatory referral/consult to Bariatric/Obesity Medicine    Rheumatoid arthritis    Relevant Medications    predniSONE (DELTASONE) 5 MG tablet    sulfamethoxazole-trimethoprim 400-80mg (BACTRIM,SEPTRA) 400-80 mg per tablet    upadacitinib (RINVOQ) 15 mg 24 hr tablet    Other Relevant Orders    Ambulatory referral/consult to Ophthalmology    Ambulatory  referral/consult to Orthopedics    LIPID PANEL (Completed)    Rituxan Sensitivity (Completed)    MRI Hand Wrist W WO Bilat_Rheumatoid    Ambulatory referral/consult to Bariatric/Obesity Medicine       Endocrine    Morbid obesity    Relevant Orders    Ambulatory referral/consult to Bariatric/Obesity Medicine     Roberto Carlos Tamez

## 2024-04-05 DIAGNOSIS — M06.9 RHEUMATOID ARTHRITIS, INVOLVING UNSPECIFIED SITE, UNSPECIFIED WHETHER RHEUMATOID FACTOR PRESENT: Primary | ICD-10-CM

## 2024-04-05 DIAGNOSIS — J84.9 INTERSTITIAL PULMONARY DISEASE, UNSPECIFIED: ICD-10-CM

## 2024-04-05 NOTE — PROGRESS NOTES
Per provider note November 2203 Dr. Lemos/Lashonda RICHARDS, patient RTC 6 months with PFTs, 6MWT. Start on room air, modified. Request to .

## 2024-04-12 ENCOUNTER — HOSPITAL ENCOUNTER (OUTPATIENT)
Dept: RADIOLOGY | Facility: HOSPITAL | Age: 54
Discharge: HOME | End: 2024-04-12
Attending: INTERNAL MEDICINE

## 2024-04-12 DIAGNOSIS — J84.9 INTERSTITIAL PULMONARY DISEASE, UNSPECIFIED: ICD-10-CM

## 2024-04-12 DIAGNOSIS — R29.898 WEAKNESS OF LOWER EXTREMITY, UNSPECIFIED LATERALITY: ICD-10-CM

## 2024-04-12 DIAGNOSIS — M06.9 RHEUMATOID ARTHRITIS, INVOLVING UNSPECIFIED SITE, UNSPECIFIED WHETHER RHEUMATOID FACTOR PRESENT: ICD-10-CM

## 2024-04-12 PROCEDURE — A9585 GADOBUTROL INJECTION: HCPCS | Mod: TXP | Performed by: INTERNAL MEDICINE

## 2024-04-12 PROCEDURE — 73719 MRI LOWER EXTREMITY W/DYE: CPT | Mod: 26,LT,NTX, | Performed by: RADIOLOGY

## 2024-04-12 PROCEDURE — 73719 MRI LOWER EXTREMITY W/DYE: CPT | Mod: 26,RT,NTX, | Performed by: RADIOLOGY

## 2024-04-12 PROCEDURE — 25500020 PHARM REV CODE 255: Mod: TXP | Performed by: INTERNAL MEDICINE

## 2024-04-12 PROCEDURE — 73720 MRI LWR EXTREMITY W/O&W/DYE: CPT | Mod: TC,RT,NTX

## 2024-04-12 PROCEDURE — 73720 MRI LWR EXTREMITY W/O&W/DYE: CPT | Mod: TC,LT,NTX

## 2024-04-12 RX ORDER — GADOBUTROL 604.72 MG/ML
10 INJECTION INTRAVENOUS
Status: COMPLETED | OUTPATIENT
Start: 2024-04-12 | End: 2024-04-12

## 2024-04-12 RX ADMIN — GADOBUTROL 10 ML: 604.72 INJECTION INTRAVENOUS at 01:04

## 2024-04-18 ENCOUNTER — TELEPHONE (OUTPATIENT)
Dept: RHEUMATOLOGY | Facility: CLINIC | Age: 54
End: 2024-04-18

## 2024-04-18 NOTE — TELEPHONE ENCOUNTER
Informed patient's wife-  Per -Tell them I just received the [facility] paperwork and signed and was faxed back. No samples and no substitute. Could send Rx to his local pharmacy for 2 wk supply    Thank you,   Bernice     ----- Message from Mendez Nava sent at 4/18/2024  3:34 PM CDT -----  Type: General Call Back    Name of Caller:Patients wife  Reason Medication Rinvoq  Would the patient rather a call back or a response via [facility]? call  Best Call Back Number: [phone]  Additional Information: Patient states that  is out of Rinvoq medication and due to the insurance, it will take at least two weeks to receive medication.  Patient would like to know if there are samples a different medication that you can provide.     Also patients wife states that paperwork for the Rinvoq medication is being sent to your office from [facility].  Patients wife would like for someone to please give her a call as soon as possible to advise and provide further assist.  Patients wife states that she also left a message on Tuesday.  Please call as soon as possible.

## 2024-04-19 DIAGNOSIS — M05.711 RHEUMATOID ARTHRITIS INVOLVING BOTH SHOULDERS WITH POSITIVE RHEUMATOID FACTOR: ICD-10-CM

## 2024-04-19 DIAGNOSIS — M05.712 RHEUMATOID ARTHRITIS INVOLVING BOTH SHOULDERS WITH POSITIVE RHEUMATOID FACTOR: ICD-10-CM

## 2024-04-19 RX ORDER — UPADACITINIB 15 MG/1
15 TABLET, EXTENDED RELEASE ORAL DAILY
Qty: 15 TABLET | Refills: 1 | Status: ACTIVE | OUTPATIENT
Start: 2024-04-19

## 2024-05-16 RX ORDER — FOLIC ACID 1 MG/1
1000 TABLET ORAL
Qty: 180 TABLET | Refills: 5 | Status: SHIPPED | OUTPATIENT
Start: 2024-05-16

## 2024-05-17 ENCOUNTER — PATIENT MESSAGE (OUTPATIENT)
Dept: RHEUMATOLOGY | Facility: CLINIC | Age: 54
End: 2024-05-17

## 2024-05-22 ENCOUNTER — TELEPHONE (OUTPATIENT)
Dept: TRANSPLANT | Facility: CLINIC | Age: 54
End: 2024-05-22

## 2024-05-22 ENCOUNTER — HOSPITAL ENCOUNTER (OUTPATIENT)
Dept: PULMONOLOGY | Facility: CLINIC | Age: 54
Discharge: HOME | End: 2024-05-22

## 2024-05-22 ENCOUNTER — OFFICE VISIT (OUTPATIENT)
Dept: RHEUMATOLOGY | Facility: CLINIC | Age: 54
End: 2024-05-22

## 2024-05-22 ENCOUNTER — OFFICE VISIT (OUTPATIENT)
Dept: TRANSPLANT | Facility: CLINIC | Age: 54
End: 2024-05-22

## 2024-05-22 VITALS
DIASTOLIC BLOOD PRESSURE: 69 MMHG | HEIGHT: 66 IN | OXYGEN SATURATION: 97 % | BODY MASS INDEX: 41.45 KG/M2 | BODY MASS INDEX: 41.45 KG/M2 | HEIGHT: 66 IN | TEMPERATURE: 99 F | HEART RATE: 68 BPM | SYSTOLIC BLOOD PRESSURE: 128 MMHG | RESPIRATION RATE: 20 BRPM | WEIGHT: 257.94 LBS | WEIGHT: 257.94 LBS

## 2024-05-22 VITALS
SYSTOLIC BLOOD PRESSURE: 151 MMHG | HEIGHT: 68 IN | HEART RATE: 69 BPM | DIASTOLIC BLOOD PRESSURE: 91 MMHG | WEIGHT: 257.94 LBS | BODY MASS INDEX: 39.09 KG/M2

## 2024-05-22 DIAGNOSIS — J84.9 INTERSTITIAL PULMONARY DISEASE, UNSPECIFIED: ICD-10-CM

## 2024-05-22 DIAGNOSIS — M06.9 RHEUMATOID ARTHRITIS, INVOLVING UNSPECIFIED SITE, UNSPECIFIED WHETHER RHEUMATOID FACTOR PRESENT: Primary | ICD-10-CM

## 2024-05-22 DIAGNOSIS — M06.9 RHEUMATOID ARTHRITIS, INVOLVING UNSPECIFIED SITE, UNSPECIFIED WHETHER RHEUMATOID FACTOR PRESENT: ICD-10-CM

## 2024-05-22 DIAGNOSIS — J84.9 INTERSTITIAL PULMONARY DISEASE, UNSPECIFIED: Primary | ICD-10-CM

## 2024-05-22 PROCEDURE — 94727 GAS DIL/WSHOT DETER LNG VOL: CPT | Mod: PBBFAC,NTX | Performed by: INTERNAL MEDICINE

## 2024-05-22 PROCEDURE — 94010 BREATHING CAPACITY TEST: CPT | Mod: PBBFAC,NTX | Performed by: INTERNAL MEDICINE

## 2024-05-22 PROCEDURE — 94727 GAS DIL/WSHOT DETER LNG VOL: CPT | Mod: 26,S$PBB,NTX, | Performed by: INTERNAL MEDICINE

## 2024-05-22 PROCEDURE — 99215 OFFICE O/P EST HI 40 MIN: CPT | Mod: PBBFAC,TXP,25 | Performed by: PHYSICIAN ASSISTANT

## 2024-05-22 PROCEDURE — 99214 OFFICE O/P EST MOD 30 MIN: CPT | Mod: 25,S$PBB,NTX, | Performed by: PHYSICIAN ASSISTANT

## 2024-05-22 PROCEDURE — 94618 PULMONARY STRESS TESTING: CPT | Mod: PBBFAC,NTX | Performed by: INTERNAL MEDICINE

## 2024-05-22 PROCEDURE — 94010 BREATHING CAPACITY TEST: CPT | Mod: 26,59,S$PBB,NTX | Performed by: INTERNAL MEDICINE

## 2024-05-22 PROCEDURE — 99999 PR PBB SHADOW E&M-EST. PATIENT-LVL V: CPT | Mod: PBBFAC,TXP,, | Performed by: PHYSICIAN ASSISTANT

## 2024-05-22 PROCEDURE — 94729 DIFFUSING CAPACITY: CPT | Mod: 26,S$PBB,NTX, | Performed by: INTERNAL MEDICINE

## 2024-05-22 PROCEDURE — 94618 PULMONARY STRESS TESTING: CPT | Mod: 26,S$PBB,NTX, | Performed by: INTERNAL MEDICINE

## 2024-05-22 PROCEDURE — 99215 OFFICE O/P EST HI 40 MIN: CPT | Mod: PBBFAC,27,TXP | Performed by: INTERNAL MEDICINE

## 2024-05-22 PROCEDURE — 99213 OFFICE O/P EST LOW 20 MIN: CPT | Mod: S$PBB,NTX,, | Performed by: INTERNAL MEDICINE

## 2024-05-22 PROCEDURE — 99999 PR PBB SHADOW E&M-EST. PATIENT-LVL V: CPT | Mod: PBBFAC,TXP,, | Performed by: INTERNAL MEDICINE

## 2024-05-22 PROCEDURE — 94729 DIFFUSING CAPACITY: CPT | Mod: PBBFAC,NTX | Performed by: INTERNAL MEDICINE

## 2024-05-22 RX ORDER — SULFASALAZINE 500 MG/1
1500 TABLET, DELAYED RELEASE ORAL 2 TIMES DAILY
Qty: 540 TABLET | Refills: 1 | Status: SHIPPED | OUTPATIENT
Start: 2024-05-22

## 2024-05-22 NOTE — PROCEDURES
Joaquín Rod is a 53 y.o.  male patient, who presents for a 6 minute walk test ordered by DO Mone.  The diagnosis is Connective Tissue Disease.  The patient's BMI is 41.7 kg/m2.  Predicted distance (lower limit of normal) is 378.3 meters.      Test Results:    The test was completed without stopping.  The total time walked was 360 seconds.  During walking, the patient reported:  Dyspnea, Leg pain.  The patient used a walker during testing.     05/22/2024---------Distance: 198.12 meters (650 feet)     Lap Walk Time O2 Sat % Supplemental Oxygen Heart Rate Blood Pressure Hu Scale   Pre-exercise  (Resting) 0 0 92 % Room Air 69 bpm 155/94 mmHg 1   During Exercise 1 108 sec 97 % Room Air 97 bpm     During Exercise 2 221 sec 96 % Room Air 94 bpm     During Exercise 3 340 sec 97 % Room Air 98 bpm     End of Exercise  360 sec 98 % Room Air 99 bpm 134/77 mmHg 5-6   Post-exercise  (Recovery)   98 % Room Air  76 bpm       Recovery Time: 103 seconds    Performing nurse/tech: VILMA Hanna      PREVIOUS STUDY:   02/02/2023---------Distance: 223.11 meters (732 feet)       Time O2 Sat % Supplemental Oxygen Heart Rate Blood Pressure Hu Scale   Pre-exercise  (Resting) 0 97 % Room Air 100 bpm 129/74 mmHg 1   During Exercise 1 min 95 % Room Air 127 bpm       During Exercise 2 min 96 % Room Air 126 bpm       During Exercise 3 min 97 % Room Air 128 bpm       During Exercise 4 min 96 % Room Air 131 bpm       During Exercise 5 min 95 % Room Air 132 bpm       During Exercise 6 min 97 % Room Air 131 bpm 130/77 mmHg 5-6   Post-exercise  (Recovery)   98 % Room Air  110 bpm           CLINICAL INTERPRETATION:  Six minute walk distance is 198.12 meters (650 feet) with heavy dyspnea.  At rest, oxygen saturation was moderately decreased while breathing room air.  During exercise, there was no desaturation while breathing room air.  Blood pressure decreased significantly and Heart rate increased significantly with walking.  This  may represent a hypertensive response to exercise.  The patient reported non-pulmonary symptoms during exercise.  Significant exercise impairment is likely due to cardiovascular causes and subjective symptoms.  The patient did complete the study, walking 360 seconds of the 360 second test.  Since the previous study in February 2023, exercise capacity may be somewhat worse.  Based upon age and body mass index, exercise capacity is less than predicted.

## 2024-05-22 NOTE — PROGRESS NOTES
I have personally reviewed the history, confirmed exam findings, and discussed assessment and plan with fellow.     Out of upadacitinib x 4-6 wks, just resumed last Friday. Also out of sulfasalazine x several weeks so increased pain and swelling mcps, wrists, bilateral shoulder and right>left hip pain  Having severe pain right hip, difficulty getting into and out of tub                     R           L  Deltoids     4            4.6  Biceps        4.6        4.8  Triceps       4.8        4.8              4.4         4.4  Psoas         4            4.4  Quad          4.4         4.4        Latest Reference Range & Units 02/14/24 13:45   Cholesterol Total 120 - 199 mg/dL 185   HDL 40 - 75 mg/dL 60   HDL/Cholesterol Ratio 20.0 - 50.0 % 32.4   Non-HDL Cholesterol mg/dL 125   Total Cholesterol/HDL Ratio 2.0 - 5.0  3.1   Triglycerides 30 - 150 mg/dL 107   LDL Cholesterol 63.0 - 159.0 mg/dL 103.6   The 10-year ASCVD risk score (Lucita DK, et al., 2019) is: 6.1%    Values used to calculate the score:      Age: 53 years      Sex: Male      Is Non- : No      Diabetic: No      Tobacco smoker: No      Systolic Blood Pressure: 151 mmHg      Is BP treated: Yes      HDL Cholesterol: 60 mg/dL      Total Cholesterol: 185 mg/dL       Fibroscan 2/15/23:   Findings  Median liver stiffness score:  10.1  CAP Reading: dB/m:  380     IQR/med %:  13  Interpretation  Fibrosis interpretation is based on medial liver stiffness - Kilopascal (kPa).     Fibrosis Stage:  F3  Steatosis interpretation is based on controlled attenuation parameter - (dB/m).     Steatosis Grade:  S3  Comments/Plan:  F2-F3 fibrosis        PFTs        FVC     FEV1/FVC     TLC       RV      DLco      11/29/23  88.9       86                 77.8      54.1     82.7  6/12/23    90.9       88                 78         50.7     92.5  2/2/23      75.4       85                 71.3      42.2     75.6  8/17/22    83.9       88                 74.9       41.1     89.2  4/14/21    86.5       86                 75         52.1     95        EXAMINATION:  CT CHEST WITHOUT CONTRAST     CLINICAL HISTORY:  Interstitial lung disease; Interstitial pulmonary disease, unspecified     TECHNIQUE:  Low dose axial images, sagittal and coronal reformations were obtained from the thoracic inlet to the lung bases. Contrast was not administered.     COMPARISON:  10/05/2023     FINDINGS:  The thyroid appears normal.  The main central airways are patent.  The esophagus appears normal.  The heart is normal in size.  No pericardial effusion.  Small mediastinal lymph nodes, not enlarged by CT criteria.  No hilar or axillary adenopathy is seen.     Evaluation of the lungs is somewhat limited secondary to respiratory motion artifact.  The previously noted ground-glass opacity in the right upper lobe is no longer seen on today's study.  No parenchymal consolidation or pleural effusions.     Hepatic cysts.     Impression:     Interval resolution of the ground glass in the right upper lobe.  No new process is seen.     Hepatic cysts.        Electronically signed by: Fabby Gordillo MD  Date:                                            12/01/2023  Time:                                           16:21     Three-view left foot    History: Chronic foot pain    TECHNIQUE: AP oblique and lateral left foot    FINDINGS: There is a lucency present at the base of the fifth metatarsal. It does have sclerotic margins but it's horizontal in nature and the findings consistent with an unhealed fracture.    IMPRESSION:   Fibrous nonunion fracture base fifth metatarsal    Finalized by Perfecto Ballard MD  7/18/2023 2:40 PM  by CRS  Procedure Note     Perfecto Ballard MD - 07/18/2023  Formatting of this note might be different from the original.  Three-view left foot    History: Chronic foot pain    TECHNIQUE: AP oblique and lateral left foot    FINDINGS: There is a lucency present at the base of the fifth  metatarsal. It does have sclerotic margins but it's horizontal in nature and the findings consistent with an unhealed fracture.    IMPRESSION:   Fibrous nonunion fracture base fifth metatarsal    Finalized by Perfecto Ballard MD  7/18/2023 2:40 PM  by CRS     Exam End: 07/18/23 11:31      TTE:  6/12/23:  The left ventricle is normal in size with normal systolic function. The estimated ejection fraction is 60%.  Normal right ventricular size with normal right ventricular systolic function.  Normal left ventricular diastolic function.  The estimated PA systolic pressure is 22 mmHg.  Normal central venous pressure (3 mmHg).      EXAMINATION:  MRI FEMUR W WO CONTRAST RIGHT; MRI FEMUR W WO CONTRAST LEFT     CLINICAL HISTORY:  myositis;  Other symptoms and signs involving the musculoskeletal system     TECHNIQUE:  Routine multisequence multiplanar MRI soft tissue bilateral thigh protocol performed with the administration of 10 cc of Gadavist IV contrast.     COMPARISON:  None     FINDINGS:  The musculature of the thigh demonstrates normal signal.  No atrophy or strain.  No abnormal enhancement.  No fluid collections or masses.  No inguinal lymphadenopathy.  The hamstring muscle complex origin is unremarkable.  The marrow signal is within normal limits.     Impression:     No evidence of myositis in the thighs.        Electronically signed by:Hemant Danielle MD  Date:                                            04/12/2024  Time:                                           14:52        Saw Dr. Duffy and Baltazar in Cardiology 7/26/23:     #Dyspnea on exertion  RESOLVED  - due to his RA medications  - symptoms have resolved since d/c of MTX and leflunomide     #HTN:  BP in clinic today at goal  - continue home amlodipine 5mg qD and telmisartan-HCTZ 80-25mg qD     #HLD:  Last LDL 90  - continue home atorvastatin 20mg qD     #RA:  - follows with rheumatology, on rynvoq and hcq     Saw Jessi Dias in [facility] 11/29/23:       Fibroscan 2/15/23:   Findings  Median liver stiffness score:  10.1  CAP Reading: dB/m:  380     IQR/med %:  13  Interpretation  Fibrosis interpretation is based on medial liver stiffness - Kilopascal (kPa).     Fibrosis Stage:  F3  Steatosis interpretation is based on controlled attenuation parameter - (dB/m).     Steatosis Grade:  S3  Comments/Plan:  F2-F3 fibrosis        PFTs        FVC     FEV1/FVC     TLC       RV      DLco  11/29/23  88.9       86                 77.8      54.1     82.7  6/12/23    90.9       88                 78         50.7     92.5  2/2/23      75.4       85                 71.3      42.2     75.6  8/17/22    83.9       88                 74.9      41.1     89.2  4/14/21    86.5       86                 75         52.1     95        EXAMINATION:  CT CHEST WITHOUT CONTRAST     CLINICAL HISTORY:  Interstitial lung disease; Interstitial pulmonary disease, unspecified     TECHNIQUE:  Low dose axial images, sagittal and coronal reformations were obtained from the thoracic inlet to the lung bases. Contrast was not administered.     COMPARISON:  10/05/2023     FINDINGS:  The thyroid appears normal.  The main central airways are patent.  The esophagus appears normal.  The heart is normal in size.  No pericardial effusion.  Small mediastinal lymph nodes, not enlarged by CT criteria.  No hilar or axillary adenopathy is seen.     Evaluation of the lungs is somewhat limited secondary to respiratory motion artifact.  The previously noted ground-glass opacity in the right upper lobe is no longer seen on today's study.  No parenchymal consolidation or pleural effusions.     Hepatic cysts.     Impression:     Interval resolution of the ground glass in the right upper lobe.  No new process is seen.     Hepatic cysts.        Electronically signed by: Fabby Gordillo MD  Date:                                            12/01/2023  Time:                                           16:21     Three-view left  foot    History: Chronic foot pain    TECHNIQUE: AP oblique and lateral left foot    FINDINGS: There is a lucency present at the base of the fifth metatarsal. It does have sclerotic margins but it's horizontal in nature and the findings consistent with an unhealed fracture.    IMPRESSION:   Fibrous nonunion fracture base fifth metatarsal    Finalized by Perfecto Ballard MD  7/18/2023 2:40 PM  by CRS  Procedure Note     Perfecto Ballard MD - 07/18/2023  Formatting of this note might be different from the original.  Three-view left foot    History: Chronic foot pain    TECHNIQUE: AP oblique and lateral left foot    FINDINGS: There is a lucency present at the base of the fifth metatarsal. It does have sclerotic margins but it's horizontal in nature and the findings consistent with an unhealed fracture.    IMPRESSION:   Fibrous nonunion fracture base fifth metatarsal    Finalized by Perfecto Ballard MD  7/18/2023 2:40 PM  by CRS     Exam End: 07/18/23 11:31      TTE:  6/12/23:  The left ventricle is normal in size with normal systolic function. The estimated ejection fraction is 60%.  Normal right ventricular size with normal right ventricular systolic function.  Normal left ventricular diastolic function.  The estimated PA systolic pressure is 22 mmHg.  Normal central venous pressure (3 mmHg).              Saw Dr. Dean in Cardiology 7/26/23:     #Dyspnea on exertion  RESOLVED  - due to his RA medications  - symptoms have resolved since d/c of MTX and leflunomide     #HTN:  BP in clinic today at goal  - continue home amlodipine 5mg qD and telmisartan-HCTZ 80-25mg qD     #HLD:  Last LDL 90  - continue home atorvastatin 20mg qD     #RA:  - follows with rheumatology, on rynvoq and hcq     Saw Jsesi Dias in [facility] 11/29/23:         ASSESSMENT:     Methotrexate 25mg sc once a week since onset but held with concern for methotrexate contribution to restrictive lung disease but CT chest negative but never  resumed. However fibroscan shows 2-3 fibrosis  Leflunomide 20mg daily stopped  Prednisone 10mg daily has not been able to taper     Etanercept: ineffective  Adalimumab: ineffective  Abatacept:Ineffective  Tocilizumab: minimally effective  Rituximab 1000mg IV 2/24/23 and 3/10/23, only 2 weeks improvement, now back to baseline     Jakinibs: risk: obesity   The 10-year ASCVD risk score (Lucita BOYD, et al., 2019) is: 5.1%              RA RF- ACPA-NADYA+ CarP+  TJ 18  SJ 0 ESR    CRP  DAS28 5.55)  NRT25-YZD CDAI 27.5(HDA)  Methotrexate and leflunomide stopped 7/17/23 again with hepatic fibrosis on fibroscan  Hydroxychloroquine started 7/17/23  Sulfasalazine-EC 1000mg twice daily started 6/12/23  Upadacitinib 15mg daily started 7/17/23  Mild restrictive lung disease with normal HRCT chest, following with Jessi Dias and previously  in [facility] PFTs stable but for decreasing but still normal DLco  *Intermittently elevated CK, normal myositis autoantibodies, normal MRI thighs but severe proximal muscle weakness LEs  MAFLD with  stage 2-3 fibrosis by fibroscan  followed in [facility]  methotrexate and leflunomide stopped again 7/17/23  S/p Strongyloides Rx with ivermectin  ASCVD 5.1%(low)  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        PLAN:     CBC, CMP, ESR, CRP, CK  aldolase LD lipid panel today  *new Covid booster  *Prevnar 20  *discuss with Dr. Gamboa changing venlafaxine 37.5mg to duloxetine 30mg daily and if tolerated increase to 60mg daily  Continue  sulfasalazine-EC to 1500mg twice daily   Continue hydroxychloroquine 200mg twice daily  *Baseline Ophthalmology exam re-ordered  Continue upadacitinib 15mg daily Cont prednisone 10mg daily given HDA  Continue  Bactrim-DS M-W-F as B cells still absent on Rituxan  sensitivity 11/29/23  Cont prednisone 10mg daily for now, plan to taper next visit  Repeat MRI thighs/femurs looking for myositis as ordered 11/29/23  EMG/NCV one arm and leg  as ordered 11/29/23  prior to planned muscle biopsy  Defer planned muscle biopsy until after EMG and MRI thighs to choose best biopsy site. Reschedule after these  Ref to [facility](Dr. Sanchez) pt and wife request for evaluation of left 5th metatarsal base Fx non-union.   [id number] karen Mediterranean diet  RTC 3 months with CBC, CMP, ESR, CRP CK aldolase LD         Methotrexate 25mg sc once a week since onset but held with concern for methotrexate contribution to restrictive lung disease but CT chest negative but never resumed. However fibroscan shows 2-3 fibrosis  Leflunomide 20mg daily stopped  Prednisone 10mg daily has not been able to taper     Etanercept: ineffective  Adalimumab: ineffective  Abatacept:Ineffective  Tocilizumab: minimally effective  Rituximab 1000mg IV 2/24/23 and 3/10/23, only 2 weeks improvement, now back to baseline     Jakinibs: risk: obesity   The 10-year ASCVD risk score (Lucita BOYD, et al., 2019) is: 5.1%              RA RF- ACPA-NADYA+ CarP+  TJ   SJ  ESR    CRP  DAS28  OVO87-VRS CDAI   Methotrexate and leflunomide stopped 7/17/23 again with hepatic fibrosis on fibroscan  Hydroxychloroquine started 7/17/23  Sulfasalazine-EC 1000mg twice daily started 6/12/23  Upadacitinib 15mg daily started 7/17/23  Mild restrictive lung disease with normal HRCT chest, following with Jessi Dias and previously  in [facility] PFTs stable but for decreasing but still normal DLco  *Intermittently elevated CK, normal myositis autoantibodies, normal MRI thighs but severe proximal muscle weakness LEs  NAFLD with apparent stage 2-3 fibrosis by fibroscan  followed in [facility]  methotrexate and leflunomide stopped again 7/17/23  S/p Strongyloides Rx with ivermectin  ASCVD 5.1%(low)  Left foot with  fibrous non-union fracture base 5th metatarsal followed by local Ortho  DXA low bone density FRAX hip 0.9% MOF 8.9% low risk  Fibromyalgia WPI 12 SSS 10  Class 2 obesity Body mass index is 38.79 kg/m².      CBC, CMP, ESR, CRP, CK  aldolase, hydroxychloroquine blood level Rituxan sensitivity today after Pulmonary visit  *Covid vaccine declines  Ref to PT  *discuss with Dr. Gamboa changing venlafaxine 37.5mg to duloxetine 30mg daily and if tolerated increase to 60mg daily  resume sulfasalazine-EC to 1500mg twice daily   Continue hydroxychloroquine 200mg twice daily  *Baseline Ophthalmology exam re-ordered  Just resumed  upadacitinib 15mg daily getting from [employer], approved for a year  Cont prednisone 10mg daily given HDA until re-established on upadacitinib  Continue  Bactrim-DS M-W-F pending Rituxan sensitivity   *EMG/NCV one arm and leg  as ordered 11/29/23  prior to planned muscle biopsy  Defer planned muscle biopsy until after EMG  to choose best biopsy site especially as MRI thighs normal.  Ref to [facility](Dr. Sanchez) pt and wife request for evaluation of left 5th metatarsal base Fx non-union.   [id number] karen Mediterranean diet  RTC 3 months with CBC, CMP, ESR, CRP CK aldolase LD

## 2024-05-22 NOTE — PATIENT INSTRUCTIONS
CBC, CMP, ESR, CRP, CK  aldolase, hydroxychloroquine blood level Rituxan sensitivity today after Pulmonary visit  *Covid vaccine declines  Ref to PT  *discuss with Dr. Gamboa changing venlafaxine 37.5mg to duloxetine 30mg daily and if tolerated increase to 60mg daily  resume sulfasalazine-EC to 1500mg twice daily   Continue hydroxychloroquine 200mg twice daily  *Baseline Ophthalmology exam re-ordered  Just resumed  upadacitinib 15mg daily getting from [facility], approved for a year  Cont prednisone 10mg daily given HDA until re-established on upadacitinib  Continue  Bactrim-DS M-W-F pending Rituxan sensitivity   *EMG/NCV one arm and leg  as ordered 11/29/23  prior to planned muscle biopsy  Defer planned muscle biopsy until after EMG  to choose best biopsy site especially as MRI thighs normal.  Ref to [facility](Dr. Sanchez) pt and wife request for evaluation of left 5th metatarsal base Fx non-union.   [id number] karen Mediterranean diet  RTC 3 months with CBC, CMP, ESR, CRP CK aldolase LD

## 2024-05-22 NOTE — PROGRESS NOTES
ADVANCED LUNG DISEASE CLINIC FOLLOW UP                                                                                                                                          Reason for Visit:  Evaluation     Referring Physician: Jonna Shore, *    History of Present Illness: Joaquín Rod is a 53 y.o. male who is on 0L of oxygen.  He is on CPAP at nighttime only for MARTI.  His New [place] Heart Association Class is I and a Karnofsky score of 80% - Normal activity with effort: some symptoms of disease. He is not diabetic.    Patient presents today for routine follow up. 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 Patient remains on Rinvoq, plaquenil, prednisone for management of his RA. Continues to take bactrim MWF, but only on prednisone 10 mg daily. Has some dyspnea with heavy exertion which is unchanged from previous. Very compliant with his CPAP as he is unable to lay flat. Overall doing well. No recent hospitalizations/exacerbations.       Past Medical History:   Diagnosis Date    HLD (hyperlipidemia)     HTN (hypertension)     MARTI on CPAP     Rheumatoid arthritis, unspecified        Past Surgical History:   Procedure Laterality Date    REPAIR, HERNIA, INGUINAL Right 2002       Allergies: Patient has no known allergies.    Current Outpatient Medications   Medication Sig    amLODIPine (NORVASC) 10 MG tablet amlodipine 10 mg tablet   TAKE 1 TABLET BY MOUTH EVERY DAY    atorvastatin (LIPITOR) 20 MG tablet atorvastatin 20 mg tablet   TAKE 1 TABLET BY MOUTH EVERY DAY    budesonide (PULMICORT) 0.5 mg/2 mL nebulizer solution 2 mLs.    budesonide 1 mg/2 mL NbS budesonide 1 mg/2 mL suspension for nebulization   INHALE 2 ML TWICE A DAY BY NEBULIZATION ROUTE AS NEEDED.    busPIRone (BUSPAR) 10 MG tablet Take 10 mg by mouth 2 (two) times daily.    fluticasone propionate (FLONASE) 50 mcg/actuation nasal spray fluticasone propionate 50 mcg/actuation nasal spray,suspension   SPRAY 2 SPRAYS INTO EACH  NOSTRIL EVERY DAY    folic acid (FOLVITE) 1 MG tablet Take 1 tablet by mouth once daily    hydroxychloroquine (PLAQUENIL) 200 mg tablet Take 1 tablet (200 mg total) by mouth 2 (two) times daily.    metoprolol succinate (TOPROL-XL) 25 MG 24 hr tablet TAKE 1 TABLET BY MOUTH EVERY DAY FOR BLOOD PRESSURE > 140/90 OR HEART RATE > 100    pneumoc 20-melva conj-dip cr,PF, (PREVNAR-20, PF,) 0.5 mL Syrg injection Inject into the muscle.    predniSONE (DELTASONE) 5 MG tablet Take 2 tablets (10 mg total) by mouth once daily.    sulfamethoxazole-trimethoprim 400-80mg (BACTRIM,SEPTRA) 400-80 mg per tablet Take 1 tablet by mouth every Mon, Wed, Fri.    sulfaSALAzine (AZULFIDINE) 500 MG EC tablet Take 3 tablets (1,500 mg total) by mouth 2 (two) times a day.    telmisartan-hydrochlorothiazide (MICARDIS HCT) 80-25 mg per tablet telmisartan 80 mg-hydrochlorothiazide 25 mg tablet   TAKE 1 TABLET BY MOUTH EVERY DAY    upadacitinib (RINVOQ) 15 mg 24 hr tablet Take 1 tablet (15 mg total) by mouth once daily.    venlafaxine (EFFEXOR) 37.5 MG Tab Take 37.5 mg by mouth.     No current facility-administered medications for this visit.       Immunization History   Administered Date(s) Administered    COVID-19 Vaccine 03/22/2021, 04/09/2021    COVID-19, MRNA, LN-S, PF (Pfizer) (Purple Cap) 03/22/2021, 04/09/2021    COVID-19, mRNA, LNP-S, bivalent booster, PF (PFIZER OMICRON) 11/14/2022    Influenza 11/29/2006, 10/24/2008, 10/08/2017, 11/13/2018, 09/23/2019, 10/01/2023    Influenza - Quadrivalent 11/13/2018, 09/23/2019, 09/22/2020, 10/25/2021, 09/29/2022    Influenza - Quadrivalent - MDCK 11/13/2018, 09/23/2019, 09/22/2020, 10/25/2021, 09/29/2022    Influenza - Quadrivalent - PF *Preferred* (6 months and older) 11/13/2018, 09/23/2019, 09/22/2020, 10/25/2021, 09/29/2022, 09/06/2023    Influenza - Trivalent (ADULT) 11/06/2015, 12/14/2016    Influenza - Trivalent - PF (ADULT) 11/29/2006, 10/24/2008    MMR 05/24/2013    Pneumococcal Polysaccharide -  23 Valent 10/25/2021    Tdap 11/28/2017, 05/02/2022    Zoster Recombinant 06/01/2023, 07/13/2023, 08/01/2023, 09/06/2023     Family History:  No family history on file.  Social History     Substance and Sexual Activity   Alcohol Use Never      Social History     Substance and Sexual Activity   Drug Use Never      Social History     Socioeconomic History    Marital status:    Tobacco Use    Smoking status: Never     Passive exposure: Never    Smokeless tobacco: Current     Types: Chew    Tobacco comments:     Chew - sometimes - not as much as he used to   Substance and Sexual Activity    Alcohol use: Never    Drug use: Never     Review of Systems   Constitutional:  Negative for chills, diaphoresis, fever, malaise/fatigue and weight loss.   HENT:  Negative for congestion, ear discharge, ear pain, hearing loss, nosebleeds, sinus pain, sore throat and tinnitus.    Eyes:  Negative for blurred vision, double vision, photophobia, pain, discharge and redness.   Respiratory:  Positive for shortness of breath. Negative for cough, hemoptysis, sputum production, wheezing and stridor.    Cardiovascular:  Positive for leg swelling. Negative for chest pain, palpitations, orthopnea, claudication and PND.   Gastrointestinal:  Negative for abdominal pain, blood in stool, constipation, diarrhea, heartburn, melena, nausea and vomiting.   Genitourinary:  Negative for dysuria, flank pain, frequency, hematuria and urgency.   Musculoskeletal:  Positive for joint pain and myalgias. Negative for back pain, falls and neck pain.   Skin:  Negative for itching and rash.   Neurological: Negative.  Negative for dizziness, tingling, tremors, sensory change, speech change, focal weakness, seizures, loss of consciousness, weakness and headaches.   Endo/Heme/Allergies:  Negative for environmental allergies and polydipsia. Does not bruise/bleed easily.   Psychiatric/Behavioral: Negative.  Negative for depression, hallucinations, memory loss,  "substance abuse and suicidal ideas. The patient is not nervous/anxious and does not have insomnia.      Vitals  /69 (BP Location: Right arm, Patient Position: Sitting, BP Method: Large (Automatic))   Pulse 68   Temp 98.5 °F (36.9 °C) (Oral)   Resp 20   Ht 5' 6" (1.676 m)   Wt 117 kg (257 lb 15 oz)   SpO2 97%   BMI 41.63 kg/m²   Physical Exam  Vitals and nursing note reviewed.   Constitutional:       General: He is not in acute distress.     Appearance: Normal appearance. He is normal weight. He is not ill-appearing.   HENT:      Head: Normocephalic and atraumatic.      Nose: Nose normal. No congestion or rhinorrhea.   Eyes:      General: No scleral icterus.     Extraocular Movements: Extraocular movements intact.      Conjunctiva/sclera: Conjunctivae normal.   Cardiovascular:      Rate and Rhythm: Normal rate and regular rhythm.      Heart sounds: Normal heart sounds. No murmur heard.     No friction rub. No gallop.   Pulmonary:      Effort: Pulmonary effort is normal. No respiratory distress.      Breath sounds: Normal breath sounds. No stridor. No wheezing, rhonchi or rales.   Abdominal:      General: Abdomen is flat. Bowel sounds are normal. There is no distension.      Palpations: Abdomen is soft.      Tenderness: There is no abdominal tenderness.   Musculoskeletal:         General: Swelling (2+ pitting edema BLE bilaterally) present. Normal range of motion.      Cervical back: Normal range of motion and neck supple.      Right lower leg: No edema.      Left lower leg: No edema.   Skin:     General: Skin is warm and dry.   Neurological:      General: No focal deficit present.      Mental Status: He is alert and oriented to person, place, and time.      Cranial Nerves: No cranial nerve deficit.   Psychiatric:         Mood and Affect: Mood normal.         Behavior: Behavior normal.         Judgment: Judgment normal.         Labs:  No visits with results within 7 Day(s) from this visit.   Latest known " visit with results is:   Lab Visit on 02/14/2024   Component Date Value    Cholesterol 02/14/2024 185     Triglycerides 02/14/2024 107     HDL 02/14/2024 60     LDL Cholesterol 02/14/2024 103.6     HDL/Cholesterol Ratio 02/14/2024 32.4     Total Cholesterol/HDL Ra* 02/14/2024 3.1     Non-HDL Cholesterol 02/14/2024 125     Rituxan Sensitivity (CD2* 02/14/2024 Performed     CD20 Cell Expression Fin* 02/14/2024 SEE BELOW            5/22/2024     2:32 PM 11/29/2023     1:46 PM 6/12/2023     1:20 PM 2/2/2023     1:07 PM   Pulmonary Function Tests   FVC 3.57 liters 3.75 liters 3.84 liters 3.19 liters   FEV1 3.1 liters 3.22 liters 3.37 liters 2.73 liters   TLC (liters) 4.48 liters 4.91 liters 4.92 liters 4.5 liters   DLCO (ml/mmHg sec) 22 ml/mmHg sec 22.3 ml/mmHg sec 25.12 ml/mmHg sec 20.54 ml/mmHg sec   FVC% 85.1 88.9 90.9 75.4   FEV1% 93.5 96.7 100.6 81.3   FEF 25-75 4.7 4.64 5 3.66   FEF 25-75% 156.6 153.1 163.9 119.2   TLC% 70.9 77.8 78 71.3   RV 0.9 1.16 1.08 0.9   RV% 42 54.1 50.7 42.2   DLCO% 81.6 82.7 92.5 75.6         5/22/2024     2:29 PM 2/2/2023    12:22 PM 8/17/2022    12:13 PM   6MW   6MWT Status completed without stopping completed without stopping completed without stopping   Patient Reported Dyspnea;Leg pain Dyspnea;Leg pain Dyspnea   Was O2 used? No No No   6MW Distance walked (feet) 650 feet 732 feet 800 feet   Distance walked (meters) 198.12 meters 223.11 meters 243.84 meters   Did patient stop? No No No   Oxygen Saturation 92 % 97 % 96 %   Supplemental Oxygen Room Air Room Air Room Air   Heart Rate 69 bpm 100 bpm 94 bpm   Blood Pressure 155/94 129/74 122/70   Hu Dyspnea Rating  very light very light moderate   Oxygen Saturation 98 % 97 % 96 %   Supplemental Oxygen Room Air Room Air Room Air   Heart Rate 99 bpm 131 bpm 116 bpm   Blood Pressure 134/77 130/77 119/72   Hu Dyspnea Rating  heavy heavy heavy   Recovery Time (seconds) 103 seconds 142 seconds 72 seconds   Oxygen Saturation 98 % 98 % 96 %    Supplemental Oxygen Room Air Room Air Room Air   Heart Rate 76 bpm 110 bpm 104 bpm       Imaging:  Results for orders placed during the hospital encounter of 03/12/21    X-Ray Chest PA And Lateral    Narrative  EXAMINATION:  XR CHEST PA AND LATERAL    CLINICAL HISTORY:  Rheumatoid arthritis, unspecified    TECHNIQUE:  PA and lateral views of the chest were performed.    COMPARISON:  None    FINDINGS:  Cardiac size is normal.  Lungs are clear and no infiltrate is seen.    Impression  See above      Electronically signed by: Joaquín Caldera MD  Date:    03/12/2021  Time:    11:18    CT Chest Without Contrast 10/07/2022    Narrative  EXAMINATION:  CT CHEST WITHOUT CONTRAST    CLINICAL HISTORY:  Interstitial lung disease;ild; Interstitial pulmonary disease, unspecified    TECHNIQUE:  Low dose axial images, sagittal and coronal reformations were obtained from the thoracic inlet to the lung bases. Contrast was not administered.  Inspiratory, expiratory, and prone images were obtained.    COMPARISON:  CT chest 04/14/2021    FINDINGS:  Lungs/Pleura: Clear lungs.  No focal opacity or mass.  No pleural effusion or thickening.  Expiratory views demonstrate no evidence of air trapping.  The prone views are noncontributory.    Base of Neck: Unremarkable.    Thoracic soft tissues: Within normal limits.    Esophagus: Normal.    Airways: Patent.    Aorta: Left-sided aortic arch.  No aneurysm and no significant atherosclerosis.    Heart: Normal size. No effusion.    Pulmonary vasculature: Unremarkable.    Mercedez/Mediastinum: No pathologic amanda enlargement.    Upper Abdomen: There is a 6 cm hypodense hepatic lesion with adjacent smaller hypodensities, similar to prior, likely hepatic cysts.    Bones: Within normal limits for age.    Impression  1.  No CT evidence for interstitial pneumonia.  2. circumscribed hypodense lesion within the liver, stable in consistent with hepatic cysts versus hemangiomas.    Electronically signed by  resident: Ingrid Hernandez  Date:    10/07/2022  Time:    13:50    Electronically signed by: Elsi Segal  Date:    10/07/2022  Time:    17:15     Cardiodiagnostics:  Results for orders placed during the hospital encounter of 09/28/22    Echo    Interpretation Summary  · The left ventricle is normal in size with normal systolic function.  · The estimated ejection fraction is 65%.  · Normal left ventricular diastolic function.  · Normal right ventricular size with normal right ventricular systolic function.      Results for orders placed during the hospital encounter of 06/12/23    Echo    Interpretation Summary  · The left ventricle is normal in size with normal systolic function. The estimated ejection fraction is 60%.  · Normal right ventricular size with normal right ventricular systolic function.  · Normal left ventricular diastolic function.  · The estimated PA systolic pressure is 22 mmHg.  · Normal central venous pressure (3 mmHg).    Assessment:  1. Rheumatoid arthritis, involving unspecified site, unspecified whether rheumatoid factor present        Plan:     CT chest without evidence of ILD. FVC with slight downtrend, but likely due to weight gain over the last year. DLCO stable. Continue current management for RA with MTX, plaquenil, prednisone and Rinvoq.     RTC in 6 months or sooner if needed. Repeat PFTs and 6MWT at that time. Repeat TTE prior to next visit.       Jessi Dias   05/22/2024

## 2024-05-22 NOTE — PROGRESS NOTES
"Subjective:      Patient ID: Joaquín Rod is a 53 y.o. male.    Chief Complaint: No chief complaint on file.    53 year old man with PMH Seronegative RA with positive Anti CarP, HTN, HLD, SOB presents for follow up. Patient previously managed on Rituxan (last in march of 23'), as well as rinvoq, plaquenil, SSZ, Prednisone 10 mg daily. Patient was started on Rituxan and received this medication 2/24 and 3/10/23. He reported significant initial relief, Unfortunately he continued with joint pain, swelling, and stiffness throughout his body leading to difficulties cleaning, bathing, dressing himself etc at last appointment. Rinvoq was started at that time, with significant improvement initially. Unfortunately he has been out of rinvoq x ~ 6 weeks due to insurance issues. Only back on is < 1 week. With active joint disease at this time.     Review of Systems   Constitutional:  Negative for activity change, appetite change, chills, fatigue and fever.   Respiratory:  Positive for shortness of breath (inproved). Negative for apnea, cough, choking, chest tightness, wheezing and stridor.    Cardiovascular:  Negative for chest pain and palpitations.   Gastrointestinal:  Negative for abdominal distention, abdominal pain, constipation, diarrhea and vomiting.   Musculoskeletal:  Positive for arthralgias, gait problem and joint swelling. Negative for back pain, neck pain and neck stiffness.   Skin:  Negative for color change, pallor, rash and wound.   Neurological:  Negative for seizures, syncope, facial asymmetry, light-headedness and headaches.      Objective:   BP (!) 151/91 (BP Location: Left arm, Patient Position: Sitting, BP Method: Medium (Automatic))   Pulse 69   Ht 5' 8" (1.727 m)   Wt 117 kg (257 lb 15 oz)   BMI 39.22 kg/m²   Physical Exam   Constitutional: normal appearance. He appears obese. No distress. He does not appear ill.   HENT:   Head: Normocephalic and atraumatic.   Nose: Nose normal. No rhinorrhea " or nasal congestion.   Mouth/Throat: Mucous membranes are moist. No oropharyngeal exudate or posterior oropharyngeal erythema. Oropharynx is clear.   Eyes: Pupils are equal, round, and reactive to light. Conjunctivae are normal. Right eye exhibits no discharge. Left eye exhibits no discharge. No scleral icterus.   Neck: Carotid bruit is not present.   Cardiovascular: Normal rate, regular rhythm, normal heart sounds and normal pulses. Exam reveals no gallop and no friction rub.   No murmur heard.  Pulmonary/Chest: Effort normal and breath sounds normal. No stridor. No respiratory distress. He has no wheezes. He has no rhonchi. He has no rales. He exhibits no tenderness.   Abdominal: Bowel sounds are normal. He exhibits no distension and no mass. There is no abdominal tenderness. There is no rebound and no guarding. No hernia.   Musculoskeletal:         General: Swelling and tenderness present. No deformity or signs of injury. Normal range of motion.      Cervical back: Normal range of motion and neck supple. No rigidity or tenderness.      Right lower leg: No edema.      Left lower leg: No edema.   Lymphadenopathy:     He has no cervical adenopathy.   Neurological: He is alert.   Skin: Skin is warm and dry.   Vitals reviewed.           Assessment:     1. Interstitial pulmonary disease, unspecified    2. Rheumatoid arthritis, involving unspecified site, unspecified whether rheumatoid factor present      Seronegative RF: Patient with positive CarP Ab. He has failed TNF, Orencia, rituxan, and Actemra. MTX/Arava held secondary to liver concerns. On Rinvoq, SSZ, HCQ, prednisone 10 mg and improving/stable when he was able to take it. Suspect it will start working again after he has been on it for several weeks.   -Continue HCQ and Rinvoq at this time, patient with ~ 60% improvement on this medication previously. Patient advised to get eyes checked yearly while on HCQ.   -Continue SSZ 1500 mg bid   -Do not plan further use  of rituximab at this time. Will check Rituxan sensitivity labs and likely DC bactrim if B cell return has taken place.   -Dexa with low risk osteopenia, do not plan to add alendronate at this time.   -Labs q 12 weeks  -Check MRI hand to assess for dz activity prior to next visit   -Check MRI hip to evaluate for AVN     SOB: Follows with pulmonology. PFTs noted  -Continue to follow with advanced lung     LE weakness: MRI femur negative  -Check EMG     3 mo follow up    Plan:     Problem List Items Addressed This Visit          Pulmonary    Interstitial pulmonary disease, unspecified - Primary    Relevant Orders    Hydroxychloroquine (Plaquenil), blood    Rituxan Sensitivity       Immunology/Multi System    Rheumatoid arthritis    Relevant Orders    Hydroxychloroquine (Plaquenil), blood    Rituxan Sensitivity     Roberto Carlos Tamez

## 2024-05-24 ENCOUNTER — LAB VISIT (OUTPATIENT)
Dept: LAB | Facility: HOSPITAL | Age: 54
End: 2024-05-24
Attending: NURSE PRACTITIONER

## 2024-05-24 ENCOUNTER — OFFICE VISIT (OUTPATIENT)
Dept: HEPATOLOGY | Facility: CLINIC | Age: 54
End: 2024-05-24

## 2024-05-24 VITALS — HEIGHT: 68 IN | WEIGHT: 256.63 LBS | BODY MASS INDEX: 38.9 KG/M2

## 2024-05-24 DIAGNOSIS — M05.712 RHEUMATOID ARTHRITIS INVOLVING BOTH SHOULDERS WITH POSITIVE RHEUMATOID FACTOR: ICD-10-CM

## 2024-05-24 DIAGNOSIS — I10 ESSENTIAL HYPERTENSION: ICD-10-CM

## 2024-05-24 DIAGNOSIS — K74.00 LIVER FIBROSIS: ICD-10-CM

## 2024-05-24 DIAGNOSIS — K76.0 METABOLIC DYSFUNCTION-ASSOCIATED STEATOTIC LIVER DISEASE (MASLD): ICD-10-CM

## 2024-05-24 DIAGNOSIS — M05.711 RHEUMATOID ARTHRITIS INVOLVING BOTH SHOULDERS WITH POSITIVE RHEUMATOID FACTOR: ICD-10-CM

## 2024-05-24 DIAGNOSIS — K76.0 METABOLIC DYSFUNCTION-ASSOCIATED STEATOTIC LIVER DISEASE (MASLD): Primary | ICD-10-CM

## 2024-05-24 DIAGNOSIS — Z92.21 HISTORY OF METHOTREXATE THERAPY: ICD-10-CM

## 2024-05-24 DIAGNOSIS — E66.9 OBESITY (BMI 30-39.9): ICD-10-CM

## 2024-05-24 DIAGNOSIS — E80.6 BILIRUBINEMIA: ICD-10-CM

## 2024-05-24 DIAGNOSIS — E78.5 DYSLIPIDEMIA: ICD-10-CM

## 2024-05-24 PROBLEM — M79.642 PAIN OF LEFT HAND: Status: ACTIVE | Noted: 2023-10-15

## 2024-05-24 PROBLEM — U07.1 COVID-19: Status: ACTIVE | Noted: 2022-01-21

## 2024-05-24 PROBLEM — H91.90 ACQUIRED HEARING LOSS: Status: ACTIVE | Noted: 2024-01-09

## 2024-05-24 PROBLEM — S92.355D NONDISPLACED FRACTURE OF FIFTH LEFT METATARSAL BONE WITH ROUTINE HEALING: Status: ACTIVE | Noted: 2023-08-02

## 2024-05-24 PROBLEM — M79.672 LEFT FOOT PAIN: Status: ACTIVE | Noted: 2023-12-08

## 2024-05-24 PROBLEM — R07.9 CHEST PAIN: Status: ACTIVE | Noted: 2024-05-24

## 2024-05-24 PROBLEM — S92.352K: Status: ACTIVE | Noted: 2023-07-18

## 2024-05-24 PROBLEM — J01.90 ACUTE SINUSITIS: Status: ACTIVE | Noted: 2023-08-27

## 2024-05-24 PROBLEM — R50.9 FEVER: Status: ACTIVE | Noted: 2024-05-24

## 2024-05-24 PROBLEM — G25.81 RESTLESS LEGS: Status: ACTIVE | Noted: 2024-05-24

## 2024-05-24 LAB
ESTIMATED AVG GLUCOSE: 111 MG/DL (ref 68–131)
HBA1C MFR BLD: 5.5 % (ref 4.5–6.2)

## 2024-05-24 PROCEDURE — 99214 OFFICE O/P EST MOD 30 MIN: CPT | Mod: S$PBB,NTX,, | Performed by: NURSE PRACTITIONER

## 2024-05-24 PROCEDURE — 83036 HEMOGLOBIN GLYCOSYLATED A1C: CPT | Performed by: NURSE PRACTITIONER

## 2024-05-24 PROCEDURE — 99213 OFFICE O/P EST LOW 20 MIN: CPT | Mod: PBBFAC,PN,TXP | Performed by: NURSE PRACTITIONER

## 2024-05-24 PROCEDURE — 99999 PR PBB SHADOW E&M-EST. PATIENT-LVL III: CPT | Mod: PBBFAC,TXP,, | Performed by: NURSE PRACTITIONER

## 2024-05-24 PROCEDURE — 36415 COLL VENOUS BLD VENIPUNCTURE: CPT | Performed by: NURSE PRACTITIONER

## 2024-05-24 NOTE — PROGRESS NOTES
[facility] Established Patient Visit    Reason for Visit: Elevated LFT's    PCP: Rosaline Gamboa    HPI:  This is a 53 y.o. male with PMH noted below, here for follow up for elevated LFT's, referred by Rheumatology. He was last seen in clinic by myself in 2/2023.    The patient's risk factors for NAFLD/CAZARES include:     Obesity                                        Yes; BMI 39.02 - Net weight loss of 8 lbs since last visit.  Dyslipidemia                                Yes; Well controlled on Atorvastatin 20 mg daily.   Latest Reference Range & Units 05/22/24 14:56   Cholesterol Total 120 - 199 mg/dL 157   HDL 40 - 75 mg/dL 47   HDL/Cholesterol Ratio 20.0 - 50.0 % 29.9   Non-HDL Cholesterol mg/dL 110   Total Cholesterol/HDL Ratio 2.0 - 5.0  3.3   Triglycerides 30 - 150 mg/dL 114   LDL Cholesterol 63.0 - 159.0 mg/dL 87.2     Insulin resistance/Diabetes         No; but history of intermittently elevated BS levels on chemistry panels.    He has a history of RA, previously treated with MTX for 2 years. He is currently on Rinvoq, Plaquenil, Sulfasalazine and Prednisone 10 mg daily, per Dr. Mcgovern. He is no longer on Rituxan infusions. He has had elevated LFT's since at least 2/2021. LFT's at initial visit in 2/2023 showed a T. Bili of 2.0 , Albumin of 4.3, ALT of 61, AST of 36, ALP of 70. T. Bili has not been fractionated, but is likely elevated secondary to Gilbert Syndrome. Refer to recent LFT trend, as below:     Latest Reference Range & Units 05/03/23 13:10 06/12/23 10:27 09/06/23 12:33 11/29/23 12:57 05/22/24 14:56   ALP 55 - 135 U/L  55 - 135 U/L 62 72 62 59 60  60   PROTEIN TOTAL 6.0 - 8.4 g/dL  6.0 - 8.4 g/dL 7.3 7.3 7.7 7.4 7.0  7.0   Albumin 3.5 - 5.2 g/dL  3.5 - 5.2 g/dL 4.3 4.1 4.5 4.5 4.3  4.3   BILIRUBIN TOTAL 0.1 - 1.0 mg/dL  0.1 - 1.0 mg/dL 1.1 (H) 1.3 (H) 1.4 (H) 1.0 1.1 (H)  1.1 (H)   AST 10 - 40 U/L  10 - 40 U/L 23 25 28 30 26  26   ALT 10 - 44 U/L  10 - 44 U/L 23 27 31 21 22  22      Prior abdominal last US in 5/2023 showed:    FINDINGS:    Pancreas: The visualized portions of pancreas appear normal.     Aorta: No aneurysm.     Liver: 14.8 cm, normal in size. Diffusely increased parenchymal echogenicity consistent with steatosis. HRI measures 2.36.  Simple cyst within the left lobe measuring 5.9 x 6.2 x 6.4 cm.     Gallbladder: No calculi, wall thickening, or pericholecystic fluid.  Negative sonographic Cheney's sign.     Biliary system: 3 mm common bile duct.  No intrahepatic ductal dilatation.     Inferior vena cava: Normal in appearance.     Right kidney: 11.8 cm. No hydronephrosis.     Left kidney: 11.5 cm. No hydronephrosis.     Spleen: 9.2 x 3.5 cm.  Normal in size with homogeneous echotexture.     Miscellaneous: No ascites.     Impression:     1. Hepatic steatosis.  2. Left hepatic lobe simple cyst.    He has no known family history of liver disease. He denies any history of heavy alcohol use. He does not use illicit drugs. Viral hepatitis testing was negative. He is not immune to Hepatitis A or B. Fibroscan to non-invasively stage his liver disease in 2/2023 was suggestive of severe fatty infiltration of the liver (S3), with F2-F3 (moderate to advanced) fibrosis and a low likelihood of cirrhosis. Repeat Fibroscan earlier this year was improved and was still suggestive of severe fatty infiltration of the liver (S3, but with improved CAP score), with F0-F1 fibrosis (minimal to no fibrosis).    FIB-4 Calculation: 0 at 5/24/2024  9:02 AM   Calculated from:  Last SGOT/AST : 26 at 5/24/2024  9:02 AM  Last SGPT/ALT: 22 at 5/24/2024  9:02 AM   Platelets: 177 at 5/24/2024  9:02 AM   Age: 53 y.o.     FIB-4 below 1.30 is considered as low-risk for advanced fibrosis  FIB-4 over 2.67 is considered as high-risk for advanced fibrosis  FIB-4 values between 1.30 and 2.67 are considered as intermediate-risk of advanced fibrosis for ages 36-64.     For ages > 64 the cut-off for low-risk goes to <  2.  This is a screening tool and clinical judgement should be used in the interpretation of these results.    He is well appearing, and has no signs or symptoms of hepatic decompensation including jaundice, dark urine, pruritus, abdominal distention, lower extremity edema, hematemesis, melena, or periods of confusion suggestive of hepatic encephalopathy.      PMHX:  has a past medical history of HLD (hyperlipidemia), HTN (hypertension), MARTI on CPAP, and Rheumatoid arthritis, unspecified.    PSHX:  has a past surgical history that includes repair, hernia, inguinal (Right, 2002).    The patient's social and family histories were reviewed by me and updated in the appropriate section of the electronic medical record.    Review of patient's allergies indicates:  No Known Allergies    Current Outpatient Medications on File Prior to Visit   Medication Sig Dispense Refill    amLODIPine (NORVASC) 10 MG tablet amlodipine 10 mg tablet   TAKE 1 TABLET BY MOUTH EVERY DAY      atorvastatin (LIPITOR) 20 MG tablet atorvastatin 20 mg tablet   TAKE 1 TABLET BY MOUTH EVERY DAY      budesonide 1 mg/2 mL NbS budesonide 1 mg/2 mL suspension for nebulization   INHALE 2 ML TWICE A DAY BY NEBULIZATION ROUTE AS NEEDED.      busPIRone (BUSPAR) 10 MG tablet Take 10 mg by mouth 2 (two) times daily.      fluticasone propionate (FLONASE) 50 mcg/actuation nasal spray fluticasone propionate 50 mcg/actuation nasal spray,suspension   SPRAY 2 SPRAYS INTO EACH NOSTRIL EVERY DAY      folic acid (FOLVITE) 1 MG tablet Take 1 tablet by mouth once daily 180 tablet 5    hydroxychloroquine (PLAQUENIL) 200 mg tablet Take 1 tablet (200 mg total) by mouth 2 (two) times daily. 180 tablet 1    metoprolol succinate (TOPROL-XL) 25 MG 24 hr tablet TAKE 1 TABLET BY MOUTH EVERY DAY FOR BLOOD PRESSURE > 140/90 OR HEART RATE > 100      predniSONE (DELTASONE) 5 MG tablet Take 2 tablets (10 mg total) by mouth once daily. 180 tablet 1    sulfamethoxazole-trimethoprim  400-80mg (BACTRIM,SEPTRA) 400-80 mg per tablet Take 1 tablet by mouth every Mon, Wed, Fri. 540 tablet 0    sulfaSALAzine (AZULFIDINE) 500 MG EC tablet Take 3 tablets (1,500 mg total) by mouth 2 (two) times a day. 540 tablet 1    telmisartan-hydrochlorothiazide (MICARDIS HCT) 80-25 mg per tablet telmisartan 80 mg-hydrochlorothiazide 25 mg tablet   TAKE 1 TABLET BY MOUTH EVERY DAY      upadacitinib (RINVOQ) 15 mg 24 hr tablet Take 1 tablet (15 mg total) by mouth once daily. 15 tablet 1    venlafaxine (EFFEXOR) 37.5 MG Tab Take 37.5 mg by mouth once daily.      [DISCONTINUED] budesonide (PULMICORT) 0.5 mg/2 mL nebulizer solution 2 mLs. (Patient not taking: Reported on 5/24/2024)      [DISCONTINUED] pneumoc 20-melva conj-dip cr,PF, (PREVNAR-20, PF,) 0.5 mL Syrg injection Inject into the muscle. (Patient not taking: Reported on 5/24/2024) 0.5 mL 0     No current facility-administered medications on file prior to visit.       SOCIAL HISTORY:   Social History     Tobacco Use   Smoking Status Never    Passive exposure: Never   Smokeless Tobacco Current    Types: Chew   Tobacco Comments    Chew - sometimes - not as much as he used to     Social History     Substance and Sexual Activity   Alcohol Use Never     Social History     Substance and Sexual Activity   Drug Use Never     ROS:   GENERAL: Denies fever, chills, weight loss/gain, fatigue  HEENT: Denies headaches, dizziness, vision/hearing changes  CARDIOVASCULAR: Denies chest pain, palpitations, or edema  RESPIRATORY: Denies dyspnea, cough  GI: Denies abdominal pain, rectal bleeding, nausea, vomiting. No change in bowel pattern or color  : Denies dysuria, hematuria   SKIN: Denies rash, itching   NEURO: Denies confusion, memory loss, or mood changes  PSYCH: Denies depression or anxiety  HEME/LYMPH: Denies easy bruising or bleeding    Objective Findings:    PHYSICAL EXAM:   Friendly White male, in no acute distress; alert and oriented to person, place and time.  VITALS: Ht  "5' 8" (1.727 m)   Wt 116.4 kg (256 lb 9.9 oz)   BMI 39.02 kg/m²   HENT: Normocephalic, without obvious abnormality.   EYES: Sclerae anicteric.   NECK: No obvious masses.  CARDIOVASCULAR: No peripheral edema.  RESPIRATORY: Normal respiratory effort.   GI: Soft, non-tender, obese abdomen.  EXTREMITIES:  No clubbing, cyanosis or edema.  SKIN: Warm and dry. No jaundice. No rashes noted to exposed skin.  NEURO: No asterixis. Ambulates with cane.  PSYCH:  Memory intact. Thought and speech pattern appropriate. Behavior normal. No depression or anxiety noted.    DIAGNOSTIC STUDIES:    FIBROSCAN 2/15/2023:    Findings  Median liver stiffness score:  10.1  CAP Reading: dB/m:  380     IQR/med %:  13  Interpretation  Fibrosis interpretation is based on medial liver stiffness - Kilopascal (kPa).     Fibrosis Stage:  F3  Steatosis interpretation is based on controlled attenuation parameter - (dB/m).     Steatosis Grade:  S3  Comments/Plan:  F2-F3 fibrosis    FIBROSCAN 2/14/2024:    Findings  Median liver stiffness score:  5.8  CAP Reading: dB/m:  305     IQR/med %:  12  Interpretation  Fibrosis interpretation is based on medial liver stiffness - Kilopascal (kPa).     Fibrosis Stage:  F 0-1  Steatosis interpretation is based on controlled attenuation parameter - (dB/m).     Steatosis Grade:  S3    EDUCATION:  Per AVS.    ASSESSMENT & PLAN:  53 y.o. White male with:    1. Metabolic dysfunction-associated steatotic liver disease (MASLD)  FibroScan Transplant Hepatology(Vibration Controlled Transient Elastography)    Hemoglobin A1C      2. Liver fibrosis  FibroScan Transplant Hepatology(Vibration Controlled Transient Elastography)    Hemoglobin A1C      3. Bilirubinemia  FibroScan Transplant Hepatology(Vibration Controlled Transient Elastography)    Hemoglobin A1C      4. Obesity (BMI 30-39.9)  FibroScan Transplant Hepatology(Vibration Controlled Transient Elastography)    Hemoglobin A1C      5. Dyslipidemia  FibroScan Transplant " Hepatology(Vibration Controlled Transient Elastography)    Hemoglobin A1C      6. Essential hypertension  FibroScan Transplant Hepatology(Vibration Controlled Transient Elastography)    Hemoglobin A1C      7. Rheumatoid arthritis involving both shoulders with positive rheumatoid factor  FibroScan Transplant Hepatology(Vibration Controlled Transient Elastography)    Hemoglobin A1C      8. History of methotrexate therapy  FibroScan Transplant Hepatology(Vibration Controlled Transient Elastography)    Hemoglobin A1C        - Obtain HgbA1c today. If elevated will contact PCP to discuss medical weight loss options.  - Recommend Fibroscan to non-invasively restage liver disease every 2 years.   - Recommend an Ultrasound of the liver every 2 years.  - Repeat liver function tests every 6-12 months.  - Avoid alcohol and herbal supplements/alternative remedies.  - Recommend additional weight loss of 25 lbs, through diet and exercise.  - Recommend good control of cholesterol, blood pressure, & blood sugar levels  - Return to clinic to be determined by lab results.    Thank you for allowing me to participate in the care of Joaquín Rod       Hepatology Nurse Practitioner  [facility]    CC'ed note to:   No ref. provider found  Rosaline Gamboa MD

## 2024-05-25 ENCOUNTER — PATIENT MESSAGE (OUTPATIENT)
Dept: TRANSPLANT | Facility: CLINIC | Age: 54
End: 2024-05-25

## 2024-05-30 DIAGNOSIS — R06.02 SHORTNESS OF BREATH: ICD-10-CM

## 2024-05-30 DIAGNOSIS — M06.9 RHEUMATOID ARTHRITIS, INVOLVING UNSPECIFIED SITE, UNSPECIFIED WHETHER RHEUMATOID FACTOR PRESENT: Primary | ICD-10-CM

## 2024-05-30 DIAGNOSIS — J84.9 INTERSTITIAL PULMONARY DISEASE, UNSPECIFIED: ICD-10-CM

## 2024-08-26 PROBLEM — J01.90 ACUTE SINUSITIS: Status: RESOLVED | Noted: 2023-08-27 | Resolved: 2024-08-26

## 2024-10-16 ENCOUNTER — HOSPITAL ENCOUNTER (OUTPATIENT)
Dept: RADIOLOGY | Facility: HOSPITAL | Age: 54
Discharge: HOME | End: 2024-10-16
Attending: STUDENT IN AN ORGANIZED HEALTH CARE EDUCATION/TRAINING PROGRAM
Payer: MEDICARE

## 2024-10-16 ENCOUNTER — OFFICE VISIT (OUTPATIENT)
Dept: RHEUMATOLOGY | Facility: CLINIC | Age: 54
End: 2024-10-16
Payer: MEDICARE

## 2024-10-16 VITALS
WEIGHT: 244 LBS | HEIGHT: 68 IN | SYSTOLIC BLOOD PRESSURE: 128 MMHG | DIASTOLIC BLOOD PRESSURE: 84 MMHG | BODY MASS INDEX: 36.98 KG/M2 | HEART RATE: 88 BPM | OXYGEN SATURATION: 97 %

## 2024-10-16 DIAGNOSIS — Z79.60 ENCOUNTER FOR MONITORING IMMUNOSUPPRESSIVE MEDICATION THERAPY CAUSING IMMUNODEFICIENCY: ICD-10-CM

## 2024-10-16 DIAGNOSIS — M06.012 RHEUMATOID ARTHRITIS INVOLVING BOTH SHOULDERS WITH NEGATIVE RHEUMATOID FACTOR: Primary | ICD-10-CM

## 2024-10-16 DIAGNOSIS — M25.511 CHRONIC RIGHT SHOULDER PAIN: ICD-10-CM

## 2024-10-16 DIAGNOSIS — M62.81 MUSCLE WEAKNESS: ICD-10-CM

## 2024-10-16 DIAGNOSIS — M79.672 LEFT FOOT PAIN: ICD-10-CM

## 2024-10-16 DIAGNOSIS — Z51.81 ENCOUNTER FOR MONITORING IMMUNOSUPPRESSIVE MEDICATION THERAPY CAUSING IMMUNODEFICIENCY: ICD-10-CM

## 2024-10-16 DIAGNOSIS — D84.821 ENCOUNTER FOR MONITORING IMMUNOSUPPRESSIVE MEDICATION THERAPY CAUSING IMMUNODEFICIENCY: ICD-10-CM

## 2024-10-16 DIAGNOSIS — G89.29 CHRONIC RIGHT SHOULDER PAIN: ICD-10-CM

## 2024-10-16 DIAGNOSIS — M06.011 RHEUMATOID ARTHRITIS INVOLVING BOTH SHOULDERS WITH NEGATIVE RHEUMATOID FACTOR: Primary | ICD-10-CM

## 2024-10-16 PROCEDURE — 3074F SYST BP LT 130 MM HG: CPT | Mod: CPTII,GC,NTX,S$GLB | Performed by: STUDENT IN AN ORGANIZED HEALTH CARE EDUCATION/TRAINING PROGRAM

## 2024-10-16 PROCEDURE — 1159F MED LIST DOCD IN RCRD: CPT | Mod: CPTII,GC,NTX,S$GLB | Performed by: STUDENT IN AN ORGANIZED HEALTH CARE EDUCATION/TRAINING PROGRAM

## 2024-10-16 PROCEDURE — 3008F BODY MASS INDEX DOCD: CPT | Mod: CPTII,GC,NTX,S$GLB | Performed by: STUDENT IN AN ORGANIZED HEALTH CARE EDUCATION/TRAINING PROGRAM

## 2024-10-16 PROCEDURE — 3079F DIAST BP 80-89 MM HG: CPT | Mod: CPTII,GC,NTX,S$GLB | Performed by: STUDENT IN AN ORGANIZED HEALTH CARE EDUCATION/TRAINING PROGRAM

## 2024-10-16 PROCEDURE — 99999 PR PBB SHADOW E&M-EST. PATIENT-LVL IV: CPT | Mod: PBBFAC,GC,TXP, | Performed by: STUDENT IN AN ORGANIZED HEALTH CARE EDUCATION/TRAINING PROGRAM

## 2024-10-16 PROCEDURE — 73630 X-RAY EXAM OF FOOT: CPT | Mod: 26,LT,TXP, | Performed by: RADIOLOGY

## 2024-10-16 PROCEDURE — 3044F HG A1C LEVEL LT 7.0%: CPT | Mod: CPTII,GC,NTX,S$GLB | Performed by: STUDENT IN AN ORGANIZED HEALTH CARE EDUCATION/TRAINING PROGRAM

## 2024-10-16 PROCEDURE — 99213 OFFICE O/P EST LOW 20 MIN: CPT | Mod: GC,NTX,S$GLB, | Performed by: STUDENT IN AN ORGANIZED HEALTH CARE EDUCATION/TRAINING PROGRAM

## 2024-10-16 PROCEDURE — 73630 X-RAY EXAM OF FOOT: CPT | Mod: TC,LT,TXP

## 2024-10-16 RX ORDER — INFLUENZA VACCINE, ADJUVANTED 15; 15; 15; 15 UG/.5ML; UG/.5ML; UG/.5ML; UG/.5ML
60 INJECTION, SUSPENSION INTRAMUSCULAR ONCE
Qty: 0.5 ML | Refills: 0 | Status: SHIPPED | OUTPATIENT
Start: 2024-10-16 | End: 2024-10-16

## 2024-10-16 NOTE — PROGRESS NOTES
I have personally reviewed the history, confirmed exam findings, and discussed assessment and plan with fellow.    Less pain and swelling in joints, feels best so far on Rinvoq   is stronger  Less SOB  No definite change in weakness he feels  Reinjured left foot by stepping on bar of soap in tub                         R           L  Deltoids     4.8           4.8 improved from 4  Biceps        4.8(improved from 4.6)       4.8 stable  Triceps       4.8        4.8 stable              4.6        4.6 (both improved from 4.4)  Psoas         4.4         4.2 (both improved from 4)  Quad          4.6       4.6 (both improved from 4.4)      Chest is clear to auscultation  + oral thrush       Latest Reference Range & Units 10/16/24 10:55   WBC 3.90 - 12.70 K/uL 6.77   RBC 4.60 - 6.20 M/uL 4.97   Hemoglobin 14.0 - 18.0 g/dL 15.5   Hematocrit 40.0 - 54.0 % 46.3   MCV 82 - 98 fL 93   MCH 27.0 - 31.0 pg 31.2 (H)   MCHC 32.0 - 36.0 g/dL 33.5   RDW 11.5 - 14.5 % 13.4   Platelet Count 150 - 450 K/uL 187   MPV 9.2 - 12.9 fL 11.6   Gran % 38.0 - 73.0 % 67.5   Lymph % 18.0 - 48.0 % 17.9 (L)   Mono % 4.0 - 15.0 % 13.1   Eos % 0.0 - 8.0 % 0.6   Basophil % 0.0 - 1.9 % 0.6   Immature Granulocytes 0.0 - 0.5 % 0.3   Gran # (ANC) 1.8 - 7.7 K/uL 4.6   Lymph # 1.0 - 4.8 K/uL 1.2   Mono # 0.3 - 1.0 K/uL 0.9   Eos # 0.0 - 0.5 K/uL 0.0   Baso # 0.00 - 0.20 K/uL 0.04   Immature Grans (Abs) 0.00 - 0.04 K/uL 0.02   nRBC 0 /100 WBC 0   Differential Method  Automated   Sed Rate 0 - 23 mm/Hr <2   Sodium 136 - 145 mmol/L 139   Potassium 3.5 - 5.1 mmol/L 4.0   Chloride 95 - 110 mmol/L 102   CO2 23 - 29 mmol/L 27   Anion Gap 8 - 16 mmol/L 10   BUN 6 - 20 mg/dL 11   Creatinine 0.5 - 1.4 mg/dL 1.0   eGFR >60 mL/min/1.73 m^2 >60.0   Glucose 70 - 110 mg/dL 103   Calcium 8.7 - 10.5 mg/dL 9.7   ALP 55 - 135 U/L 53 (L)   PROTEIN TOTAL 6.0 - 8.4 g/dL 7.5   Albumin 3.5 - 5.2 g/dL 4.8   BILIRUBIN TOTAL 0.1 - 1.0 mg/dL 1.3 (H)   AST 10 - 40 U/L 38   ALT 10 -  44 U/L 33   CRP 0.0 - 8.2 mg/L 0.6   CPK 20 - 200 U/L 577 (H)   (H): Data is abnormally high  (L): Data is abnormally low    PFTs        FVC     FEV1/FVC     TLC       RV      DLco    5/22/24    85.1       87                 70.9      42        81.6  11/29/23  88.9       86                 77.8      54.1     82.7  6/12/23    90.9       88                 78         50.7     92.5  2/2/23      75.4       85                 71.3      42.2     75.6  8/17/22    83.9       88                 74.9      41.1     89.2  4/14/21    86.5       86                 75         52.1     95       6 MW 5/22/24:  CLINICAL INTERPRETATION:  Six minute walk distance is 198.12 meters (650 feet) with heavy dyspnea.  At rest, oxygen saturation was moderately decreased while breathing room air.  During exercise, there was no desaturation while breathing room air.  Blood pressure decreased significantly and Heart rate increased significantly with walking.  This may represent a hypertensive response to exercise.  The patient reported non-pulmonary symptoms during exercise.  Significant exercise impairment is likely due to cardiovascular causes and subjective symptoms.  The patient did complete the study, walking 360 seconds of the 360 second test.  Since the previous study in February 2023, exercise capacity may be somewhat worse.  Based upon age and body mass index, exercise capacity is less than predicted.          EXAMINATION:  CT CHEST WITHOUT CONTRAST     CLINICAL HISTORY:  Interstitial lung disease; Interstitial pulmonary disease, unspecified     TECHNIQUE:  Low dose axial images, sagittal and coronal reformations were obtained from the thoracic inlet to the lung bases. Contrast was not administered.     COMPARISON:  10/05/2023     FINDINGS:  The thyroid appears normal.  The main central airways are patent.  The esophagus appears normal.  The heart is normal in size.  No pericardial effusion.  Small mediastinal lymph nodes, not enlarged  by CT criteria.  No hilar or axillary adenopathy is seen.     Evaluation of the lungs is somewhat limited secondary to respiratory motion artifact.  The previously noted ground-glass opacity in the right upper lobe is no longer seen on today's study.  No parenchymal consolidation or pleural effusions.     Hepatic cysts.     Impression:     Interval resolution of the ground glass in the right upper lobe.  No new process is seen.     Hepatic cysts.        Electronically signed by:Fabby Gordillo MD  Date:                                            12/01/2023  Time:                                           16:21        Fibroscan 2/14/24:    Findings  Median liver stiffness score:  5.8  CAP Reading: dB/m:  305     IQR/med %:  12  Interpretation  Fibrosis interpretation is based on medial liver stiffness - Kilopascal (kPa).     Fibrosis Stage:  F 0-1  Steatosis interpretation is based on controlled attenuation parameter - (dB/m).     Steatosis Grade:  S3             Three-view left foot    History: Chronic foot pain    TECHNIQUE: AP oblique and lateral left foot    FINDINGS: There is a lucency present at the base of the fifth metatarsal. It does have sclerotic margins but it's horizontal in nature and the findings consistent with an unhealed fracture.    IMPRESSION:   Fibrous nonunion fracture base fifth metatarsal    Finalized by Perfecto Ballard MD  7/18/2023 2:40 PM  by CRS  Procedure Note     Perfecto Ballard MD - 07/18/2023  Formatting of this note might be different from the original.  Three-view left foot    History: Chronic foot pain    TECHNIQUE: AP oblique and lateral left foot    FINDINGS: There is a lucency present at the base of the fifth metatarsal. It does have sclerotic margins but it's horizontal in nature and the findings consistent with an unhealed fracture.    IMPRESSION:   Fibrous nonunion fracture base fifth metatarsal    Finalized by Perfecto Ballard MD  7/18/2023 2:40 PM  by CRS           TTE:   6/12/23:  The left ventricle is normal in size with normal systolic function. The estimated ejection fraction is 60%.  Normal right ventricular size with normal right ventricular systolic function.  Normal left ventricular diastolic function.  The estimated PA systolic pressure is 22 mmHg.  Normal central venous pressure (3 mmHg).      EXAMINATION:  MRI FEMUR W WO CONTRAST RIGHT; MRI FEMUR W WO CONTRAST LEFT     CLINICAL HISTORY:  myositis;  Other symptoms and signs involving the musculoskeletal system     TECHNIQUE:  Routine multisequence multiplanar MRI soft tissue bilateral thigh protocol performed with the administration of 10 cc of Gadavist IV contrast.     COMPARISON:  None     FINDINGS:  The musculature of the thigh demonstrates normal signal.  No atrophy or strain.  No abnormal enhancement.  No fluid collections or masses.  No inguinal lymphadenopathy.  The hamstring muscle complex origin is unremarkable.  The marrow signal is within normal limits.     Impression:     No evidence of myositis in the thighs.        Electronically signed by:Hemant Danielle MD  Date:                                            04/12/2024  Time:                                           14:52                                Three-view left foot    History: Chronic foot pain    TECHNIQUE: AP oblique and lateral left foot    FINDINGS: There is a lucency present at the base of the fifth metatarsal. It does have sclerotic margins but it's horizontal in nature and the findings consistent with an unhealed fracture.    IMPRESSION:   Fibrous nonunion fracture base fifth metatarsal    Finalized by Perfecto Ballard MD  7/18/2023 2:40 PM  by CRS  Procedure Note     Perfecto Ballard MD - 07/18/2023  Formatting of this note might be different from the original.  Three-view left foot    History: Chronic foot pain    TECHNIQUE: AP oblique and lateral left foot    FINDINGS: There is a lucency present at the base of the fifth metatarsal. It does have  "sclerotic margins but it's horizontal in nature and the findings consistent with an unhealed fracture.    IMPRESSION:   Fibrous nonunion fracture base fifth metatarsal    Finalized by Perfecto Ballard MD  7/18/2023 2:40 PM  by CRS     Exam End: 07/18/23 11:31      TTE:  6/12/23:  The left ventricle is normal in size with normal systolic function. The estimated ejection fraction is 60%.  Normal right ventricular size with normal right ventricular systolic function.  Normal left ventricular diastolic function.  The estimated PA systolic pressure is 22 mmHg.  Normal central venous pressure (3 mmHg).                     Saw Dr. Duffy and Baltazar in Cardiology 7/26/23:     #Dyspnea on exertion  RESOLVED  - due to his RA medications  - symptoms have resolved since d/c of MTX and leflunomide     #HTN:  BP in clinic today at goal  - continue home amlodipine 5mg qD and telmisartan-HCTZ 80-25mg qD     #HLD:  Last LDL 90  - continue home atorvastatin 20mg qD     #RA:  - follows with rheumatology, on rynvoq and hcq     Saw Jessi Dias in [facility] 11/29/23:            ASSESSMENT:     Methotrexate 25mg sc once a week since onset but held with concern for methotrexate contribution to restrictive lung disease but CT chest negative but never resumed. However fibroscan shows 2-3 fibrosis however repeat now 0-1 fibrosis 3+ fat  Leflunomide 20mg daily stopped  "                               "  Prednisone 10mg daily has not been able to taper     Etanercept: ineffective  Adalimumab: ineffective  Abatacept:Ineffective  Tocilizumab: minimally effective  Rituximab 1000mg IV 2/24/23 and 3/10/23, only 2 weeks improvement, then back to baseline   Jakinibs: risk: obesity   The 10-year ASCVD risk score (Lucita BOYD, et al., 2019) is: 5.1%              RA RF- ACPA-NADYA+ CarP+  TJ 20  SJ 2  pt global 55  ESR 7   CRP 1.6   DAS28 5.38 FAW14-HZA 5.32  CDAI  35(all HDA but driven by  10x tender/swollen joint counts, indicating central " sensitization  Methotrexate and leflunomide stopped 7/17/23 again with hepatic fibrosis on fibroscan which has  now resolved, could consider resumption of leflunomide  with close monitoring of fibroscan depending on Hepatology opinion  Hydroxychloroquine started 7/17/23  Sulfasalazine-EC 1000mg twice daily started 6/12/23  Upadacitinib 15mg daily started 7/17/23  Mild restrictive lung disease with normal HRCT chest, following with Dr. 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   Oral thrush       PLAN:       Nystatin S & S  Left foot x-ray  CBC, CMP, ESR, CRP, CK  aldolase LD hydroxychloroquine blood level  HMGCR antibody today  *new Covid vaccine in 2 months  *Prevnar 23  *Fluad  *discuss with Dr. Gamboa changing venlafaxine 37.5mg to duloxetine 30mg daily and if tolerated increase to 60mg daily  Continue  sulfasalazine-EC to 1500mg twice daily   Continue hydroxychloroquine 200mg twice daily had Ophthalmology check 2/2024  Continue upadacitinib 15mg daily   Cont prednisone 10mg daily given HDA  Try decrease prednisone 9 mg daily x 4 wks, then 8mg daily x 4 wks, then 7mg daily and continue until RTC  If flares consider resumption of leflunomide starting with 10mg daily and close monitoring of liver depending on Hepatology opinion   EMG/NCV one arm and leg  as ordered 11/29/23  prior to  planned muscle biopsy  *Re- Ref to [facility](Dr. Sanchez) pt and wife request for evaluation of left 5th metatarsal base Fx non-union. And now repeat trauma to left plantar 1,2 metatarsal  [id number] karen Mediterranean diet  F/u Dr. Shore as scheduled 11/20/24 with PFTs  Needs TTE f/u as well as previously ordered  RTC 3 months with CBC, CMP, ESR, CRP CK aldolase LD

## 2024-10-16 NOTE — PROGRESS NOTES
Subjective:      Patient ID: Joaquín Rod is a 54 y.o. male w/ a hx of HTN, HLD, MASLD, seizures, RA, myositis and ILD here for follow up.    Chief Complaint: Disease Management    HPI    Interval hx:  - Started having some seizures since he was last here. Saw a neurologist - put him on keppra. Tests all came back normal (MRI, brain wave tests). Since starting the keppra has been a lot more alert. Had several seizures, was never hospitalized. They would be short. Thinks there may have been absence seizures prior  - A week ago had a fall in the shower - has broken foot before so had the boot available. Does feel like it's improved    Current symptoms - Feels joints are a lot less swollen. Hands felt warm last night but that's improved some. Feels that the rinvoq has given him the best response of all drugs he's tried.     Breathing is okay. Has lung tests scheduled in November. Goes for a walk every day. Most days 2+ miles.     Feels that his thighs are weaker. And his arms - can hold his granddaughter for a little bit but then feels arms giving out. Pretty equal on both sides. Does feel like his right side is probably worse than left.     Last labs 5/2024 normal.     Had been an ordered MRI hands/wrists, referral to ortho, wanted a second rheum opinion.    Surgical history - hernia repair     Social/tox -    Living situation - [place] with wife   Occupation - Not working currently   Diet - Diet is getting better    Exercise - walks outside    Alcohol - no   Smoking - no but sometimes uses smokeless tobacco (snuff/chew) using it a lot less than he was   Other drug use - no      Review of Systems   Constitutional:  Negative for fatigue, fever and unexpected weight change.   HENT:  Negative for facial swelling.    Eyes:  Negative for visual disturbance.   Respiratory:  Negative for cough and shortness of breath.    Cardiovascular:  Negative for chest pain.   Gastrointestinal:  Negative for constipation and  "diarrhea.   Genitourinary:  Negative for dysuria.   Skin:  Negative for rash.   Neurological:  Negative for weakness and headaches.   Hematological:  Negative for adenopathy.   Psychiatric/Behavioral:  Negative for behavioral problems.         Objective:   /84 (BP Location: Left arm, Patient Position: Sitting)   Pulse 88   Ht 5' 8" (1.727 m)   Wt 110.7 kg (244 lb)   SpO2 97%   BMI 37.10 kg/m²   Physical Exam   Constitutional: He is oriented to person, place, and time. No distress.   HENT:   Head: Normocephalic and atraumatic.   Cardiovascular: Normal rate, regular rhythm and normal heart sounds.   Pulmonary/Chest: Effort normal. No respiratory distress. He has rhonchi (Fine crackles upper lobes).   Abdominal: Soft.   Musculoskeletal:         General: Swelling (Indicated below) present. No tenderness. Normal range of motion.   Neurological: He is alert and oriented to person, place, and time.   Skin: Skin is warm and dry. No rash noted.   Psychiatric: His behavior is normal. Judgment and thought content normal.       Right Side Rheumatological Exam     Muscle Strength (0-5 scale):  Neck Flexion:  5  Neck Extension: 5  Deltoid:  4.8  Iliopsoas: 4  Quadriceps:  4.6     Left Side Rheumatological Exam     Muscle Strength (0-5 scale):  Neck Flexion:  5  Neck Extension: 5  Deltoid:  4.8  Iliopsoas: 4.2  Quadriceps:  4.6                11/29/2023 2/14/2024 5/22/2024 10/16/2024   Tender (JOSEPH-28) 12 / 28 18 / 28 18 / 28 20 / 28    Swollen (JOSEPH-28) 8 / 28  0 / 28  10 / 28  7 / 28    Provider Global 25 / 100 15 / 100 -- 25 / 100   Patient Global 55 / 100 80 / 100 -- 55 / 100   ESR 4 mm/hr -- -- 7 mm/hr   CRP 1.1 mg/L -- -- 1.6 mg/L   JOSEPH-28 (ESR) 4.47 (Moderate disease activity) -- -- 5.38 (High disease activity)   JOSEPH-28 (CRP) 4.73 (Moderate disease activity) -- -- 5.32 (High disease activity)   CDAI Score 28  27.5  -- 35         Assessment/Plan:     1. Rheumatoid arthritis involving both shoulders with negative " rheumatoid factor    2. Encounter for monitoring immunosuppressive medication therapy causing immunodeficiency    3. Muscle weakness    4. Chronic right shoulder pain    5. Left foot pain      1. Interstitial pulmonary disease, unspecified    2. Rheumatoid arthritis, involving unspecified site, unspecified whether rheumatoid factor present       Seronegative RF  Fibromyalgia  Pt here for follow up of seronegative RF as well as diffuse pain from fibromyalgia. Had been on rinvoq, sulfasalazine, HCQ and pred and doing well. Minimal synovitis on exam today.   Diagnosis/Important Hx: Has diffuse tenderness but not as much swelling on exam  Relevant serologies: +CarP ab, negative RF/CCP  Previous Therapy: TNF, Orencia, rituxan, and Actemra, MTX/Avara has been held 2/2 liver issues  Current Therapy: Rinvoq, sulfasalazine 1500mg BID, HCQ, prednisone 10mg daily  Imaging/Procedures:   10/2024   Foot XR - showed previous foot fx, no inflammation or new fracture present  Other notes:   PLAN  - Continue rinvoq, sulfasalazine, HCQ  - Reduce prednisone to 9mg daily for 30 days --> 8mg daily x30 days --> 7mg daily x30d  - Got XR of foot due to trauma - appears normal w/ known hx of fifth tarsal fracture  - Recommend consideration of switching to duloxetine for fibromyalgia previously  - F/u in 3 mo     ILD  Has a budesonide nebulizer - doesn't use it every day but does use it on worse days. Primarily GGO in the upper lungs - improved on last CT scan 11/2023. Lungs fairly clear with light crackles in upper lobes. Does follow with pulmonology.  - Continue to follow with advanced lung     Suspected Myositis  Pt has had some bilateral proximal muscle weakness that was getting worked up. Had a negative MRI femur done 4/2024.  Diagnosis/Important Hx:   Relevant serologies: CK mildly elevated in 300s, aldolase nl  Previous Therapy: N/a  Current Therapy: N/a  Imaging/Procedures:   4/2024   MRI femur negative  Awaiting EMG  Other notes:    PLAN  - EMG/NCS  - Repeat CK/aldolase (CK was more elevated than prior in 500s)    Rheumatology Health Maintenance  Vaccinations: Pneumonia, COVID, flu (high dose printed today)  Medication monitoring:    HCQ - eye exam 2/2024 was normal   Upadacitinib  Osteoporosis:    DEXA - Osteopenia 10/2023   Treatment - N/A  Cardiovascular risk:    BP -    Lipids -    Glucose -       Problem List Items Addressed This Visit          Immunology/Multi System    Rheumatoid arthritis - Primary    Relevant Medications    predniSONE 1 mg TbEC    Other Relevant Orders    Ambulatory referral/consult to Orthopedics    CBC W/ AUTO DIFFERENTIAL    COMPREHENSIVE METABOLIC PANEL    CK    Hydroxychloroquine (Plaquenil), blood    Aldolase    HMGCR (HMG Coenzyme A Reductase) Ab    Sedimentation rate    C-REACTIVE PROTEIN       Orthopedic    Left foot pain    Relevant Orders    X-Ray Foot Complete Left (Completed)     Other Visit Diagnoses       Encounter for monitoring immunosuppressive medication therapy causing immunodeficiency        Relevant Orders    Ambulatory referral/consult to Orthopedics    CBC W/ AUTO DIFFERENTIAL    COMPREHENSIVE METABOLIC PANEL    CK    Hydroxychloroquine (Plaquenil), blood    Aldolase    HMGCR (HMG Coenzyme A Reductase) Ab    Sedimentation rate    C-REACTIVE PROTEIN    Muscle weakness        Relevant Orders    CK    Aldolase    HMGCR (HMG Coenzyme A Reductase) Ab    EMG W/ ULTRASOUND AND NERVE CONDUCTION TEST 2 Extremities    Chronic right shoulder pain        Relevant Orders    Ambulatory referral/consult to Orthopedics          This patient encounter was staffed and the plan was formulated with rheumatology attending Dr. Mcgovern.    Kyra Maciel MD  Rheumatology Fellow, PGY-4

## 2024-10-16 NOTE — PATIENT INSTRUCTIONS
It was nice to meet you today!    We will get labs done today and in three months as well as a foot xr today.    We will have you get your high dose flu and your pneumonia vaccine today.    We also would like you to get an EMG test to test your muscle. I have also placed an ortho referral.     Lower by 1mg every 30 days. Start 9mg daily once you  the new prescription.     Please follow up in three months.       Kyra Maciel MD  Rheumatology Fellow, PGY-4

## 2024-10-30 ENCOUNTER — TELEPHONE (OUTPATIENT)
Dept: RHEUMATOLOGY | Facility: CLINIC | Age: 54
End: 2024-10-30
Payer: MEDICARE

## 2024-10-30 ENCOUNTER — PATIENT MESSAGE (OUTPATIENT)
Dept: RHEUMATOLOGY | Facility: CLINIC | Age: 54
End: 2024-10-30
Payer: MEDICARE

## 2024-10-30 RX ORDER — HYDROXYCHLOROQUINE SULFATE 200 MG/1
200 TABLET, FILM COATED ORAL 2 TIMES DAILY
Qty: 180 TABLET | Refills: 0 | Status: SHIPPED | OUTPATIENT
Start: 2024-10-30 | End: 2024-10-31 | Stop reason: SDUPTHER

## 2024-10-31 RX ORDER — HYDROXYCHLOROQUINE SULFATE 200 MG/1
200 TABLET, FILM COATED ORAL 2 TIMES DAILY
Qty: 180 TABLET | Refills: 0 | Status: SHIPPED | OUTPATIENT
Start: 2024-10-31

## 2024-11-04 ENCOUNTER — TELEPHONE (OUTPATIENT)
Dept: RHEUMATOLOGY | Facility: CLINIC | Age: 54
End: 2024-11-04
Payer: MEDICARE

## 2024-11-05 NOTE — TELEPHONE ENCOUNTER
Received from [facility]  Hemant Rod DO      Approval to continue Plaquenil for next 6 months   f/u with OCT macula every 6 months and 10-2 VF in one year.   +cataracts  +vitreous floaters

## 2024-11-07 ENCOUNTER — PROCEDURE VISIT (OUTPATIENT)
Dept: NEUROLOGY | Facility: CLINIC | Age: 54
End: 2024-11-07
Payer: MEDICARE

## 2024-11-07 DIAGNOSIS — M62.81 MUSCLE WEAKNESS: ICD-10-CM

## 2024-11-07 NOTE — PROCEDURES
Department of Neurology  Phone No: [phone], Fax: [phone]    Neurography & Electromyography Report        Full Name: Joaquín Rod Gender: Male  Patient ID: [id number] YOB: 1970      Visit Date: 11/7/2024 1:33 PM  Age: 54 Years  Examining Physician: Minerva Reyes MD  Height: 5 feet 8 inch  Weight: 244 lbs        Joaquín Rod [id number] 11/7/2024 1:33 PM     Reason for Referral:    Concern for muscle disease.      Relevant medical diagnoses:    Rheumatoid arthritis.    Hyper CKemia  Morbid obesity.      Joaquín Rod [id number] 11/7/2024 1:33 PM       History and Examination:    54-year-old male with mildly high CPK for evaluation.  On examination, patient ambulates with a Rollator walker with questionable hip flexion weakness but normal strength in upper extremities.  No obvious muscle loss.    Technical Difficulties:    None.      Interpretation:     Normal study.  There is no electrodiagnostic evidence of mononeuropathy, polyneuropathy or myopathy or lumbosacral radiculopathy.    Please correlate clinically.    Minerva Reyes MD, FRCPE, FCPS, CHCQM  Neuromuscular Consultant  [facility]    Joaquín Rod [id number] 11/7/2024 1:33 PM                  Sensory NCS      Nerve / Sites Rec. Site Segments Onset Lat Peak Lat Onset [place] Temp. Amp Distance      ms ms m/s °C µV mm   L Median - Dig II (Antidromic)      Wrist Index Wrist - Index 2.40 3.02 58.4 33.9 36.2 140      Ref.  Ref. <=3.30 <=4.00   >=7.0    R Median - Dig II (Antidromic)      Wrist Index Wrist - Index 2.34 3.02 59.7 32.9 33.7 140      Ref.  Ref. <=3.30 <=4.00   >=7.0    L Ulnar - Dig V (Antidromic)      Wrist Dig V Wrist - Dig V 2.03 2.71 54.2 34.4 26.4 110      Ref.  Ref. <=3.10 <=4.00   >=5.0    R Ulnar - Dig V (Antidromic)      Wrist Dig V Wrist - Dig V 1.88 2.76 58.7 33.6 25.0 110      Ref.  Ref. <=3.10 <=4.00   >=5.0    L Radial - Superficial (Antidromic)      Forearm Wrist Forearm - Wrist  1.35 2.03 73.8 34.7 34.5 100      Ref.  Ref. <=2.20 <=2.80   >=7.0    R Radial - Superficial (Antidromic)      Forearm Wrist Forearm - Wrist 1.20 1.77 83.5 34 37.6 100      Ref.  Ref. <=2.20 <=2.80   >=7.0    L Sural - (Antidromic)      Calf Ankle Calf - Ankle 2.76 3.33 50.7 31.3 8.4 140      Ref.  Ref. <=3.60 <=4.50   >=4.0    R Sural - (Antidromic)      Calf Ankle Calf - Ankle 2.55 3.28 54.9 31 9.9 140      Ref.  Ref. <=3.60 <=4.50   >=4.0    L Superficial peroneal - (Antidromic)      Lat leg Ankle Lat leg - Ankle 2.31 2.90 60.5 31.5 4.6 140      Ref.  Ref.  <=4.40   >=5.0    R Superficial peroneal - (Antidromic)      Lat leg Ankle Lat leg - Ankle 2.08 2.60 57.6 31 14.5 120      Ref.  Ref.  <=4.40   >=5.0        Motor NCS      Nerve / Sites Muscle Segments Latency Ref. Velocity Ref. Amplitude Ref. Temp. Dur. Distance      ms ms m/s m/s mV mV °C ms mm   L Median - APB      Wrist APB Wrist - APB 3.04 <=4.70   8.1 >=4.2 34.5 5.8 80      Elbow APB Elbow - Wrist 6.94  56 >=47 4.5  34.5 5.8 220   R Median - APB      Wrist APB Wrist - APB 2.94 <=4.70   11.2 >=4.2 34 5.8 80      Elbow APB Elbow - Wrist 6.40  61 >=47 6.2  34.1 5.2 210   L Ulnar - ADM      Wrist ADM Wrist - ADM 2.19 <=3.70   14.1 >=7.9 34.3 4.3 80      B.Elbow ADM B.Elbow - Wrist 5.54  57 >=52 13.8  34 4.5 190      A.Elbow ADM A.Elbow - B.Elbow 6.81  71 >=43 13.6  34.4 4.4 90     A.Elbow - Wrist       34.4     R Ulnar - ADM      Wrist ADM Wrist - ADM 2.31 <=3.70   13.9 >=7.9 33.9 4.6 80      B.Elbow ADM B.Elbow - Wrist 5.79  57 >=52 13.2  34.3 4.6 200      A.Elbow ADM A.Elbow - B.Elbow 6.81  78 >=43 13.0  34.3 4.5 80     A.Elbow - Wrist       34.3     L Peroneal - EDB      Ankle EDB Ankle - EDB 3.69 <=6.50   8.5 >=1.1 31.2 5.3 80      B. Fib Head EDB B. Fib Head - Ankle 9.88  50 >=36 7.3  31.2 5.9 310      A. Fib Head EDB A. Fib Head - B. Fib Head 11.58  53 >=42 7.2  31.2 6.3 90   R Peroneal - EDB      Ankle EDB Ankle - EDB 3.52 <=6.50   7.6 >=1.1 31.1 5.3  80      B. Fib Head EDB B. Fib Head - Ankle 9.77  48 >=36 6.5  31.1 5.4 300      A. Fib Head EDB A. Fib Head - B. Fib Head 11.60  55 >=42 6.5  31.1 5.1 100   L Tibial - AH      Ankle AH Ankle - AH 4.38 <=6.10   8.2 >=5.3 31.1 3.8 80   R Tibial - AH      Ankle AH Ankle - AH 3.17 <=6.10   13.3 >=5.3 31 4.7 80   L Peroneal - EDB (PED)      Ankle EDB Ankle - EDB 3.54    7.9  31.3 5.8        F  Wave      Nerve F Latency Ref. M Latency F - M Lat    ms ms ms ms   L Peroneal - EDB 46.6 <=63.6 4.5 42.1   L Tibial - AH 46.0 <=61.1 4.8 41.3   R Peroneal - EDB 47.0 <=63.6 4.2 42.9   R Tibial - AH 45.2 <=61.1 3.6 41.5   L Median - APB 25.3 <=31.5 3.5 21.7   L Ulnar - ADM 24.8 <=30.9 2.6 22.3   R Median - APB 24.8 <=31.5 3.3 21.5   R Ulnar - ADM 24.8 <=30.9 2.6 22.2       EMG Summary Table     Spontaneous Recruitment MUAP   Muscle Nerve Roots IA Fib PSW Fasc Other Pattern Amp Dur. PPP   R. Tibialis anterior Deep peroneal (Fibular) L4-L5 1+ None None None N N N N N   R. Gastrocnemius Tibial S1-S2 N None None None N N N N N   R. Quadriceps Femoral L2-L4 N None None None N N N N N   R. Gluteus medius Superior gluteal L4-S1 N None None None N N N N N   L. Tibialis anterior Deep peroneal (Fibular) L4-L5 N None None None N N N N N   R. First dorsal interosseous Ulnar C8-T1 N None None None N N N N N   R. Triceps brachii Radial C6-C8 N None None None N N N N N

## 2024-11-19 ENCOUNTER — TELEPHONE (OUTPATIENT)
Dept: TRANSPLANT | Facility: CLINIC | Age: 54
End: 2024-11-19
Payer: MEDICARE

## 2024-11-19 NOTE — PROGRESS NOTES
[facility]  Progres Note    Chief Complaint: focal RUL pneumonitis      HPI     Joaquín Rod is a 54 y.o. male with a history of RA, seizures, MASLD, HTN, HLD presenting with chief complaint of focal RUL pneumonitis     # focal RUL pneumonitis   # hx of RA w/ anti-CaRP     - Seen on CT chest 10/2023 Personally interpreted faint RUL GGO   - was switched from RTX -> rinvoq in this setting, continued on MTX, plaquenil and prednisone w/ improvement in RUL opacities   - now on rinvoq, sulfasalazine, plaquenil, prednisone 10  - recent EMG normal    - PFTs today stable with mild restriction likely from obesity   - sleeps with CPAP  - he walks every day 2-3 miles/day      Objective   Past History     Past Medical History:  Past Medical History:   Diagnosis Date    HLD (hyperlipidemia)     HTN (hypertension)     MARTI on CPAP     Rheumatoid arthritis, unspecified          Past Surgical History:  Past Surgical History:   Procedure Laterality Date    REPAIR, HERNIA, INGUINAL Right 2002         Social History:   Social History     Socioeconomic History    Marital status:    Tobacco Use    Smoking status: Never     Passive exposure: Never    Smokeless tobacco: Current     Types: Chew    Tobacco comments:     Chew - sometimes - not as much as he used to   Substance and Sexual Activity    Alcohol use: Never    Drug use: Never    Sexual activity: Yes     Partners: Female         Allergies and Pertinent Medications   Allergies and Medications Reviewed    Patient has no known allergies.  Current Outpatient Medications on File Prior to Visit   Medication Sig Dispense Refill    amLODIPine (NORVASC) 10 MG tablet amlodipine 10 mg tablet   TAKE 1 TABLET BY MOUTH EVERY DAY      atorvastatin (LIPITOR) 20 MG tablet atorvastatin 20 mg tablet   TAKE 1 TABLET BY MOUTH EVERY DAY      budesonide 1 mg/2 mL NbSp budesonide 1 mg/2 mL suspension for nebulization   INHALE 2 ML TWICE A DAY BY NEBULIZATION ROUTE AS NEEDED.  "     busPIRone (BUSPAR) 10 MG tablet Take 10 mg by mouth 2 (two) times daily.      fluticasone propionate (FLONASE) 50 mcg/actuation nasal spray fluticasone propionate 50 mcg/actuation nasal spray,suspension   SPRAY 2 SPRAYS INTO EACH NOSTRIL EVERY DAY      folic acid (FOLVITE) 1 MG tablet Take 1 tablet by mouth once daily 180 tablet 5    hydroxychloroquine (PLAQUENIL) 200 mg tablet Take 1 tablet (200 mg total) by mouth 2 (two) times daily. 180 tablet 0    metoprolol succinate (TOPROL-XL) 25 MG 24 hr tablet TAKE 1 TABLET BY MOUTH EVERY DAY FOR BLOOD PRESSURE > 140/90 OR HEART RATE > 100      predniSONE 1 mg TbEC Take 9 mg by mouth once daily for 30 days, THEN 8 mg once daily for 30 days, THEN 7 mg once daily. 300 tablet 1    sulfamethoxazole-trimethoprim 400-80mg (BACTRIM,SEPTRA) 400-80 mg per tablet Take 1 tablet by mouth every Mon, Wed, Fri. (Patient not taking: Reported on 10/16/2024) 540 tablet 0    sulfaSALAzine (AZULFIDINE) 500 MG EC tablet Take 3 tablets (1,500 mg total) by mouth 2 (two) times a day. 540 tablet 1    telmisartan-hydrochlorothiazide (MICARDIS HCT) 80-25 mg per tablet telmisartan 80 mg-hydrochlorothiazide 25 mg tablet   TAKE 1 TABLET BY MOUTH EVERY DAY      upadacitinib (RINVOQ) 15 mg 24 hr tablet Take 1 tablet (15 mg total) by mouth once daily. 15 tablet 1    venlafaxine (EFFEXOR) 37.5 MG Tab Take 37.5 mg by mouth once daily.       No current facility-administered medications on file prior to visit.              Physical Exam   /82 (BP Location: Right arm, Patient Position: Sitting)   Pulse 79   Temp 98.4 °F (36.9 °C) (Oral)   Resp 20   Ht 5' 6" (1.676 m)   Wt 112.9 kg (249 lb)   SpO2 (!) 94%   BMI 40.19 kg/m²       Constitutional: No acute distress, Atraumatic   HEENT: moist mucus membranes, extraocular movements intact  Cardiovascular: regular rate and rhythm, no murmurs, rubs or gallops  Pulmonary: normal respiratory rate and chest rise, no chest wall deformity, normal breath " "sounds with no wheezing or crackles  Abdominal: non-distended, bowel sounds present  Musculoskeletal: No lower extremity edema, no clubbing  Neurological: normal speech/ace, moves all extremities against gravity  Skin: no finger cyanosis, no rashes on exposed body parts  Psych: Appropriate affect, normal mood       Diagnostic Studies      All diagnostic studies relevant to chief complaint reviewed personally    Labs:  Lab Results   Component Value Date    WBC 6.77 10/16/2024    HGB 15.5 10/16/2024     10/16/2024    MCV 93 10/16/2024     Lab Results   Component Value Date     10/16/2024    K 4.0 10/16/2024    CO2 27 10/16/2024    BUN 11 10/16/2024    CREATININE 1.0 10/16/2024    MG 2.0 06/08/2022     Lab Results   Component Value Date    AST 38 10/16/2024    ALT 33 10/16/2024    ALBUMIN 4.8 10/16/2024    PROT 7.5 10/16/2024    BILITOT 1.3 (H) 10/16/2024         PFTs:  Pulmonary Functions Testing Results:  No results found for: "FEV1", "FVC", "STA2BIS", "TLC", "DLCO"         TTE:  Results for orders placed during the hospital encounter of 06/12/23    Echo    Interpretation Summary  · The left ventricle is normal in size with normal systolic function. The estimated ejection fraction is 60%.  · Normal right ventricular size with normal right ventricular systolic function.  · Normal left ventricular diastolic function.  · The estimated PA systolic pressure is 22 mmHg.  · Normal central venous pressure (3 mmHg).            Assessment and Plan   Joaquín Rod is a 54 y.o. male  with a history of RA, seizures, MASLD, HTN, HLD presenting with chief complaint of focal RUL pneumonitis and sleep apnea    Problem List:  # RUL pneumonitis - resolved, PFTs stable. Restriction pattern is consistent w obesity  # obstructive sleep apnea - chronic, stable - continue cpap nightly  # obesity - chronic, worsening - recommend weight loss for multitude of reasons, but will also help with restriction pattern seen on " PFTs. He will speak w/ his PCP Dr. Gamoba regarding GLP-1      Differential, diagnoses, diagnostic work up, and possible treatments were discussed with the patient. Questions were answered.    Follow up in 1 year w/ PFTs/walk, or sooner if clinical change    Venessa Correa MD  Pulmonary-Critical Care Medicine              For this visit, the following time was spent  preparing to see the patient (e.g., review of tests) 11 minutes  obtaining and/or reviewing separately obtained history 0 minutes  Performing a medically necessary appropriate examination and/or evaluation 10 minutes  Counseling and educating the patient/family/caregiver 5 minutes  Ordering medications, tests, or procedures 1 minutes  Referring and communicating with other health care professionals (when not reported separately) 4 minutes  Documenting clinical information in the electronic or other health record 5 minutes  Care coordination (not reported separately) 0 minutes    Total time = 36 minutes

## 2024-11-20 ENCOUNTER — HOSPITAL ENCOUNTER (OUTPATIENT)
Dept: CARDIOLOGY | Facility: HOSPITAL | Age: 54
Discharge: HOME | End: 2024-11-20
Attending: INTERNAL MEDICINE
Payer: MEDICARE

## 2024-11-20 ENCOUNTER — OFFICE VISIT (OUTPATIENT)
Dept: TRANSPLANT | Facility: CLINIC | Age: 54
End: 2024-11-20
Payer: MEDICARE

## 2024-11-20 ENCOUNTER — HOSPITAL ENCOUNTER (OUTPATIENT)
Dept: PULMONOLOGY | Facility: CLINIC | Age: 54
Discharge: HOME | End: 2024-11-20
Payer: MEDICARE

## 2024-11-20 ENCOUNTER — TELEPHONE (OUTPATIENT)
Dept: TRANSPLANT | Facility: CLINIC | Age: 54
End: 2024-11-20

## 2024-11-20 VITALS
HEART RATE: 82 BPM | DIASTOLIC BLOOD PRESSURE: 82 MMHG | WEIGHT: 249 LBS | HEART RATE: 79 BPM | HEIGHT: 66 IN | HEIGHT: 66 IN | DIASTOLIC BLOOD PRESSURE: 72 MMHG | TEMPERATURE: 98 F | BODY MASS INDEX: 40.02 KG/M2 | RESPIRATION RATE: 20 BRPM | OXYGEN SATURATION: 94 % | SYSTOLIC BLOOD PRESSURE: 132 MMHG | SYSTOLIC BLOOD PRESSURE: 127 MMHG | WEIGHT: 249 LBS | BODY MASS INDEX: 40.02 KG/M2

## 2024-11-20 VITALS — BODY MASS INDEX: 40.02 KG/M2 | HEIGHT: 66 IN | WEIGHT: 249 LBS

## 2024-11-20 DIAGNOSIS — M06.9 RHEUMATOID ARTHRITIS, INVOLVING UNSPECIFIED SITE, UNSPECIFIED WHETHER RHEUMATOID FACTOR PRESENT: ICD-10-CM

## 2024-11-20 DIAGNOSIS — J84.9 INTERSTITIAL PULMONARY DISEASE, UNSPECIFIED: ICD-10-CM

## 2024-11-20 DIAGNOSIS — M06.9 RHEUMATOID ARTHRITIS, INVOLVING UNSPECIFIED SITE, UNSPECIFIED WHETHER RHEUMATOID FACTOR PRESENT: Primary | ICD-10-CM

## 2024-11-20 DIAGNOSIS — R06.02 SHORTNESS OF BREATH: ICD-10-CM

## 2024-11-20 LAB
ASCENDING AORTA: 3.1 CM
AV AREA BY CONTINUOUS VTI: 3.6 CM2
AV INDEX (PROSTH): 1.08
AV LVOT MEAN GRADIENT: 2 MMHG
AV LVOT PEAK GRADIENT: 3 MMHG
AV MEAN GRADIENT: 2.2 MMHG
AV PEAK GRADIENT: 3.2 MMHG
AV VALVE AREA BY VELOCITY RATIO: 3.5 CM²
AV VALVE AREA: 3.7 CM2
AV VELOCITY RATIO: 1
BSA FOR ECHO PROCEDURE: 2.29 M2
CV ECHO LV RWT: 0.33 CM
DLCO ADJ PRE: 21.03 ML/(MIN*MMHG) (ref 19.84–33.7)
DLCO SINGLE BREATH LLN: 19.84
DLCO SINGLE BREATH PRE REF: 80.5 %
DLCO SINGLE BREATH REF: 26.77
DLCOC SBVA LLN: 2.89
DLCOC SBVA PRE REF: 116 %
DLCOC SBVA REF: 4.24
DLCOC SINGLE BREATH LLN: 19.84
DLCOC SINGLE BREATH PRE REF: 78.5 %
DLCOC SINGLE BREATH REF: 26.77
DLCOCSBVAULN: 5.58
DLCOCSINGLEBREATHULN: 33.7
DLCOCSINGLEBREATHZSCORE: -1.36
DLCOSINGLEBREATHULN: 33.7
DLCOSINGLEBREATHZSCORE: -1.24
DLCOVA LLN: 2.89
DLCOVA PRE REF: 118.8 %
DLCOVA PRE: 5.04 ML/(MIN*MMHG*L) (ref 2.89–5.58)
DLCOVA REF: 4.24
DLCOVAULN: 5.58
DLVAADJ PRE: 4.92 ML/(MIN*MMHG*L) (ref 2.89–5.58)
DOP CALC AO PEAK VEL: 0.9 M/S
DOP CALC AO VTI: 17.1 CM
DOP CALC LVOT AREA: 3.5 CM2
DOP CALC LVOT DIAMETER: 2.1 CM
DOP CALC LVOT PEAK VEL: 0.9 M/S
DOP CALC LVOT STROKE VOLUME: 64 CM3
DOP CALCLVOT PEAK VEL VTI: 18.5 CM
E WAVE DECELERATION TIME: 207.85 MS
E/A RATIO: 1.11
E/E' RATIO: 7.18 M/S
ECHO EF ESTIMATED: 83 %
ECHO LV POSTERIOR WALL: 0.7 CM (ref 0.6–1.1)
ERVN2 LLN: -16448.85
ERVN2 PRE REF: 8.9 %
ERVN2 PRE: 0.1 L (ref -16448.85–16451.15)
ERVN2 REF: 1.15
ERVN2ULN: ABNORMAL
FEF 25 75 LLN: 1.88
FEF 25 75 PRE REF: 125 %
FEF 25 75 REF: 3.59
FEV05 LLN: 1.39
FEV05 REF: 2.52
FEV1 FVC LLN: 67
FEV1 FVC PRE REF: 110.2 %
FEV1 FVC REF: 79
FEV1 LLN: 2.54
FEV1 PRE REF: 90.8 %
FEV1 REF: 3.3
FEV1FVCZSCORE: 1.31
FEV1ZSCORE: -0.67
FRACTIONAL SHORTENING: 50 % (ref 28–44)
FRCN2 LLN: 2.34
FRCN2 PRE REF: 38.7 %
FRCN2 REF: 3.33
FRCN2ULN: 4.31
FVC LLN: 3.23
FVC PRE REF: 82.3 %
FVC REF: 4.18
FVCZSCORE: -1.27
ICN2REF: 2.88
INTERVENTRICULAR SEPTUM: 0.7 CM (ref 0.6–1.1)
IVC DIAMETER: 1.46 CM
IVC PRE: 3.4 L (ref 3.23–5.15)
IVC SINGLE BREATH LLN: 3.23
IVC SINGLE BREATH PRE REF: 81.2 %
IVC SINGLE BREATH REF: 4.18
IVCSINGLEBREATHULN: 5.15
LA MAJOR: 4.03 CM
LA MINOR: 3.83 CM
LA WIDTH: 3.28 CM
LEFT ATRIUM SIZE: 3.52 CM
LEFT ATRIUM VOLUME INDEX MOD: 15.2 ML/M2
LEFT ATRIUM VOLUME INDEX: 17.5 ML/M2
LEFT ATRIUM VOLUME MOD: 33.37 ML
LEFT ATRIUM VOLUME: 38.54 CM3
LEFT INTERNAL DIMENSION IN SYSTOLE: 2.1 CM (ref 2.1–4)
LEFT VENTRICLE DIASTOLIC VOLUME INDEX: 36.26 ML/M2
LEFT VENTRICLE DIASTOLIC VOLUME: 79.77 ML
LEFT VENTRICLE MASS INDEX: 38.7 G/M2
LEFT VENTRICLE SYSTOLIC VOLUME INDEX: 6.2 ML/M2
LEFT VENTRICLE SYSTOLIC VOLUME: 13.56 ML
LEFT VENTRICULAR INTERNAL DIMENSION IN DIASTOLE: 4.2 CM (ref 3.5–6)
LEFT VENTRICULAR MASS: 85.1 G
LLN IC N2: -9999997.12
LV LATERAL E/E' RATIO: 6.78
LV SEPTAL E/E' RATIO: 7.63
MV PEAK A VEL: 0.55 M/S
MV PEAK E VEL: 0.61 M/S
OHS CV RV/LV RATIO: 0.93 CM
PEF LLN: 6.62
PEF PRE REF: 99.1 %
PEF REF: 8.68
PHYSICIAN COMMENT: ABNORMAL
PISA TR MAX VEL: 2.07 M/S
PRE DLCO: 21.54 ML/(MIN*MMHG) (ref 19.84–33.7)
PRE FEF 25 75: 4.48 L/S (ref 1.88–5.3)
PRE FET 100: 6.26 SEC
PRE FEV05 REF: 102.2 %
PRE FEV1 FVC: 87.04 % (ref 67.49–88.96)
PRE FEV1: 3 L (ref 2.54–4.03)
PRE FEV5: 2.58 L (ref 1.39–3.66)
PRE FRC N2: 1.29 L (ref 2.34–4.31)
PRE FVC: 3.44 L (ref 3.23–5.15)
PRE IC N2: 3.34 L (ref -9999997.12–#######.####)
PRE PEF: 8.6 L/S (ref 6.62–10.74)
PRE REF IC N2: 115.9 %
RA MAJOR: 4.42 CM
RA PRESSURE ESTIMATED: 3 MMHG
RA WIDTH: 3.08 CM
RIGHT ATRIAL AREA: 13.8 CM2
RIGHT VENTRICLE DIASTOLIC BASEL DIMENSION: 3.9 CM
RV TB RVSP: 5 MMHG
RV TISSUE DOPPLER FREE WALL SYSTOLIC VELOCITY 1 (APICAL 4 CHAMBER VIEW): 17.33 CM/S
RVN2 LLN: 1.5
RVN2 PRE REF: 54.5 %
RVN2 PRE: 1.19 L (ref 1.5–2.85)
RVN2 REF: 2.18
RVN2TLCN2 LLN: 26.43
RVN2TLCN2 PRE REF: 72.3 %
RVN2TLCN2 PRE: 25.61 % (ref 26.43–44.39)
RVN2TLCN2 REF: 35.41
RVN2TLCN2ULN: 44.39
RVN2ULN: 2.85
SINUS: 2.71 CM
STJ: 2.62 CM
TDI LATERAL: 0.09 M/S
TDI SEPTAL: 0.08 M/S
TDI: 0.09 M/S
TLCN2 LLN: 5.16
TLCN2 PRE REF: 73.3 %
TLCN2 PRE: 4.63 L (ref 5.16–7.47)
TLCN2 REF: 6.31
TLCN2ULN: 7.47
TLCN2ZSCORE: -2.41
TR MAX PG: 17 MMHG
TRICUSPID ANNULAR PLANE SYSTOLIC EXCURSION: 1.73 CM
TV PEAK GRADIENT: 17 MMHG
TV REST PULMONARY ARTERY PRESSURE: 20 MMHG
ULN IC N2: ABNORMAL
VA PRE: 4.28 L (ref 6.16–6.16)
VA SINGLE BREATH LLN: 6.16
VA SINGLE BREATH PRE REF: 69.4 %
VA SINGLE BREATH REF: 6.16
VASINGLEBREATHULN: 6.16
VCMAXN2 LLN: 3.23
VCMAXN2 PRE REF: 82.3 %
VCMAXN2 PRE: 3.44 L (ref 3.23–5.15)
VCMAXN2 REF: 4.18
VCMAXN2ULN: 5.15
Z-SCORE OF LEFT VENTRICULAR DIMENSION IN END DIASTOLE: -5.81
Z-SCORE OF LEFT VENTRICULAR DIMENSION IN END SYSTOLE: -6.14

## 2024-11-20 PROCEDURE — 93306 TTE W/DOPPLER COMPLETE: CPT | Mod: TXP

## 2024-11-20 PROCEDURE — 99999 PR PBB SHADOW E&M-EST. PATIENT-LVL III: CPT | Mod: PBBFAC,TXP,, | Performed by: INTERNAL MEDICINE

## 2024-11-20 NOTE — Clinical Note
Hi Dr. Mcgovern -- saw mr woods doing well/stable. His restriction pattern at this point is from obesity. He will speak w/ his pcp re: weight loss options. Thank you!

## 2024-11-20 NOTE — Clinical Note
Augustus Gibson -- can we fax my note to Dr. Rosaline Gamboa his PCP Phone: [phone] Fax: [phone] :  Just needs 1 year follow up w/ pfts and walk

## 2024-11-20 NOTE — PROCEDURES
Joaquín Rod is a 54 y.o.   male patient, who presents for a 6 minute walk test ordered by DO Mone.  The diagnosis is Connective Tissue Disease; Abnormal Chest Radiography.  The patient's BMI is 40.2 kg/m2.  Predicted distance (lower limit of normal) is 379.78 meters.      Test Results:    The test was completed without stopping.  The total time walked was 360 seconds.  During walking, the patient reported:  Dyspnea, Leg/hip pain.  The patient used a walker during testing.     11/20/2024---------Distance: 182.88 meters (600 feet)     Lap Walk Time O2 Sat % Supplemental Oxygen Heart Rate Blood Pressure Hu Scale Comment   Pre-exercise  (Resting) 0 0 97 % Room Air 104 bpm 122/83 mmHg 2    During Exercise 1 112 sec 96 % Room Air 112 bpm      During Exercise 2 230 sec 97 % Room Air 116 bpm      End of Exercise 3 360 sec 96 % Room Air 118 bpm 138/86 mmHg 4    Post-exercise  (Recovery)   97 % Room Air  107 bpm        Recovery Time: 92 seconds    Performing nurse/tech: Jadiel ESPARZA      PREVIOUS STUDY:   05/22/2024---------Distance: 198.12 meters (650 feet)       Lap Walk Time O2 Sat % Supplemental Oxygen Heart Rate Blood Pressure Hu Scale   Pre-exercise  (Resting) 0 0 92 % Room Air 69 bpm 155/94 mmHg 1   During Exercise 1 108 sec 97 % Room Air 97 bpm       During Exercise 2 221 sec 96 % Room Air 94 bpm       During Exercise 3 340 sec 97 % Room Air 98 bpm       End of Exercise   360 sec 98 % Room Air 99 bpm 134/77 mmHg 5-6   Post-exercise  (Recovery)     98 % Room Air  76 bpm          CLINICAL INTERPRETATION:  Six minute walk distance is 182.88 meters (600 feet) with somewhat heavy dyspnea.  During exercise, there was no significant desaturation while breathing room air.  Both blood pressure and heart rate remained stable with walking.  Tachycardia was present prior to exercise.  The patient reported non-pulmonary symptoms during exercise.  Significant exercise impairment is likely due to cardiovascular causes  and subjective symptoms.  The patient did complete the study, walking 360 seconds of the 360 second test.  Since the previous study in May 2024, exercise capacity is unchanged.  Based upon age and body mass index, exercise capacity is less than predicted.

## 2024-11-20 NOTE — LETTER
November 20, 2024        Roberto Carlos Tamez  [address]  [facility] [address]  Phone: [phone]  Fax: [phone]             [facility] 1st [facility]  [address]  [facility] [address]  Phone: [phone]   Patient: Joaquín Rod   MR Number: [id number]   YOB: 1970   Date of Visit: 11/20/2024       Dear Dr. Roberto Carlos Tamez    Thank you for referring Joaquín Rod to me for evaluation. Attached you will find relevant portions of my assessment and plan of care.    If you have questions, please do not hesitate to call me. I look forward to following Joaquín Rod along with you.    Sincerely,    Venessa Correa MD    Enclosure    If you would like to receive this communication electronically, please contact [email or web address] or [phone] to request [facility] Link access.    [facility] Link is a tool which provides read-only access to select patient information with whom you have a relationship. Its easy to use and provides real time access to review your patients record including encounter summaries, notes, results, and demographic information.    If you feel you have received this communication in error or would no longer like to receive these types of communications, please e-mail [email or web address]

## 2024-11-21 PROBLEM — J98.4 RESTRICTIVE LUNG DISEASE: Status: ACTIVE | Noted: 2024-11-21

## 2024-11-21 NOTE — TELEPHONE ENCOUNTER
----- Message from Venessa Correa MD sent at 11/20/2024  5:12 PM CST -----  Augustus Gibson -- can we fax my note to Dr. Rosaline Gamboa his PCP Phone: [phone]  Fax: [phone] :    Just needs 1 year follow up w/ pfts and walk

## 2024-11-23 DIAGNOSIS — M06.9 RHEUMATOID ARTHRITIS, INVOLVING UNSPECIFIED SITE, UNSPECIFIED WHETHER RHEUMATOID FACTOR PRESENT: Primary | ICD-10-CM

## 2024-11-23 DIAGNOSIS — J84.9 INTERSTITIAL PULMONARY DISEASE, UNSPECIFIED: ICD-10-CM

## 2024-11-24 NOTE — PROGRESS NOTES
Per provider note Dr. Correa / Mone, patient RTC 1 year with PFTs, 6MWT. Start on room air, modified. Request to .

## 2024-11-25 ENCOUNTER — PATIENT MESSAGE (OUTPATIENT)
Dept: TRANSPLANT | Facility: CLINIC | Age: 54
End: 2024-11-25
Payer: MEDICARE

## 2024-11-26 ENCOUNTER — PATIENT MESSAGE (OUTPATIENT)
Dept: RHEUMATOLOGY | Facility: CLINIC | Age: 54
End: 2024-11-26
Payer: MEDICARE

## 2025-01-28 NOTE — PROGRESS NOTES
Subjective:      Patient ID: Joaquín Rod is a 54 y.o. male w/ a hx of HTN, HLD, MASLD, seizures, RA, myositis and ILD here for follow up.    Chief Complaint: Disease Management    HPI    Interval hx:  - Saw neurologist again, has been stable on keppra  - Saw Dr. Correa 11/2024, no changes required, mild restriction on PFTs    Last , higher than previous    Down to 5mg daily of prednisone.     Got sick earlier in Jan. PCP gave abx for a respiratory infection he had and he held all his meds. Restarted all meds about a week or two ago.     Feels his hands are still pretty swollen. Left elbow feel swollen. Right shoulder hurts. Knees are hurting him too. Toes have been hurting.    Saw orthopedics in [place]. No surgery. Wore a boot for ~12 weeks.     Fibro pain - still feels pretty tender. Takes tylenol and uses arthritis gloves for pain.     Breathing is okay. Since his respiratory infection it's been a little worse.     Widespread Pain Index  Note the areas which the patient has had pain over the last week:                    Shoulder-girdle, left x               Shoulder-girdle, right x                         Upper arm left x                       Upper arm right x                         Lower arm left                        Lower arm right    Hip (buttock, trochanter) left   Hip (buttock, trochanter) right                            Upper leg, left x                         Upper leg, right x                           Lower leg, left                          Lower leg, right                                      Jaw, left                                   Jaw, right                                         Chest                                   Abdomen                                Upper back                               Lower back                                         Neck   Score will be from 0-19: 6/19    Symptom severity score  Fatigue 2  Waking Unrefreshed  2  Cognitive Symptoms 1   0 = no  problem, 1=slight or mild problem 2= moderate; considerable problems often present and/or at a moderate level, 3 = severe, pervasive, continuous, life disturbing problem  For each of the 3 symptoms, indicate the level of severity over the past week using the Scale.  The symptom severity score is the sum of the severity of the 3 symptoms (fatigue, waking unrefreshed, and cognitive symptoms) plus the number of the following symptoms occurring during the previous 6 months:   Headaches 0  Pain or cramps in the lower abdomen 0  Depression 1  The final score is between 0 and 12 : 6/12      Review of Systems   Constitutional:  Negative for fatigue, fever and unexpected weight change.   HENT:  Negative for facial swelling.    Eyes:  Negative for visual disturbance.   Respiratory:  Negative for cough and shortness of breath.    Cardiovascular:  Negative for chest pain.   Gastrointestinal:  Negative for constipation and diarrhea.   Genitourinary:  Negative for dysuria.   Skin:  Negative for rash.   Neurological:  Negative for weakness and headaches.   Hematological:  Negative for adenopathy.   Psychiatric/Behavioral:  Negative for behavioral problems.         Objective:   /88   Pulse 106   Wt 115.3 kg (254 lb 3.1 oz)   BMI 41.03 kg/m²   Physical Exam   Constitutional: He is oriented to person, place, and time. No distress.   HENT:   Head: Normocephalic and atraumatic.   Cardiovascular: Normal rate, regular rhythm and normal heart sounds.   Pulmonary/Chest: Effort normal. No respiratory distress. He has rhonchi (Fine crackles upper lobes).   Abdominal: Soft.   Musculoskeletal:         General: Swelling (Indicated below) present. No tenderness. Normal range of motion.   Neurological: He is alert and oriented to person, place, and time.   Skin: Skin is warm and dry. No rash noted.   Psychiatric: His behavior is normal. Judgment and thought content normal.            2/14/2024 5/22/2024 10/16/2024 1/29/2025   Tender  (JOSEPH-28) 18 / 28 18 / 28 20 / 28 22 / 28    Swollen (JOSEPH-28) 0 / 28  10 / 28  7 / 28  5 / 28    Provider Global 15 / 100 -- 25 / 100 40 / 100   Patient Global 80 / 100 -- 55 / 100 65 / 100   ESR -- -- 7 mm/hr --   CRP -- -- 1.6 mg/L --   JOSEPH-28 (ESR) -- -- 5.38 (High disease activity) --   JOSEPH-28 (CRP) -- -- 5.32 (High disease activity) --   CDAI Score 27.5  -- 35  37.5         Assessment/Plan:     1. Rheumatoid arthritis involving both shoulders with negative rheumatoid factor    2. Muscle weakness    3. Class 3 severe obesity with body mass index (BMI) of 40.0 to 44.9 in adult, unspecified obesity type, unspecified whether serious comorbidity present        1. Interstitial pulmonary disease, unspecified    2. Rheumatoid arthritis, involving unspecified site, unspecified whether rheumatoid factor present       Seronegative RF  Fibromyalgia  Pt here for follow up of seronegative RF as well as diffuse pain from fibromyalgia. Had been on rinvoq, sulfasalazine, HCQ and pred and doing well. Minimal synovitis on exam today.   Diagnosis/Important Hx: Has diffuse tenderness but not as much swelling on exam. WPI 6/SSS 6.  Relevant serologies: +CarP ab, negative RF/CCP  Previous Therapy: TNF, Orencia, rituxan, and Actemra, MTX/Avara has been held 2/2 liver issues  Current Therapy: Rinvoq, sulfasalazine 1500mg BID, HCQ, prednisone 10mg daily  Imaging/Procedures:   10/2024   Foot XR - showed previous foot fx, no inflammation or new fracture present  Other notes:   PLAN  - Continue rinvoq, sulfasalazine, HCQ   HCQ level today  - 5mg daily x4 weeks --> 4mg x4 weeks --> 3mg x4 weeks --> 2mg x4 weeks --> 1mg x4 weeks --> stop   Will need AM cortisol once stopped  - Recommend consideration of switching to duloxetine for fibromyalgia previously   Will discuss with PCP  - External PT referral  - Bariatric referral  - F/u in 3 mo     ILD  Has a budesonide nebulizer - doesn't use it every day but does use it on worse days. Primarily  GGO in the upper lungs - improved on last CT scan 11/2023. Lungs fairly clear with light crackles in upper lobes. Does follow with pulmonology.  - Continue to follow with advanced lung     Suspected Myositis  Pt has had some bilateral proximal muscle weakness that was getting worked up. Had a negative MRI femur done 4/2024.  Diagnosis/Important Hx:   Relevant serologies: CK mildly elevated in 300s, aldolase nl  Previous Therapy: N/a  Current Therapy: N/a  Imaging/Procedures:   11/2024   EMG - normal  4/2024   MRI femur negative  Other notes:   PLAN  - Repeat CK/aldolase (CK was more elevated than prior in 500s)    Rheumatology Health Maintenance  Vaccinations: Pneumonia, COVID, flu (high dose printed today)  Medication monitoring:    HCQ - eye exam 2/2024 was normal   Upadacitinib  Osteoporosis:    DEXA - Osteopenia 10/2023   Treatment - N/A  Cardiovascular risk:    BP -    Lipids -    Glucose -       Problem List Items Addressed This Visit          Immunology/Multi System    Rheumatoid arthritis - Primary    Relevant Orders    Hydroxychloroquine (Plaquenil), blood    Lactate Dehydrogenase     Other Visit Diagnoses       Muscle weakness        Relevant Orders    Lactate Dehydrogenase    Ambulatory Referral/Consult to Physical/Occupational Therapy    Class 3 severe obesity with body mass index (BMI) of 40.0 to 44.9 in adult, unspecified obesity type, unspecified whether serious comorbidity present        Relevant Orders    Ambulatory referral/consult to Bariatric/Obesity Medicine            This patient encounter was staffed and the plan was formulated with rheumatology attending Dr. Mcgovern.    Kyra Maciel MD  Rheumatology Fellow, PGY-4

## 2025-01-29 ENCOUNTER — TELEPHONE (OUTPATIENT)
Dept: BARIATRICS | Facility: CLINIC | Age: 55
End: 2025-01-29
Payer: MEDICARE

## 2025-01-29 ENCOUNTER — OFFICE VISIT (OUTPATIENT)
Dept: RHEUMATOLOGY | Facility: CLINIC | Age: 55
End: 2025-01-29
Payer: MEDICARE

## 2025-01-29 ENCOUNTER — LAB VISIT (OUTPATIENT)
Dept: LAB | Facility: HOSPITAL | Age: 55
End: 2025-01-29
Payer: MEDICARE

## 2025-01-29 VITALS
WEIGHT: 254.19 LBS | BODY MASS INDEX: 41.03 KG/M2 | DIASTOLIC BLOOD PRESSURE: 88 MMHG | SYSTOLIC BLOOD PRESSURE: 130 MMHG | HEART RATE: 106 BPM

## 2025-01-29 DIAGNOSIS — Z79.60 ENCOUNTER FOR MONITORING IMMUNOSUPPRESSIVE MEDICATION THERAPY CAUSING IMMUNODEFICIENCY: ICD-10-CM

## 2025-01-29 DIAGNOSIS — M06.012 RHEUMATOID ARTHRITIS INVOLVING BOTH SHOULDERS WITH NEGATIVE RHEUMATOID FACTOR: ICD-10-CM

## 2025-01-29 DIAGNOSIS — M06.011 RHEUMATOID ARTHRITIS INVOLVING BOTH SHOULDERS WITH NEGATIVE RHEUMATOID FACTOR: ICD-10-CM

## 2025-01-29 DIAGNOSIS — M62.81 MUSCLE WEAKNESS: ICD-10-CM

## 2025-01-29 DIAGNOSIS — E66.813 CLASS 3 SEVERE OBESITY WITH BODY MASS INDEX (BMI) OF 40.0 TO 44.9 IN ADULT, UNSPECIFIED OBESITY TYPE, UNSPECIFIED WHETHER SERIOUS COMORBIDITY PRESENT: ICD-10-CM

## 2025-01-29 DIAGNOSIS — M06.012 RHEUMATOID ARTHRITIS INVOLVING BOTH SHOULDERS WITH NEGATIVE RHEUMATOID FACTOR: Primary | ICD-10-CM

## 2025-01-29 DIAGNOSIS — Z51.81 ENCOUNTER FOR MONITORING IMMUNOSUPPRESSIVE MEDICATION THERAPY CAUSING IMMUNODEFICIENCY: ICD-10-CM

## 2025-01-29 DIAGNOSIS — M06.011 RHEUMATOID ARTHRITIS INVOLVING BOTH SHOULDERS WITH NEGATIVE RHEUMATOID FACTOR: Primary | ICD-10-CM

## 2025-01-29 DIAGNOSIS — D84.821 ENCOUNTER FOR MONITORING IMMUNOSUPPRESSIVE MEDICATION THERAPY CAUSING IMMUNODEFICIENCY: ICD-10-CM

## 2025-01-29 DIAGNOSIS — E66.01 CLASS 3 SEVERE OBESITY WITH BODY MASS INDEX (BMI) OF 40.0 TO 44.9 IN ADULT, UNSPECIFIED OBESITY TYPE, UNSPECIFIED WHETHER SERIOUS COMORBIDITY PRESENT: ICD-10-CM

## 2025-01-29 LAB
ALBUMIN SERPL BCP-MCNC: 4.6 G/DL (ref 3.5–5.2)
ALP SERPL-CCNC: 71 U/L (ref 40–150)
ALT SERPL W/O P-5'-P-CCNC: 24 U/L (ref 10–44)
ANION GAP SERPL CALC-SCNC: 12 MMOL/L (ref 8–16)
AST SERPL-CCNC: 26 U/L (ref 10–40)
BASOPHILS # BLD AUTO: 0.07 K/UL (ref 0–0.2)
BASOPHILS NFR BLD: 0.6 % (ref 0–1.9)
BILIRUB SERPL-MCNC: 0.8 MG/DL (ref 0.1–1)
BUN SERPL-MCNC: 14 MG/DL (ref 6–20)
CALCIUM SERPL-MCNC: 9.8 MG/DL (ref 8.7–10.5)
CHLORIDE SERPL-SCNC: 99 MMOL/L (ref 95–110)
CK SERPL-CCNC: 322 U/L (ref 20–200)
CO2 SERPL-SCNC: 28 MMOL/L (ref 23–29)
CREAT SERPL-MCNC: 0.9 MG/DL (ref 0.5–1.4)
CRP SERPL-MCNC: 2.7 MG/L (ref 0–8.2)
DIFFERENTIAL METHOD BLD: ABNORMAL
EOSINOPHIL # BLD AUTO: 0.1 K/UL (ref 0–0.5)
EOSINOPHIL NFR BLD: 1.3 % (ref 0–8)
ERYTHROCYTE [DISTWIDTH] IN BLOOD BY AUTOMATED COUNT: 12.8 % (ref 11.5–14.5)
ERYTHROCYTE [SEDIMENTATION RATE] IN BLOOD BY PHOTOMETRIC METHOD: <2 MM/HR (ref 0–23)
EST. GFR  (NO RACE VARIABLE): >60 ML/MIN/1.73 M^2
GLUCOSE SERPL-MCNC: 103 MG/DL (ref 70–110)
HCT VFR BLD AUTO: 48 % (ref 40–54)
HGB BLD-MCNC: 16.1 G/DL (ref 14–18)
IMM GRANULOCYTES # BLD AUTO: 0.03 K/UL (ref 0–0.04)
IMM GRANULOCYTES NFR BLD AUTO: 0.3 % (ref 0–0.5)
LDH SERPL L TO P-CCNC: 274 U/L (ref 110–260)
LYMPHOCYTES # BLD AUTO: 1.7 K/UL (ref 1–4.8)
LYMPHOCYTES NFR BLD: 15.8 % (ref 18–48)
MCH RBC QN AUTO: 31.4 PG (ref 27–31)
MCHC RBC AUTO-ENTMCNC: 33.5 G/DL (ref 32–36)
MCV RBC AUTO: 94 FL (ref 82–98)
MONOCYTES # BLD AUTO: 1.1 K/UL (ref 0.3–1)
MONOCYTES NFR BLD: 10.3 % (ref 4–15)
NEUTROPHILS # BLD AUTO: 7.8 K/UL (ref 1.8–7.7)
NEUTROPHILS NFR BLD: 71.7 % (ref 38–73)
NRBC BLD-RTO: 0 /100 WBC
PLATELET # BLD AUTO: 265 K/UL (ref 150–450)
PMV BLD AUTO: 11.4 FL (ref 9.2–12.9)
POTASSIUM SERPL-SCNC: 3.9 MMOL/L (ref 3.5–5.1)
PROT SERPL-MCNC: 8.1 G/DL (ref 6–8.4)
RBC # BLD AUTO: 5.12 M/UL (ref 4.6–6.2)
SODIUM SERPL-SCNC: 139 MMOL/L (ref 136–145)
WBC # BLD AUTO: 10.84 K/UL (ref 3.9–12.7)

## 2025-01-29 PROCEDURE — 99999 PR PBB SHADOW E&M-EST. PATIENT-LVL IV: CPT | Mod: PBBFAC,GC,TXP, | Performed by: STUDENT IN AN ORGANIZED HEALTH CARE EDUCATION/TRAINING PROGRAM

## 2025-01-29 PROCEDURE — 80220 DRUG ASY HYDROXYCHLOROQUINE: CPT | Mod: TXP | Performed by: STUDENT IN AN ORGANIZED HEALTH CARE EDUCATION/TRAINING PROGRAM

## 2025-01-29 PROCEDURE — 85652 RBC SED RATE AUTOMATED: CPT | Mod: TXP | Performed by: STUDENT IN AN ORGANIZED HEALTH CARE EDUCATION/TRAINING PROGRAM

## 2025-01-29 PROCEDURE — 82085 ASSAY OF ALDOLASE: CPT | Mod: TXP | Performed by: STUDENT IN AN ORGANIZED HEALTH CARE EDUCATION/TRAINING PROGRAM

## 2025-01-29 PROCEDURE — 3079F DIAST BP 80-89 MM HG: CPT | Mod: CPTII,GC,NTX,S$GLB | Performed by: STUDENT IN AN ORGANIZED HEALTH CARE EDUCATION/TRAINING PROGRAM

## 2025-01-29 PROCEDURE — 99214 OFFICE O/P EST MOD 30 MIN: CPT | Mod: GC,NTX,S$GLB, | Performed by: STUDENT IN AN ORGANIZED HEALTH CARE EDUCATION/TRAINING PROGRAM

## 2025-01-29 PROCEDURE — 3075F SYST BP GE 130 - 139MM HG: CPT | Mod: CPTII,GC,NTX,S$GLB | Performed by: STUDENT IN AN ORGANIZED HEALTH CARE EDUCATION/TRAINING PROGRAM

## 2025-01-29 PROCEDURE — 3008F BODY MASS INDEX DOCD: CPT | Mod: CPTII,GC,NTX,S$GLB | Performed by: STUDENT IN AN ORGANIZED HEALTH CARE EDUCATION/TRAINING PROGRAM

## 2025-01-29 PROCEDURE — 80053 COMPREHEN METABOLIC PANEL: CPT | Mod: TXP | Performed by: STUDENT IN AN ORGANIZED HEALTH CARE EDUCATION/TRAINING PROGRAM

## 2025-01-29 PROCEDURE — 1159F MED LIST DOCD IN RCRD: CPT | Mod: CPTII,GC,NTX,S$GLB | Performed by: STUDENT IN AN ORGANIZED HEALTH CARE EDUCATION/TRAINING PROGRAM

## 2025-01-29 PROCEDURE — 85025 COMPLETE CBC W/AUTO DIFF WBC: CPT | Mod: TXP | Performed by: STUDENT IN AN ORGANIZED HEALTH CARE EDUCATION/TRAINING PROGRAM

## 2025-01-29 PROCEDURE — 83615 LACTATE (LD) (LDH) ENZYME: CPT | Mod: TXP | Performed by: STUDENT IN AN ORGANIZED HEALTH CARE EDUCATION/TRAINING PROGRAM

## 2025-01-29 PROCEDURE — 86140 C-REACTIVE PROTEIN: CPT | Mod: TXP | Performed by: STUDENT IN AN ORGANIZED HEALTH CARE EDUCATION/TRAINING PROGRAM

## 2025-01-29 PROCEDURE — 82550 ASSAY OF CK (CPK): CPT | Mod: TXP | Performed by: STUDENT IN AN ORGANIZED HEALTH CARE EDUCATION/TRAINING PROGRAM

## 2025-01-29 RX ORDER — LEVETIRACETAM 500 MG/1
500 TABLET ORAL 2 TIMES DAILY
COMMUNITY

## 2025-01-29 RX ORDER — PREDNISONE 1 MG/1
TABLET ORAL
COMMUNITY
Start: 2024-10-16

## 2025-01-29 ASSESSMENT — ROUTINE ASSESSMENT OF PATIENT INDEX DATA (RAPID3)
MDHAQ FUNCTION SCORE: 1.5
AM STIFFNESS SCORE: 1, YES
PAIN SCORE: 8.5
PATIENT GLOBAL ASSESSMENT SCORE: 6.5
PSYCHOLOGICAL DISTRESS SCORE: 4.4
FATIGUE SCORE: 8
TOTAL RAPID3 SCORE: 6.67

## 2025-01-29 NOTE — PATIENT INSTRUCTIONS
It was nice to see you again!    We will continue your current medicines. For the prednisone: 5mg daily x4 weeks --> 4mg x4 weeks --> 3mg x4 weeks --> 2mg x4 weeks --> 1mg x4 weeks --> stop. Once you stop the steroid, we will need an 8am cortisol test (a test to check that you're tolerating being off of the steroid).     We will also try physical therapy. We also recommend seeing an obesity medicine doctor around the time of your next visit with us. We also recommend discussing switching your venlafaxine to duloxetine to try and help with your fibromyalgia pain that's contributing to your rheumatoid pain.       Kyra Maciel MD  Rheumatology Fellow, PGY-4

## 2025-01-29 NOTE — PROGRESS NOTES
I have personally reviewed the history, confirmed exam findings, and discussed assessment and plan with fellow.     Latest Reference Range & Units 05/03/23 13:10   Anti-CN1A Antibody <20 Units <20   Anti-Tejal-1 Antibody <20 Units  <20 Units <20  <20   Anti-PM/Scl Ab <20 Units  <20 Units <20  <20   Anti-SS-A 52 kD Ab, IgG <20 Units  <20 Units <20  <20   Anti-U1-RNP  Ab <20 Units  <20 Units <20  <20   EJ Negative   Negative  Negative  Negative   Fibrillarin (U3 RNP) Negative   Negative  Negative  Negative   HMGCR Antibody <20.0 CU <20.0   Ku Negative   Negative  Negative  Negative   MDA-5 (P140) (CADM-140) <20 Units  <20 Units <20  <20   MI-2 Negative   Negative  Negative  Negative   NXP-2 (P140) <20 Units  <20 Units <20  <20   OJ Negative   Negative  Negative  Negative   PL-12 Negative   Negative  Negative  Negative   PL-7 Negative   Negative  Negative  Negative   SRP Negative   Negative  Negative  Negative   Th/To Negative  Negative   TIF1 GAMMA (P155/140) <20 Units  <20 Units <20  <20   U2 snRNP Negative   Negative  Negative  Negative      Latest Reference Range & Units 10/16/24 10:55   HMGCR Antibody <20.0 CU <20.0         PFTs        FVC                           TLC       RV      DLco    11/20/24  82.3                           73.3      54.5     80.5  5/22/24    85.1                           70.9      42        81.6  11/29/23  88.9                           77.8      54.1     82.7  6/12/23    90.9                           78         50.7     92.5  2/2/23      75.4                           71.3      42.2     75.6  8/17/22    83.9                           74.9      41.1     89.2  4/14/21    86.5                           75         52.1     95          CLINICAL INTERPRETATION:  Six minute walk distance is 182.88 meters (600 feet) with somewhat heavy dyspnea.  During exercise, there was no significant desaturation while breathing room air.  Both blood pressure and heart rate remained stable with walking.   Tachycardia was present prior to exercise.  The patient reported non-pulmonary symptoms during exercise.  Significant exercise impairment is likely due to cardiovascular causes and subjective symptoms.  The patient did complete the study, walking 360 seconds of the 360 second test.  Since the previous study in May 2024, exercise capacity is unchanged.  Based upon age and body mass index, exercise capacity is less than predicted.        EXAMINATION:  CT CHEST WITHOUT CONTRAST     CLINICAL HISTORY:  Interstitial lung disease; Interstitial pulmonary disease, unspecified     TECHNIQUE:  Low dose axial images, sagittal and coronal reformations were obtained from the thoracic inlet to the lung bases. Contrast was not administered.     COMPARISON:  10/05/2023     FINDINGS:  The thyroid appears normal.  The main central airways are patent.  The esophagus appears normal.  The heart is normal in size.  No pericardial effusion.  Small mediastinal lymph nodes, not enlarged by CT criteria.  No hilar or axillary adenopathy is seen.     Evaluation of the lungs is somewhat limited secondary to respiratory motion artifact.  The previously noted ground-glass opacity in the right upper lobe is no longer seen on today's study.  No parenchymal consolidation or pleural effusions.     Hepatic cysts.     Impression:     Interval resolution of the ground glass in the right upper lobe.  No new process is seen.     Hepatic cysts.        Electronically signed by:Fabby Gordillo MD  Date:                                            12/01/2023  Time:                                           16:21           Fibroscan 2/14/24:    Findings  Median liver stiffness score:  5.8  CAP Reading: dB/m:  305     IQR/med %:  12  Interpretation  Fibrosis interpretation is based on medial liver stiffness - Kilopascal (kPa).     Fibrosis Stage:  F 0-1  Steatosis interpretation is based on controlled attenuation parameter - (dB/m).     Steatosis Grade:  S3                  Three-view left foot    History: Chronic foot pain    TECHNIQUE: AP oblique and lateral left foot    FINDINGS: There is a lucency present at the base of the fifth metatarsal. It does have sclerotic margins but it's horizontal in nature and the findings consistent with an unhealed fracture.    IMPRESSION:   Fibrous nonunion fracture base fifth metatarsal    Finalized by Perfecto Ballard MD  7/18/2023 2:40 PM  by CRS  Procedure Note     Perfecto Ballard MD - 07/18/2023  Formatting of this note might be different from the original.  Three-view left foot    History: Chronic foot pain    TECHNIQUE: AP oblique and lateral left foot    FINDINGS: There is a lucency present at the base of the fifth metatarsal. It does have sclerotic margins but it's horizontal in nature and the findings consistent with an unhealed fracture.    IMPRESSION:   Fibrous nonunion fracture base fifth metatarsal    Finalized by Perfecto Ballard MD  7/18/2023 2:40 PM  by CRS            TTE: 11/20/24:    Left Ventricle: The left ventricle is normal in size. Normal wall thickness. There is normal systolic function with a visually estimated ejection fraction of 60 - 65%. There is normal diastolic function.    Right Ventricle: Normal right ventricular cavity size. Wall thickness is normal. Systolic function is normal.    Tricuspid Valve: There is mild regurgitation.    Pulmonary Artery: The estimated pulmonary artery systolic pressure is 20 mmHg.    IVC/SVC: Normal venous pressure at 3 mmHg.     EXAMINATION:  MRI FEMUR W WO CONTRAST RIGHT; MRI FEMUR W WO CONTRAST LEFT     CLINICAL HISTORY:  myositis;  Other symptoms and signs involving the musculoskeletal system     TECHNIQUE:  Routine multisequence multiplanar MRI soft tissue bilateral thigh protocol performed with the administration of 10 cc of Gadavist IV contrast.     COMPARISON:  None     FINDINGS:  The musculature of the thigh demonstrates normal signal.  No atrophy or strain.  No abnormal  enhancement.  No fluid collections or masses.  No inguinal lymphadenopathy.  The hamstring muscle complex origin is unremarkable.  The marrow signal is within normal limits.     Impression:     No evidence of myositis in the thighs.        Electronically signed by:Hemant Danielle MD  Date:                                            04/12/2024  Time:                                           14:52           EMG/NCV 11/7/24   Interpretation:     Normal study.  There is no electrodiagnostic evidence of mononeuropathy, polyneuropathy or myopathy or lumbosacral radiculopathy.     Please correlate clinically.     Minerva Reyes MD, FRCPE, FCPS, CHCQM  Neuromuscular Consultant  [facility]   EMG?NCV 11/7/24:                        Three-view left foot    History: Chronic foot pain    TECHNIQUE: AP oblique and lateral left foot    FINDINGS: There is a lucency present at the base of the fifth metatarsal. It does have sclerotic margins but it's horizontal in nature and the findings consistent with an unhealed fracture.    IMPRESSION:   Fibrous nonunion fracture base fifth metatarsal    Finalized by Perfecto Ballard MD  7/18/2023 2:40 PM  by CRS  Procedure Note     Perfecto Ballard MD - 07/18/2023  Formatting of this note might be different from the original.  Three-view left foot    History: Chronic foot pain    TECHNIQUE: AP oblique and lateral left foot    FINDINGS: There is a lucency present at the base of the fifth metatarsal. It does have sclerotic margins but it's horizontal in nature and the findings consistent with an unhealed fracture.    IMPRESSION:   Fibrous nonunion fracture base fifth metatarsal    Finalized by Perfecto Ballard MD  7/18/2023 2:40 PM  by CRS       EXAMINATION:  XR FOOT COMPLETE 3 VIEW LEFT     CLINICAL HISTORY:  .  Pain in left foot     TECHNIQUE:  AP, lateral and oblique views of the left foot were performed.     COMPARISON:  No 07/13/2023 ne     FINDINGS:  Small avulsion fragment  "adjacent to the base of the 5th metatarsal bone noted as before.  Mild hallux valgus.  No acute fracture or dislocation.  No bone destruction identified     Impression:     See above        Electronically signed by:Aime Gregg MD  Date:                                            10/16/2024  Time:                                           14:11    Exam End: 07/18/23 11:31                        Saw Dr. Duffy and Baltazar in Cardiology 7/26/23:     #Dyspnea on exertion  RESOLVED  - due to his RA medications  - symptoms have resolved since d/c of MTX and leflunomide     #HTN:  BP in clinic today at goal  - continue home amlodipine 5mg qD and telmisartan-HCTZ 80-25mg qD     #HLD:  Last LDL 90  - continue home atorvastatin 20mg qD     #RA:  - follows with rheumatology, on rynvoq and hcq     Saw Dr. Correa in [facility] 211/20/24      Joaquín Rod is a 54 y.o. male  with a history of RA, seizures, MASLD, HTN, HLD presenting with chief complaint of focal RUL pneumonitis and sleep apnea     Problem List:  # RUL pneumonitis - resolved, PFTs stable. Restriction pattern is consistent w obesity  # obstructive sleep apnea - chronic, stable - continue cpap nightly  # obesity - chronic, worsening - recommend weight loss for multitude of reasons, but will also help with restriction pattern seen on PFTs. He will speak w/ his PCP Dr. Gamboa regarding GLP-1        Differential, diagnoses, diagnostic work up, and possible treatments were discussed with the patient. Questions were answered.     Follow up in 1 year w/ PFTs/walk, or sooner if clinical change           ASSESSMENT:       Rx:  Methotrexate 25mg sc once a week since onset but held with concern for methotrexate contribution to restrictive lung disease but CT chest negative but never resumed. However fibroscan shows 2-3 fibrosis however repeat now 0-1 fibrosis 3+ fat  Leflunomide 20mg daily stopped  "                               "  Prednisone 10mg daily has not been " able to taper     Etanercept: ineffective  Adalimumab: ineffective  Abatacept:Ineffective  Tocilizumab: minimally effective  Rituximab 1000mg IV 2/24/23 and 3/10/23, only 2 weeks improvement, then back to baseline   Jakinibs: risk: obesity   The 10-year ASCVD risk score (Lucita BOYD, et al., 2019) is: 5.1%              RA RF- ACPA-NADYA+ CarP+  TJ 0  SJ 0  pt global   ESR   CRP    DAS28 5.38 OID91-LXE 5.32  CDAI  35(all HDA but driven by  10x tender/swollen joint counts, indicating central sensitization  Methotrexate and leflunomide stopped 7/17/23 again with hepatic fibrosis on fibroscan which has  now resolved, could consider resumption of leflunomide  with close monitoring of fibroscan depending on Hepatology opinion  Hydroxychloroquine 200mg twice daily  started 7/17/23 last blood level subtherapeutic 500  Sulfasalazine-EC 1000mg twice daily started 6/12/23  Upadacitinib 15mg daily started 7/17/23  Mild stable restrictive lung disease with normal HRCT chest, saw /Sunny attributed to obesity  *Intermittently elevated CK, normal myositis autoantibodies, normal MRI thighs but  proximal muscle weakness LEs UEs but improved compared with previous visit  MAFLD with  stage 2-3 fibrosis by fibroscan now only 0-1 fibrosis and 3+ fat  followed in Hepatology [facility]  methotrexate and leflunomide stopped again 7/17/23  S/p Strongyloides Rx with ivermectin  Hyperlipidemia   LDL 87.2   5/22/24 on atorvastatin  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    Oral thrush         PLAN:        CBC, CMP, ESR, CRP, CK  aldolase LD hydroxychloroquine blood level    *Covid vaccine  *discuss with Dr. Gamboa changing venlafaxine 37.5mg to duloxetine 30mg daily and if tolerated increase to 60mg daily  Continue   sulfasalazine-EC to 1500mg twice daily   Continue hydroxychloroquine 200mg twice daily had Ophthalmology check 2/2024  Continue upadacitinib 15mg daily   decrease prednisone 4mg daily x 4 wks then 3mg daily x 4 wks then 2mgdaily x 4wks then 1mg daily and continue  Ifflares consider resumption of leflunomide starting with 10mg daily and close monitoring of liver depending on Hepatology opinion as no fibrosis on scan  No need for muscle biopsy given nl MRI thighs and EMG/NCV  *Cont f/u with local Ortho for  left 5th metatarsal base Fx non-union. [id number] karen [phone] karen Mediterranean diet  Ref to PT(ext)  Ref to Bariatric Medicine for GLP-1 agonist  RTC 3 months with CBC, CMP, ESR, CRP CK aldolase LD

## 2025-01-31 LAB — ALDOLASE SERPL-CCNC: 6 U/L (ref 1.2–7.6)

## 2025-02-04 LAB — OH-CHLOROQUINE SERPL-MCNC: 370 NG/ML

## 2025-02-06 ENCOUNTER — PATIENT MESSAGE (OUTPATIENT)
Dept: RHEUMATOLOGY | Facility: CLINIC | Age: 55
End: 2025-02-06
Payer: MEDICARE

## 2025-02-19 ENCOUNTER — PATIENT MESSAGE (OUTPATIENT)
Dept: RHEUMATOLOGY | Facility: CLINIC | Age: 55
End: 2025-02-19
Payer: MEDICARE

## 2025-02-24 ENCOUNTER — TELEPHONE (OUTPATIENT)
Dept: BARIATRICS | Facility: CLINIC | Age: 55
End: 2025-02-24
Payer: MEDICARE

## 2025-02-24 NOTE — TELEPHONE ENCOUNTER
----- Message from Thu sent at 1/29/2025  4:31 PM CST -----  Regarding: check guarantor's note to see if patient would like to move forward  check guarantor's note to see if patient would like to move forward

## 2025-04-11 RX ORDER — HYDROXYCHLOROQUINE SULFATE 200 MG/1
200 TABLET, FILM COATED ORAL 2 TIMES DAILY
Qty: 180 TABLET | Refills: 1 | Status: SHIPPED | OUTPATIENT
Start: 2025-04-11

## 2025-04-15 DIAGNOSIS — M05.711 RHEUMATOID ARTHRITIS INVOLVING BOTH SHOULDERS WITH POSITIVE RHEUMATOID FACTOR: Primary | ICD-10-CM

## 2025-04-15 DIAGNOSIS — M05.712 RHEUMATOID ARTHRITIS INVOLVING BOTH SHOULDERS WITH POSITIVE RHEUMATOID FACTOR: Primary | ICD-10-CM

## 2025-04-15 RX ORDER — UPADACITINIB 15 MG/1
15 TABLET, EXTENDED RELEASE ORAL DAILY
Qty: 30 TABLET | Refills: 11 | Status: ACTIVE | OUTPATIENT
Start: 2025-04-15

## 2025-04-15 RX ORDER — UPADACITINIB 15 MG/1
15 TABLET, EXTENDED RELEASE ORAL DAILY
Qty: 30 TABLET | Refills: 11 | Status: SHIPPED | OUTPATIENT
Start: 2025-04-15 | End: 2025-04-15

## 2025-04-15 RX ORDER — HYDROXYCHLOROQUINE SULFATE 200 MG/1
200 TABLET, FILM COATED ORAL 2 TIMES DAILY
Qty: 180 TABLET | Refills: 1 | Status: ACTIVE | OUTPATIENT
Start: 2025-04-15

## 2025-04-30 RX ORDER — SULFASALAZINE 500 MG/1
1500 TABLET, DELAYED RELEASE ORAL 2 TIMES DAILY
Qty: 540 TABLET | Refills: 1 | Status: SHIPPED | OUTPATIENT
Start: 2025-04-30

## 2025-05-07 ENCOUNTER — OFFICE VISIT (OUTPATIENT)
Dept: RHEUMATOLOGY | Facility: CLINIC | Age: 55
End: 2025-05-07
Payer: MEDICARE

## 2025-05-07 ENCOUNTER — LAB VISIT (OUTPATIENT)
Dept: LAB | Facility: HOSPITAL | Age: 55
End: 2025-05-07
Payer: MEDICARE

## 2025-05-07 VITALS
SYSTOLIC BLOOD PRESSURE: 120 MMHG | HEART RATE: 94 BPM | WEIGHT: 227.75 LBS | BODY MASS INDEX: 36.6 KG/M2 | DIASTOLIC BLOOD PRESSURE: 81 MMHG | HEIGHT: 66 IN

## 2025-05-07 DIAGNOSIS — M06.012 RHEUMATOID ARTHRITIS INVOLVING BOTH SHOULDERS WITH NEGATIVE RHEUMATOID FACTOR: ICD-10-CM

## 2025-05-07 DIAGNOSIS — M06.011 RHEUMATOID ARTHRITIS INVOLVING BOTH SHOULDERS WITH NEGATIVE RHEUMATOID FACTOR: ICD-10-CM

## 2025-05-07 DIAGNOSIS — Z79.899 HISTORY OF LONG-TERM TREATMENT WITH HIGH-RISK MEDICATION: ICD-10-CM

## 2025-05-07 DIAGNOSIS — M75.81 TENDINITIS OF RIGHT ROTATOR CUFF: ICD-10-CM

## 2025-05-07 DIAGNOSIS — Z51.81 ENCOUNTER FOR MONITORING IMMUNOSUPPRESSIVE MEDICATION THERAPY CAUSING IMMUNODEFICIENCY: ICD-10-CM

## 2025-05-07 DIAGNOSIS — D84.821 ENCOUNTER FOR MONITORING IMMUNOSUPPRESSIVE MEDICATION THERAPY CAUSING IMMUNODEFICIENCY: ICD-10-CM

## 2025-05-07 DIAGNOSIS — M05.711 RHEUMATOID ARTHRITIS INVOLVING BOTH SHOULDERS WITH POSITIVE RHEUMATOID FACTOR: ICD-10-CM

## 2025-05-07 DIAGNOSIS — J84.9 INTERSTITIAL PULMONARY DISEASE, UNSPECIFIED: ICD-10-CM

## 2025-05-07 DIAGNOSIS — Z79.60 ENCOUNTER FOR MONITORING IMMUNOSUPPRESSIVE MEDICATION THERAPY CAUSING IMMUNODEFICIENCY: ICD-10-CM

## 2025-05-07 DIAGNOSIS — Z79.899 HISTORY OF LONG-TERM TREATMENT WITH HIGH-RISK MEDICATION: Primary | ICD-10-CM

## 2025-05-07 DIAGNOSIS — M62.81 MUSCLE WEAKNESS: ICD-10-CM

## 2025-05-07 DIAGNOSIS — M05.712 RHEUMATOID ARTHRITIS INVOLVING BOTH SHOULDERS WITH POSITIVE RHEUMATOID FACTOR: ICD-10-CM

## 2025-05-07 DIAGNOSIS — R26.89 BALANCE PROBLEM: ICD-10-CM

## 2025-05-07 LAB
ABSOLUTE EOSINOPHIL (OHS): 0.03 K/UL
ABSOLUTE MONOCYTE (OHS): 0.65 K/UL (ref 0.3–1)
ABSOLUTE NEUTROPHIL COUNT (OHS): 3.76 K/UL (ref 1.8–7.7)
ALBUMIN SERPL BCP-MCNC: 5 G/DL (ref 3.5–5.2)
ALDOLASE (OHS): 6.4 U/L (ref 1.2–7.6)
ALP SERPL-CCNC: 49 UNIT/L (ref 40–150)
ALT SERPL W/O P-5'-P-CCNC: 33 UNIT/L (ref 10–44)
ANION GAP (OHS): 11 MMOL/L (ref 8–16)
AST SERPL-CCNC: 42 UNIT/L (ref 11–45)
BASOPHILS # BLD AUTO: 0.04 K/UL
BASOPHILS NFR BLD AUTO: 0.7 %
BILIRUB SERPL-MCNC: 1.1 MG/DL (ref 0.1–1)
BUN SERPL-MCNC: 8 MG/DL (ref 6–20)
CALCIUM SERPL-MCNC: 9.7 MG/DL (ref 8.7–10.5)
CHLORIDE SERPL-SCNC: 104 MMOL/L (ref 95–110)
CHOLEST SERPL-MCNC: 165 MG/DL (ref 120–199)
CHOLEST/HDLC SERPL: 2.8 {RATIO} (ref 2–5)
CK SERPL-CCNC: 593 U/L (ref 20–200)
CO2 SERPL-SCNC: 23 MMOL/L (ref 23–29)
CORTIS SERPL-MCNC: 8 UG/DL (ref 4.3–22.4)
CREAT SERPL-MCNC: 1 MG/DL (ref 0.5–1.4)
CRP SERPL-MCNC: <0.3 MG/L
EAG (OHS): 111 MG/DL (ref 68–131)
ERYTHROCYTE [DISTWIDTH] IN BLOOD BY AUTOMATED COUNT: 13.5 % (ref 11.5–14.5)
ERYTHROCYTE [SEDIMENTATION RATE] IN BLOOD BY PHOTOMETRIC METHOD: <2 MM/HR
GFR SERPLBLD CREATININE-BSD FMLA CKD-EPI: >60 ML/MIN/1.73/M2
GLUCOSE SERPL-MCNC: 99 MG/DL (ref 70–110)
HBA1C MFR BLD: 5.5 % (ref 4–5.6)
HCT VFR BLD AUTO: 46.1 % (ref 40–54)
HDLC SERPL-MCNC: 60 MG/DL (ref 40–75)
HDLC SERPL: 36.4 % (ref 20–50)
HGB BLD-MCNC: 15.4 GM/DL (ref 14–18)
IMM GRANULOCYTES # BLD AUTO: 0.01 K/UL (ref 0–0.04)
IMM GRANULOCYTES NFR BLD AUTO: 0.2 % (ref 0–0.5)
LDLC SERPL CALC-MCNC: 88.2 MG/DL (ref 63–159)
LYMPHOCYTES # BLD AUTO: 1.14 K/UL (ref 1–4.8)
MCH RBC QN AUTO: 30.2 PG (ref 27–31)
MCHC RBC AUTO-ENTMCNC: 33.4 G/DL (ref 32–36)
MCV RBC AUTO: 90 FL (ref 82–98)
NONHDLC SERPL-MCNC: 105 MG/DL
NUCLEATED RBC (/100WBC) (OHS): 0 /100 WBC
PLATELET # BLD AUTO: 182 K/UL (ref 150–450)
PMV BLD AUTO: 12.2 FL (ref 9.2–12.9)
POTASSIUM SERPL-SCNC: 5 MMOL/L (ref 3.5–5.1)
PROT SERPL-MCNC: 7.6 GM/DL (ref 6–8.4)
RBC # BLD AUTO: 5.1 M/UL (ref 4.6–6.2)
RELATIVE EOSINOPHIL (OHS): 0.5 %
RELATIVE LYMPHOCYTE (OHS): 20.2 % (ref 18–48)
RELATIVE MONOCYTE (OHS): 11.5 % (ref 4–15)
RELATIVE NEUTROPHIL (OHS): 66.9 % (ref 38–73)
SODIUM SERPL-SCNC: 138 MMOL/L (ref 136–145)
TRIGL SERPL-MCNC: 84 MG/DL (ref 30–150)
WBC # BLD AUTO: 5.63 K/UL (ref 3.9–12.7)

## 2025-05-07 PROCEDURE — 85652 RBC SED RATE AUTOMATED: CPT | Mod: TXP

## 2025-05-07 PROCEDURE — 80061 LIPID PANEL: CPT | Mod: TXP

## 2025-05-07 PROCEDURE — 80053 COMPREHEN METABOLIC PANEL: CPT | Mod: TXP

## 2025-05-07 PROCEDURE — 82085 ASSAY OF ALDOLASE: CPT | Mod: TXP

## 2025-05-07 PROCEDURE — 83036 HEMOGLOBIN GLYCOSYLATED A1C: CPT | Mod: TXP

## 2025-05-07 PROCEDURE — 82550 ASSAY OF CK (CPK): CPT | Mod: TXP

## 2025-05-07 PROCEDURE — 82533 TOTAL CORTISOL: CPT | Mod: TXP

## 2025-05-07 PROCEDURE — 99999 PR PBB SHADOW E&M-EST. PATIENT-LVL V: CPT | Mod: PBBFAC,GC,TXP, | Performed by: STUDENT IN AN ORGANIZED HEALTH CARE EDUCATION/TRAINING PROGRAM

## 2025-05-07 PROCEDURE — 82024 ASSAY OF ACTH: CPT | Mod: TXP

## 2025-05-07 PROCEDURE — 85025 COMPLETE CBC W/AUTO DIFF WBC: CPT | Mod: TXP

## 2025-05-07 PROCEDURE — 36415 COLL VENOUS BLD VENIPUNCTURE: CPT | Mod: TXP

## 2025-05-07 PROCEDURE — 86140 C-REACTIVE PROTEIN: CPT | Mod: TXP

## 2025-05-07 ASSESSMENT — ROUTINE ASSESSMENT OF PATIENT INDEX DATA (RAPID3)
TOTAL RAPID3 SCORE: 5.22
FATIGUE SCORE: 5
AM STIFFNESS SCORE: 1, YES
PSYCHOLOGICAL DISTRESS SCORE: 6.6
WHEN YOU AWAKENED IN THE MORNING OVER THE LAST WEEK, PLEASE INDICATE THE AMOUNT OF TIME IT TAKES UNTIL YOU ARE AS LIMBER AS YOU WILL BE FOR THE DAY: 1 HR
PATIENT GLOBAL ASSESSMENT SCORE: 5
MDHAQ FUNCTION SCORE: 1.7
PAIN SCORE: 5

## 2025-05-07 NOTE — PATIENT INSTRUCTIONS
It was nice to see you today!    We will get blood work today. Physical therapy will be the best next step for you - we will give you some orders - please find a place to do at least one visit to get some exercises!    We need some lab tests that are specifically done in the early morning. We will print them out for you (AM cortisol, ACTH). Please get them done close to home ([facility] facility if possible, otherwise just send us the results!)    We will see you in 4 months, drive home safely!      Kyra Maciel MD  Rheumatology Fellow, PGY-4     poor safety awareness as patient with abrupt sensation for bowel movement and attempted to sit without close proximity to toilet; therapist performed complete hygiene for patient in standing for incontinent BM/verbal cues/nonverbal cues (demo/gestures)/1 person assist

## 2025-05-07 NOTE — PROGRESS NOTES
Subjective:      Patient ID: Joaquín Rod is a 54 y.o. male w/ a hx of HTN, HLD, MASLD, seizures, RA, myositis and ILD here for follow up.    Chief Complaint: No chief complaint on file.    HPI    Off the prednisone for about 3 weeks. Feels that he's stiffer and maybe a little more achy. Doesn't think he's having swelling. Feels a little more tired than usual off the prednisone.     Insurance wouldn't cover bariatrics appointment.    No other big changes since he was here last.     They increased seizure meds. Increased keppra from 500 to 750 BID for some seizure activity. No other major changes since he was last here.    Having some stable shortness of breath. Has been out walking a lot more. Lost >20 lbs. Cut out a lot of cornbread.     Not doing yoga.    Has been having more falls - may be related to foot.       Widespread Pain Index  Note the areas which the patient has had pain over the last week:                    Shoulder-girdle, left x               Shoulder-girdle, right x                         Upper arm left x                       Upper arm right x                         Lower arm left                        Lower arm right    Hip (buttock, trochanter) left   Hip (buttock, trochanter) right                            Upper leg, left x                         Upper leg, right x                           Lower leg, left                          Lower leg, right                                      Jaw, left                                   Jaw, right                                         Chest                                   Abdomen                                Upper back                               Lower back x                                        Neck   Score will be from 0-19: 7/19 (last score 6/19)    Symptom severity score  Fatigue 2  Waking Unrefreshed  2  Cognitive Symptoms 0   0 = no problem, 1=slight or mild problem 2= moderate; considerable problems often present and/or at a  moderate level, 3 = severe, pervasive, continuous, life disturbing problem  For each of the 3 symptoms, indicate the level of severity over the past week using the Scale.  The symptom severity score is the sum of the severity of the 3 symptoms (fatigue, waking unrefreshed, and cognitive symptoms) plus the number of the following symptoms occurring during the previous 6 months:   Headaches 0  Pain or cramps in the lower abdomen 0  Depression 1  The final score is between 0 and 12 : 5/12 (last 6/12)      Review of Systems   Constitutional:  Negative for fatigue, fever and unexpected weight change.   HENT:  Negative for facial swelling.    Eyes:  Negative for visual disturbance.   Respiratory:  Negative for cough and shortness of breath.    Cardiovascular:  Negative for chest pain.   Gastrointestinal:  Negative for constipation and diarrhea.   Genitourinary:  Negative for dysuria.   Skin:  Negative for rash.   Neurological:  Negative for weakness and headaches.   Hematological:  Negative for adenopathy.   Psychiatric/Behavioral:  Negative for behavioral problems.         Objective:   There were no vitals taken for this visit.  Physical Exam   Constitutional: He is oriented to person, place, and time. No distress.   HENT:   Head: Normocephalic and atraumatic.   Cardiovascular: Normal rate, regular rhythm and normal heart sounds.   Pulmonary/Chest: Effort normal. No respiratory distress. He has rhonchi (Fine crackles upper lobes).   Abdominal: Soft.   Musculoskeletal:         General: Swelling (Indicated below) present. No tenderness. Normal range of motion.   Neurological: He is alert and oriented to person, place, and time.   Skin: Skin is warm and dry. No rash noted.   Psychiatric: His behavior is normal. Judgment and thought content normal.     There is currently no information documented on the homunculus. Go to the Rheumatology activity and complete the homunculus joint exam.       2/14/2024 5/22/2024 10/16/2024  1/29/2025   Tender (JOSEPH-28) 18 / 28 18 / 28 20 / 28 22 / 28    Swollen (JOSEPH-28) 0 / 28  10 / 28  7 / 28  5 / 28    Provider Global 15 / 100 -- 25 / 100 40 / 100   Patient Global 80 / 100 -- 55 / 100 65 / 100   ESR -- -- 7 mm/hr --   CRP -- -- 1.6 mg/L 2.7 mg/L   JOSEPH-28 (ESR) -- -- 5.38 (High disease activity) --   JOSEPH-28 (CRP) -- -- 5.32 (High disease activity) 5.59 (High disease activity)   CDAI Score 27.5  -- 35  37.5         Assessment/Plan:     No diagnosis found.      1. Interstitial pulmonary disease, unspecified    2. Rheumatoid arthritis, involving unspecified site, unspecified whether rheumatoid factor present       Seronegative RF  Fibromyalgia  Pt here for follow up of seronegative RF as well as diffuse pain from fibromyalgia. Had been on rinvoq, sulfasalazine, HCQ and pred and doing well. Minimal synovitis on exam today.   Diagnosis/Important Hx: Has diffuse tenderness but not as much swelling on exam. WPI 6/SSS 6 (same 5/2025).  Relevant serologies: +CarP ab, negative RF/CCP  Previous Therapy: TNF, Orencia, rituxan, and Actemra, MTX/Avara has been held 2/2 liver issues  Current Therapy: Rinvoq, sulfasalazine 1500mg BID, HCQ 200mg BID  Imaging/Procedures:   10/2024   Foot XR - showed previous foot fx, no inflammation or new fracture present  Other notes:   PLAN  - Continue rinvoq, sulfasalazine, HCQ   HCQ level today   Due for plaquenil eye exam  - Needs to see hepatology for another fibro scan   If nl, can consider leflunomide   Messaged hepatology  - Continue off of steroids   Given print out for AM cortisol and ACTH  - Referral to internal medicine   PCP is leaving and would like to establish here  - External PT referral  - F/u in 4 mo     ILD  Has a budesonide nebulizer - doesn't use it every day but does use it on worse days. Primarily GGO in the upper lungs - improved on last CT scan 11/2023. Lungs fairly clear with light crackles in upper lobes. Does follow with pulmonology.  - Continue to  follow with advanced [facility]   Annual follow up with Dr. Mendez  - CT chest when able     Suspected Myositis  Pt has had some bilateral proximal muscle weakness that was getting worked up. Had a negative MRI femur done 4/2024.  Diagnosis/Important Hx:   Relevant serologies: CK mildly elevated in 300s, aldolase nl  Previous Therapy: N/a  Current Therapy: N/a  Imaging/Procedures:   11/2024   EMG - normal  4/2024   MRI femur negative  Other notes:   PLAN  - Repeat CK/aldolase (CK was more elevated than prior in 500s)    Rheumatology Health Maintenance  Vaccinations: UTD  Medication monitoring:    HCQ - eye exam 2/2024 was normal   Upadacitinib  Osteoporosis:    DEXA - Osteopenia 10/2023   Treatment - N/A  Cardiovascular risk:    BP -    Lipids -    Glucose -       Problem List Items Addressed This Visit    None      This patient encounter was staffed and the plan was formulated with rheumatology attending Dr. Mcgovern.    Kyra Maciel MD  Rheumatology Fellow, PGY-4

## 2025-05-08 ENCOUNTER — RESULTS FOLLOW-UP (OUTPATIENT)
Dept: RHEUMATOLOGY | Facility: CLINIC | Age: 55
End: 2025-05-08

## 2025-05-08 ENCOUNTER — TELEPHONE (OUTPATIENT)
Dept: HEPATOLOGY | Facility: CLINIC | Age: 55
End: 2025-05-08
Payer: MEDICARE

## 2025-05-08 DIAGNOSIS — Z92.21 HISTORY OF METHOTREXATE THERAPY: ICD-10-CM

## 2025-05-08 DIAGNOSIS — I10 ESSENTIAL HYPERTENSION: ICD-10-CM

## 2025-05-08 DIAGNOSIS — K76.0 METABOLIC DYSFUNCTION-ASSOCIATED STEATOTIC LIVER DISEASE (MASLD): Primary | ICD-10-CM

## 2025-05-08 DIAGNOSIS — K74.00 LIVER FIBROSIS: ICD-10-CM

## 2025-05-08 DIAGNOSIS — R74.01 HIGH TRANSAMINASE LEVELS: ICD-10-CM

## 2025-05-08 DIAGNOSIS — E80.6 BILIRUBINEMIA: ICD-10-CM

## 2025-05-08 DIAGNOSIS — E78.5 DYSLIPIDEMIA: ICD-10-CM

## 2025-05-08 DIAGNOSIS — M05.711 RHEUMATOID ARTHRITIS INVOLVING BOTH SHOULDERS WITH POSITIVE RHEUMATOID FACTOR: ICD-10-CM

## 2025-05-08 DIAGNOSIS — E66.9 OBESITY (BMI 30-39.9): ICD-10-CM

## 2025-05-08 DIAGNOSIS — M05.712 RHEUMATOID ARTHRITIS INVOLVING BOTH SHOULDERS WITH POSITIVE RHEUMATOID FACTOR: ICD-10-CM

## 2025-05-08 NOTE — TELEPHONE ENCOUNTER
----- Message from Brittany Schofield NP sent at 5/8/2025  9:03 AM CDT -----  Regarding: FW: Fibro Scan  Good Morning - Please contact patient to schedule next available f/u visit with me with Fibroscan same day as visit. Thanks!  ----- Message -----  From: Kyra Maciel MD  Sent: 5/7/2025   3:20 PM CDT  To: Brittany Schofield NP  Subject: Fibro Scan                                       Augustus Soto!I'm Kyra with rheumatology. We were hoping you might be able to schedule with this patient for a fibro scan. We want to confirm it's still normal before we restart leflunomide.Thank you!Kyra

## 2025-05-09 LAB — ACTH (OHS): 28 PG/ML

## 2025-05-15 ENCOUNTER — HOSPITAL ENCOUNTER (OUTPATIENT)
Dept: RADIOLOGY | Facility: HOSPITAL | Age: 55
Discharge: HOME | End: 2025-05-15
Attending: STUDENT IN AN ORGANIZED HEALTH CARE EDUCATION/TRAINING PROGRAM
Payer: MEDICARE

## 2025-05-15 DIAGNOSIS — J84.9 INTERSTITIAL PULMONARY DISEASE, UNSPECIFIED: ICD-10-CM

## 2025-05-15 DIAGNOSIS — Z79.899 HISTORY OF LONG-TERM TREATMENT WITH HIGH-RISK MEDICATION: ICD-10-CM

## 2025-05-15 PROCEDURE — 71250 CT THORAX DX C-: CPT | Mod: TC,TXP

## 2025-05-20 ENCOUNTER — TELEPHONE (OUTPATIENT)
Dept: RHEUMATOLOGY | Facility: HOSPITAL | Age: 55
End: 2025-05-20
Payer: MEDICARE

## 2025-05-20 DIAGNOSIS — M79.672 LEFT FOOT PAIN: Primary | ICD-10-CM

## 2025-05-21 DIAGNOSIS — R40.4 ALTERED AWARENESS, TRANSIENT: Primary | ICD-10-CM

## 2025-06-06 ENCOUNTER — OFFICE VISIT (OUTPATIENT)
Dept: HEPATOLOGY | Facility: CLINIC | Age: 55
End: 2025-06-06
Payer: MEDICARE

## 2025-06-06 ENCOUNTER — PROCEDURE VISIT (OUTPATIENT)
Dept: HEPATOLOGY | Facility: CLINIC | Age: 55
End: 2025-06-06
Payer: MEDICARE

## 2025-06-06 ENCOUNTER — PATIENT MESSAGE (OUTPATIENT)
Dept: RHEUMATOLOGY | Facility: CLINIC | Age: 55
End: 2025-06-06
Payer: MEDICARE

## 2025-06-06 VITALS — HEIGHT: 66 IN | WEIGHT: 225 LBS | BODY MASS INDEX: 36.16 KG/M2

## 2025-06-06 DIAGNOSIS — E66.9 OBESITY (BMI 30-39.9): ICD-10-CM

## 2025-06-06 DIAGNOSIS — I10 ESSENTIAL HYPERTENSION: ICD-10-CM

## 2025-06-06 DIAGNOSIS — R74.01 HIGH TRANSAMINASE LEVELS: ICD-10-CM

## 2025-06-06 DIAGNOSIS — E78.5 DYSLIPIDEMIA: ICD-10-CM

## 2025-06-06 DIAGNOSIS — M06.9 RHEUMATOID ARTHRITIS, INVOLVING UNSPECIFIED SITE, UNSPECIFIED WHETHER RHEUMATOID FACTOR PRESENT: ICD-10-CM

## 2025-06-06 DIAGNOSIS — K74.00 LIVER FIBROSIS: ICD-10-CM

## 2025-06-06 DIAGNOSIS — M05.711 RHEUMATOID ARTHRITIS INVOLVING BOTH SHOULDERS WITH POSITIVE RHEUMATOID FACTOR: ICD-10-CM

## 2025-06-06 DIAGNOSIS — Z92.21 HISTORY OF METHOTREXATE THERAPY: ICD-10-CM

## 2025-06-06 DIAGNOSIS — E80.6 BILIRUBINEMIA: ICD-10-CM

## 2025-06-06 DIAGNOSIS — M05.712 RHEUMATOID ARTHRITIS INVOLVING BOTH SHOULDERS WITH POSITIVE RHEUMATOID FACTOR: ICD-10-CM

## 2025-06-06 DIAGNOSIS — K76.89 SIMPLE HEPATIC CYST: ICD-10-CM

## 2025-06-06 DIAGNOSIS — K76.0 METABOLIC DYSFUNCTION-ASSOCIATED STEATOTIC LIVER DISEASE (MASLD): Primary | ICD-10-CM

## 2025-06-06 DIAGNOSIS — K76.0 METABOLIC DYSFUNCTION-ASSOCIATED STEATOTIC LIVER DISEASE (MASLD): ICD-10-CM

## 2025-06-06 PROBLEM — E66.01 MORBID OBESITY: Status: RESOLVED | Noted: 2023-02-15 | Resolved: 2025-06-06

## 2025-06-06 PROCEDURE — 99999 PR PBB SHADOW E&M-EST. PATIENT-LVL III: CPT | Mod: PBBFAC,TXP,, | Performed by: NURSE PRACTITIONER

## 2025-06-06 RX ORDER — FOLIC ACID 1 MG/1
1000 TABLET ORAL DAILY
Qty: 90 TABLET | Refills: 3 | Status: SHIPPED | OUTPATIENT
Start: 2025-06-06

## 2025-06-10 ENCOUNTER — OFFICE VISIT (OUTPATIENT)
Dept: ORTHOPEDICS | Facility: CLINIC | Age: 55
End: 2025-06-10
Payer: MEDICARE

## 2025-06-10 ENCOUNTER — OFFICE VISIT (OUTPATIENT)
Dept: NEUROLOGY | Facility: CLINIC | Age: 55
End: 2025-06-10
Payer: MEDICARE

## 2025-06-10 ENCOUNTER — HOSPITAL ENCOUNTER (OUTPATIENT)
Dept: RADIOLOGY | Facility: HOSPITAL | Age: 55
Discharge: HOME | End: 2025-06-10
Attending: ORTHOPAEDIC SURGERY
Payer: MEDICARE

## 2025-06-10 VITALS
BODY MASS INDEX: 36.32 KG/M2 | WEIGHT: 225 LBS | SYSTOLIC BLOOD PRESSURE: 115 MMHG | DIASTOLIC BLOOD PRESSURE: 70 MMHG | HEART RATE: 84 BPM

## 2025-06-10 VITALS — WEIGHT: 225.06 LBS | HEIGHT: 66 IN | BODY MASS INDEX: 36.17 KG/M2

## 2025-06-10 DIAGNOSIS — R40.4 ALTERED AWARENESS, TRANSIENT: ICD-10-CM

## 2025-06-10 DIAGNOSIS — S92.355S CLOSED NONDISPLACED FRACTURE OF FIFTH METATARSAL BONE OF LEFT FOOT, SEQUELA: Primary | ICD-10-CM

## 2025-06-10 DIAGNOSIS — R56.9 SEIZURE: Primary | ICD-10-CM

## 2025-06-10 DIAGNOSIS — M19.079 INFLAMMATION OF FOOT JOINT: ICD-10-CM

## 2025-06-10 DIAGNOSIS — R52 PAIN: ICD-10-CM

## 2025-06-10 PROCEDURE — 1160F RVW MEDS BY RX/DR IN RCRD: CPT | Mod: CPTII,S$GLB,, | Performed by: ORTHOPAEDIC SURGERY

## 2025-06-10 PROCEDURE — 3074F SYST BP LT 130 MM HG: CPT | Mod: CPTII,NTX,S$GLB, | Performed by: STUDENT IN AN ORGANIZED HEALTH CARE EDUCATION/TRAINING PROGRAM

## 2025-06-10 PROCEDURE — 3008F BODY MASS INDEX DOCD: CPT | Mod: CPTII,NTX,S$GLB, | Performed by: STUDENT IN AN ORGANIZED HEALTH CARE EDUCATION/TRAINING PROGRAM

## 2025-06-10 PROCEDURE — 73630 X-RAY EXAM OF FOOT: CPT | Mod: 26,LT,, | Performed by: RADIOLOGY

## 2025-06-10 PROCEDURE — 73630 X-RAY EXAM OF FOOT: CPT | Mod: TC,LT,TXP

## 2025-06-10 PROCEDURE — 3008F BODY MASS INDEX DOCD: CPT | Mod: CPTII,S$GLB,, | Performed by: ORTHOPAEDIC SURGERY

## 2025-06-10 PROCEDURE — 99999 PR PBB SHADOW E&M-EST. PATIENT-LVL III: CPT | Mod: PBBFAC,,, | Performed by: ORTHOPAEDIC SURGERY

## 2025-06-10 PROCEDURE — 3044F HG A1C LEVEL LT 7.0%: CPT | Mod: CPTII,NTX,S$GLB, | Performed by: STUDENT IN AN ORGANIZED HEALTH CARE EDUCATION/TRAINING PROGRAM

## 2025-06-10 PROCEDURE — 3044F HG A1C LEVEL LT 7.0%: CPT | Mod: CPTII,S$GLB,, | Performed by: ORTHOPAEDIC SURGERY

## 2025-06-10 PROCEDURE — 1159F MED LIST DOCD IN RCRD: CPT | Mod: CPTII,S$GLB,, | Performed by: ORTHOPAEDIC SURGERY

## 2025-06-10 PROCEDURE — 99203 OFFICE O/P NEW LOW 30 MIN: CPT | Mod: S$GLB,,, | Performed by: ORTHOPAEDIC SURGERY

## 2025-06-10 PROCEDURE — 99417 PROLNG OP E/M EACH 15 MIN: CPT | Mod: NTX,S$GLB,, | Performed by: STUDENT IN AN ORGANIZED HEALTH CARE EDUCATION/TRAINING PROGRAM

## 2025-06-10 PROCEDURE — 99999 PR PBB SHADOW E&M-EST. PATIENT-LVL III: CPT | Mod: PBBFAC,TXP,, | Performed by: STUDENT IN AN ORGANIZED HEALTH CARE EDUCATION/TRAINING PROGRAM

## 2025-06-10 PROCEDURE — 99215 OFFICE O/P EST HI 40 MIN: CPT | Mod: NTX,S$GLB,, | Performed by: STUDENT IN AN ORGANIZED HEALTH CARE EDUCATION/TRAINING PROGRAM

## 2025-06-10 PROCEDURE — 3078F DIAST BP <80 MM HG: CPT | Mod: CPTII,NTX,S$GLB, | Performed by: STUDENT IN AN ORGANIZED HEALTH CARE EDUCATION/TRAINING PROGRAM

## 2025-06-10 RX ORDER — METHYLPREDNISOLONE 4 MG/1
TABLET ORAL
Qty: 21 EACH | Refills: 0 | Status: SHIPPED | OUTPATIENT
Start: 2025-06-10

## 2025-06-10 NOTE — PROGRESS NOTES
Subjective:      Patient ID: Joaquín Rod is a 54 y.o. male.    Chief Complaint: Pain of the Left Foot      HPI:   History of Present Illness    HPI:  Patient presents with left foot pain persisting for approximately two years. He localizes pain to the lateral aspect of his left foot, describing it as present on the lateral side and under the bottom. Pain is exacerbated by weight-bearing activities and walking, but he denies pain when not weight-bearing.    An XR Left Foot taken two years ago revealed a fifth metatarsal break, as diagnosed by Dr. Mcgovern. Dr. Camacho Adams in [place], [place] initially recommended wearing a boot for eight weeks. He complied, wearing the boot for the full eight weeks and even using a cast. However, when he transitions back to regular shoes, the pain recurs. A follow-up visit with Dr. Adams revealed that the bone was still out of place and unlikely to fuse back together He recently resumed wearing the boot due to increased pain.    He denies any specific injury that precipitated the foot pain. He also denies twisting his ankle or any other traumatic event. His foot rotates inward when he walks, demonstrating an inward rotation during gait. He uses a walker, but this is not related to his foot pain.    He has a history of rheumatoid arthritis and mild arthritis, for which he sees Dr. Mcgovern. He denies pain while sitting or lying in bed at night, and any history of joint replacements.    PREVIOUS TREATMENTS:  - Patient was instructed to wear a boot for 8 weeks about a year ago, which provided temporary relief but symptoms returned when switching back to regular shoes.  - Patient has used a boot intermittently over the past year when pain increases, with minimal benefit.  - Patient attempted walking 2-3 miles daily for exercise, but this exacerbated symptoms.      ROS:  Musculoskeletal: +limb pain, +muscle weakness, +pain with movement, +difficulty walking  Neurological:  "+involuntary movements          Past Medical History:   Diagnosis Date    HLD (hyperlipidemia)     HTN (hypertension)     MARTI on CPAP     Rheumatoid arthritis, unspecified      Current Medications[1]  Review of patient's allergies indicates:  No Known Allergies    Ht 5' 6" (1.676 m)   Wt 102.1 kg (225 lb 1.4 oz)   BMI 36.33 kg/m²     Objective:    Ortho Exam   Physical Exam    This is a well-developed well-nourished male who walks in with a walker and a significant antalgic gait.  He walks on the lateral aspect of his left foot and has some adductus of the left foot.  There is no significant swelling of the left foot today.  He has tenderness under the arch of the left foot as well as somewhat over the base of the 5th metatarsal.  He has guarded motion and significant stiffness of his left hindfoot and ankle compared to the right foot.  All of his tendons are functioning but there is some generalized weakness.  He has good distal pulse and normal sensation    Imaging: I ordered a standing x-ray of the left foot today.  On the standing AP view he has some metatarsus adductus.  There is a small minimally displaced avulsion of the base of the 5th metatarsal which appears to be old.  There are no significant degenerative changes.           Assessment:       1. Closed nondisplaced fracture of fifth metatarsal bone of left foot, sequela    2. Inflammation of foot joint          Plan:       Orders Placed This Encounter    X-Ray Foot Complete Left    MRI Midfoot WO Contrast LT    methylPREDNISolone (MEDROL, GAMA,) 4 mg tablet     Assessment & Plan    - Discontinue use of boot.  - Ordered XR Left Foot standing to assess alignment when weight-bearing.  - Referred to PT for left lower extremity rehabilitation following fracture.  - Follow up in 6 weeks.        Recommendation:  The etiology of his pain is unclear to me.  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  He walks with a cavus type gait although on the standing lateral view of his x-ray there is not a significant cavus deformity of the foot.  I would like to obtain an MRI scan to see if there is any increased activity around the base of the 5th metatarsal as well as to rule out any other structural abnormalities in the midfoot.  I recommend that he not use the boot as I believe this is probably causing some weakness and dysfunction of his left lower extremity.  I did dispense to him a postop shoe.  I also ordered a Medrol Dosepak because of his recent flare-up of pain.    I will call him with the results of the MRI          This note was generated with the assistance of ambient listening technology. Verbal consent was obtained by the patient and accompanying visitor(s) for the recording of patient appointment to facilitate this note. I attest to having reviewed and edited the generated note for accuracy, though some syntax or spelling errors may persist. Please contact the author of this note for any clarification.               [1]   Current Outpatient Medications:     amLODIPine (NORVASC) 10 MG tablet, amlodipine 10 mg tablet  TAKE 1 TABLET BY MOUTH EVERY DAY, Disp: , Rfl:     atorvastatin (LIPITOR) 20 MG tablet, atorvastatin 20 mg tablet  TAKE 1 TABLET BY MOUTH EVERY DAY, Disp: , Rfl:     brivaracetam (BRIVIACT) 75 mg Tab, Take 1 tablet (75 mg total) by mouth 2 (two) times daily., Disp: 180 tablet, Rfl: 1    budesonide 1 mg/2 mL NbSp, budesonide 1 mg/2 mL suspension for nebulization  INHALE 2 ML TWICE A DAY BY NEBULIZATION ROUTE AS NEEDED., Disp: , Rfl:     busPIRone (BUSPAR) 10 MG tablet, Take 10 mg by mouth 2 (two) times daily., Disp: , Rfl:     fluticasone propionate (FLONASE) 50 mcg/actuation nasal spray, fluticasone propionate 50 mcg/actuation nasal spray,suspension  SPRAY 2 SPRAYS INTO EACH NOSTRIL EVERY DAY, Disp: , Rfl:     folic acid (FOLVITE) 1 MG tablet,  Take 1 tablet (1,000 mcg total) by mouth once daily., Disp: 90 tablet, Rfl: 3    hydroxychloroquine (PLAQUENIL) 200 mg tablet, Take 1 tablet (200 mg total) by mouth 2 (two) times daily., Disp: 180 tablet, Rfl: 1    sulfaSALAzine (AZULFIDINE ENTABS) 500 MG EC tablet, Take 3 tablets (1,500 mg total) by mouth 2 (two) times a day., Disp: 540 tablet, Rfl: 1    telmisartan-hydrochlorothiazide (MICARDIS HCT) 80-25 mg per tablet, telmisartan 80 mg-hydrochlorothiazide 25 mg tablet  TAKE 1 TABLET BY MOUTH EVERY DAY, Disp: , Rfl:     upadacitinib (RINVOQ) 15 mg 24 hr tablet, Take 1 tablet (15 mg total) by mouth once daily., Disp: 30 tablet, Rfl: 11    venlafaxine (EFFEXOR) 37.5 MG Tab, Take 37.5 mg by mouth once daily., Disp: , Rfl:     methylPREDNISolone (MEDROL, GAMA,) 4 mg tablet, Take as instructed on package, Disp: 21 each, Rfl: 0

## 2025-06-10 NOTE — PROGRESS NOTES
Chief Complaint and Duration     Chief Complaint   Patient presents with    Consult     Referred by   LORENE JOHNSON    Altered awareness,seizure       History of Present Illness     Joaquín Rod is a 54 y.o. male, new to me.    Focal motor seizure, started in 2024. Started on keppra 500mg BID --> 750mg BID, increased anxiety.   Trigger is music --> raises arm into air and seems to lose awareness and perception.  Has had MRI brain and EEG routine unremarkable for seizure nidus. Did have report of normal video eeg.    He is on disability for rheumatoid arthritis, which sounds severe, associated myositis, and antisynthetase syndrome. He sees [facility] for all of those issues.    Review of patient's allergies indicates:  No Known Allergies  Current Medications[1]    Medical History     Past Medical History:   Diagnosis Date    HLD (hyperlipidemia)     HTN (hypertension)     MARTI on CPAP     Rheumatoid arthritis, unspecified      Past Surgical History:   Procedure Laterality Date    REPAIR, HERNIA, INGUINAL Right 2002     No family history on file.  Social History[2]    Exam     Vitals:    06/10/25 0749   BP: 115/70   Pulse: 84      Physical Exam:  General: Not in acute distress. Not ill-appearing.   HENT: Normocephalic and atraumatic. Moist mucous membranes.  Eyes: Conjunctivae normal.   Pulmonary: Pulmonary effort is normal.   Skin: Skin is warm and dry. No rashes.   Psychiatric: Mood normal.        Neurologic Exam   Mental status: oriented to person, place, and time  Attention: Normal. Concentration: normal.  Speech: speech is normal.  Cranial Nerves: EOMI intact,  Symmetric facies. Hearing grossly intact. Palate and uvula midline, symmetric. No tongue deviation. Trapezius strength intact.     Patient's hand covered w gloves    Labs and Imaging     Labs: reviewed  No results found for this or any previous visit (from the past 24 hours).    Thyroid normal  HgA1C%:  5.5  LDL:  88    Imaging:   I have  personally reviewed the images performed.   Mri brain external 2024 - no acute processes    Assessment and Plan     Problem List Items Addressed This Visit          Neuro    Seizure - Primary    Relevant Medications    brivaracetam (BRIVIACT) 75 mg Tab     Other Visit Diagnoses         Altered awareness, transient        Relevant Medications    brivaracetam (BRIVIACT) 75 mg Tab          Patient w focal motor seizure - tried keppra 500mg BID --> 750mg BID, increased anxiety.  Will transition to briviact 750mg BID, followup w me in 2 months coinciding w his other appts (from [place]).      Patient was instructed about the following seizure precautions:   - Take all medications as prescribed by your doctor. Specifically take your anti-epileptic drugs at timed intervals that are on the prescription label.  - Do not drive or operate heavy machinery for a state-law specific period of time from seizure activity, 6 months from last seizure  - If you ride a bicycle or any other manually propelled device, please use a helmet  - Never swim alone or without close supervision  - Use showers only; do not take a bath or put yourself in a situation where loss of responsiveness could lead to drowning  - Only cook under supervision. Use the back burners only.  - Do not hold a child or carry an infant. If breastfeeding, only perform this in a seated position with cushioning.   - Do not engage in any activity that in the event of a seizure or loss of responsiveness could lead to any type of bodily injury to yourself or others        Discussed w patient multifactorial nature of symptoms.     Questions answered w patient. Appreciate opportunity to care for this patient.    Extremely pleasant, him and his wife.     Follow-up: 2 months    Time spent on this encounter: 70 minutes. This includes face to face time and non-face to face time preparing to see the patient (eg, review of tests), obtaining and/or reviewing separately obtained history,  documenting clinical information in the electronic or other health record, independently interpreting results and communicating results to the patient/family/caregiver, or care coordinator. Patient's complex condition requires monitoring.  Chart thoroughly reviewed.    This note was created by combination of typed  and M-Modal dictation. Transcription and phonetic errors may be present.  If there are any questions, please contact me.         [1]   Current Outpatient Medications   Medication Sig Dispense Refill    amLODIPine (NORVASC) 10 MG tablet amlodipine 10 mg tablet   TAKE 1 TABLET BY MOUTH EVERY DAY      atorvastatin (LIPITOR) 20 MG tablet atorvastatin 20 mg tablet   TAKE 1 TABLET BY MOUTH EVERY DAY      brivaracetam (BRIVIACT) 75 mg Tab Take 1 tablet (75 mg total) by mouth 2 (two) times daily. 180 tablet 1    budesonide 1 mg/2 mL NbSp budesonide 1 mg/2 mL suspension for nebulization   INHALE 2 ML TWICE A DAY BY NEBULIZATION ROUTE AS NEEDED.      busPIRone (BUSPAR) 10 MG tablet Take 10 mg by mouth 2 (two) times daily.      fluticasone propionate (FLONASE) 50 mcg/actuation nasal spray fluticasone propionate 50 mcg/actuation nasal spray,suspension   SPRAY 2 SPRAYS INTO EACH NOSTRIL EVERY DAY      folic acid (FOLVITE) 1 MG tablet Take 1 tablet (1,000 mcg total) by mouth once daily. 90 tablet 3    hydroxychloroquine (PLAQUENIL) 200 mg tablet Take 1 tablet (200 mg total) by mouth 2 (two) times daily. 180 tablet 1    sulfaSALAzine (AZULFIDINE ENTABS) 500 MG EC tablet Take 3 tablets (1,500 mg total) by mouth 2 (two) times a day. 540 tablet 1    telmisartan-hydrochlorothiazide (MICARDIS HCT) 80-25 mg per tablet telmisartan 80 mg-hydrochlorothiazide 25 mg tablet   TAKE 1 TABLET BY MOUTH EVERY DAY      upadacitinib (RINVOQ) 15 mg 24 hr tablet Take 1 tablet (15 mg total) by mouth once daily. 30 tablet 11    venlafaxine (EFFEXOR) 37.5 MG Tab Take 37.5 mg by mouth once daily.       No current facility-administered  medications for this visit.   [2]   Social History  Socioeconomic History    Marital status:    Tobacco Use    Smoking status: Never     Passive exposure: Never    Smokeless tobacco: Current     Types: Chew    Tobacco comments:     Chew - sometimes - not as much as he used to   Substance and Sexual Activity    Alcohol use: Never    Drug use: Never    Sexual activity: Yes     Partners: Female

## 2025-06-12 DIAGNOSIS — R40.4 ALTERED AWARENESS, TRANSIENT: Primary | ICD-10-CM

## 2025-06-16 ENCOUNTER — HOSPITAL ENCOUNTER (OUTPATIENT)
Dept: RADIOLOGY | Facility: HOSPITAL | Age: 55
Discharge: HOME | End: 2025-06-16
Attending: ORTHOPAEDIC SURGERY
Payer: MEDICARE

## 2025-06-16 DIAGNOSIS — S92.355S CLOSED NONDISPLACED FRACTURE OF FIFTH METATARSAL BONE OF LEFT FOOT, SEQUELA: ICD-10-CM

## 2025-06-16 DIAGNOSIS — M19.079 INFLAMMATION OF FOOT JOINT: ICD-10-CM

## 2025-06-16 PROCEDURE — 73718 MRI LOWER EXTREMITY W/O DYE: CPT | Mod: TC,PO,LT

## 2025-06-17 ENCOUNTER — OFFICE VISIT (OUTPATIENT)
Dept: ORTHOPEDICS | Facility: CLINIC | Age: 55
End: 2025-06-17
Payer: MEDICARE

## 2025-06-17 DIAGNOSIS — S92.355S CLOSED NONDISPLACED FRACTURE OF FIFTH METATARSAL BONE OF LEFT FOOT, SEQUELA: Primary | ICD-10-CM

## 2025-06-17 DIAGNOSIS — Q66.71 PES CAVUS OF BOTH FEET: ICD-10-CM

## 2025-06-17 DIAGNOSIS — Q66.72 PES CAVUS OF BOTH FEET: ICD-10-CM

## 2025-06-17 DIAGNOSIS — M19.072 ARTHRITIS OF LEFT MIDFOOT: ICD-10-CM

## 2025-06-17 NOTE — PROGRESS NOTES
Audio Only Telehealth Visit     The patient location is:  [place]  The chief complaint leading to consultation is:  MRI results  Visit type: Virtual visit with audio only (telephone)  Total time spent in medical discussion with patient:  Five minutes  Total time spent on date of the encounter:  Five minutes       The reason for the audio only service rather than synchronous audio and video virtual visit was related to technical difficulties or patient preference/necessity.       Each patient to whom I provide medical services by telemedicine is:  (1) informed of the relationship between the physician and patient and the respective role of any other health care provider with respect to management of the patient; and (2) notified that they may decline to receive medical services by telemedicine and may withdraw from such care at any time. Patient verbally consented to receive this service via voice-only telephone call.       HPI:  This is a 54-year-old male who presented to me on 06/10/2025 for chronic left foot pain that has been going on for about the past two years.  He reported pain on the lateral aspect and under the bottom of his foot.  He had an x-ray a couple of years ago and had his work which revealed a 5th metatarsal fracture and he was treated in a boot for eight weeks as well as a cast.  He was told that he had bone that was not healing.  X-rays did reveal a small avulsion fragment of bone at the base of the 5th metatarsal which I would not expect to be a significant cause of pain.  I ordered an MRI scan for further evaluation.      MRI results: MRI of the left midfoot performed on 06/16/2025 reveals the avulsion fragment of the base of the 5th metatarsal but no significant bone marrow edema of the bone or the surrounding soft tissues to suggest any ongoing inflammation.  The only other noted findings were some mild arthritis of the midfoot.  No soft tissue abnormalities were noted in the arch of the  foot.     Assessment and plan:    1. Closed nondisplaced fracture of fifth metatarsal bone of left foot, sequela  HME - OTHER      2. Arthritis of left midfoot  HME - OTHER      3. Pes cavus of both feet  HME - OTHER        Recommendation:  Results of the MRI were explained to Mr. Rod.  I explained to him that I do not think the avulsion fragment is the source of all his pain.  He does have some arthritis of the midfoot in walks with a cavus type gait with lateral overload.  I suggested trying to get some orthotics with lateral heel wedging to try to unload the lateral foot but otherwise I really do not have any other recommendations.                        This service was not originating from a related E/M service provided within the previous 7 days nor will  to an E/M service or procedure within the next 24 hours or my soonest available appointment.  Prevailing standard of care was able to be met in this audio-only visit.

## 2025-07-31 ENCOUNTER — TELEPHONE (OUTPATIENT)
Dept: INTERNAL MEDICINE | Facility: CLINIC | Age: 55
End: 2025-07-31
Payer: MEDICARE

## 2025-07-31 NOTE — TELEPHONE ENCOUNTER
Conf appt with patient's wife    Copied from CRM #[id number]. Topic: General Inquiry - Return Call  >> Jul 31, 2025  3:26 PM Cyndi wrote:  Type:  Patient Returning Call    Who Called:patients wife  Who Left Message for Patient:n/a  Does the patient know what this is regarding?:does not know  Would the patient rather a call back or a response via [employer]? call  Best Call Back Number:  Additional Information:

## 2025-08-01 ENCOUNTER — OFFICE VISIT (OUTPATIENT)
Dept: INTERNAL MEDICINE | Facility: CLINIC | Age: 55
End: 2025-08-01
Attending: FAMILY MEDICINE
Payer: MEDICARE

## 2025-08-01 VITALS
HEART RATE: 85 BPM | SYSTOLIC BLOOD PRESSURE: 114 MMHG | WEIGHT: 217.13 LBS | OXYGEN SATURATION: 98 % | HEIGHT: 66 IN | BODY MASS INDEX: 34.9 KG/M2 | DIASTOLIC BLOOD PRESSURE: 82 MMHG

## 2025-08-01 DIAGNOSIS — Z12.11 COLON CANCER SCREENING: ICD-10-CM

## 2025-08-01 DIAGNOSIS — M60.9 MYOSITIS, UNSPECIFIED MYOSITIS TYPE, UNSPECIFIED SITE: ICD-10-CM

## 2025-08-01 DIAGNOSIS — J84.9 INTERSTITIAL PULMONARY DISEASE, UNSPECIFIED: ICD-10-CM

## 2025-08-01 DIAGNOSIS — E66.9 OBESITY (BMI 30-39.9): ICD-10-CM

## 2025-08-01 DIAGNOSIS — Z12.5 PROSTATE CANCER SCREENING: ICD-10-CM

## 2025-08-01 DIAGNOSIS — G47.33 OBSTRUCTIVE SLEEP APNEA SYNDROME: ICD-10-CM

## 2025-08-01 DIAGNOSIS — I10 HYPERTENSION, ESSENTIAL: ICD-10-CM

## 2025-08-01 DIAGNOSIS — F41.9 ANXIETY: ICD-10-CM

## 2025-08-01 DIAGNOSIS — M06.4 UNDIFFERENTIATED INFLAMMATORY POLYARTHRITIS: ICD-10-CM

## 2025-08-01 DIAGNOSIS — E78.5 DYSLIPIDEMIA: ICD-10-CM

## 2025-08-01 DIAGNOSIS — J98.4 RESTRICTIVE LUNG DISEASE: ICD-10-CM

## 2025-08-01 DIAGNOSIS — K76.89 SIMPLE HEPATIC CYST: ICD-10-CM

## 2025-08-01 DIAGNOSIS — R56.9 SEIZURE: ICD-10-CM

## 2025-08-01 DIAGNOSIS — M05.712 RHEUMATOID ARTHRITIS INVOLVING BOTH SHOULDERS WITH POSITIVE RHEUMATOID FACTOR: Primary | ICD-10-CM

## 2025-08-01 DIAGNOSIS — M05.711 RHEUMATOID ARTHRITIS INVOLVING BOTH SHOULDERS WITH POSITIVE RHEUMATOID FACTOR: Primary | ICD-10-CM

## 2025-08-01 DIAGNOSIS — L98.9 SKIN LESION: ICD-10-CM

## 2025-08-01 DIAGNOSIS — K76.0 METABOLIC DYSFUNCTION-ASSOCIATED STEATOTIC LIVER DISEASE (MASLD): ICD-10-CM

## 2025-08-01 PROBLEM — S92.355D NONDISPLACED FRACTURE OF FIFTH LEFT METATARSAL BONE WITH ROUTINE HEALING: Status: RESOLVED | Noted: 2023-08-02 | Resolved: 2025-08-01

## 2025-08-01 PROBLEM — R74.01 HIGH TRANSAMINASE LEVELS: Status: RESOLVED | Noted: 2023-02-15 | Resolved: 2025-08-01

## 2025-08-01 PROBLEM — R73.01 IMPAIRED FASTING GLUCOSE: Status: RESOLVED | Noted: 2020-01-06 | Resolved: 2025-08-01

## 2025-08-01 PROBLEM — G25.81 RESTLESS LEGS: Status: RESOLVED | Noted: 2024-05-24 | Resolved: 2025-08-01

## 2025-08-01 PROBLEM — R06.02 SHORTNESS OF BREATH: Status: RESOLVED | Noted: 2022-08-17 | Resolved: 2025-08-01

## 2025-08-01 PROBLEM — M79.642 PAIN OF LEFT HAND: Status: RESOLVED | Noted: 2023-10-15 | Resolved: 2025-08-01

## 2025-08-01 PROBLEM — R07.9 CHEST PAIN: Status: RESOLVED | Noted: 2024-05-24 | Resolved: 2025-08-01

## 2025-08-01 PROBLEM — M79.672 LEFT FOOT PAIN: Status: RESOLVED | Noted: 2023-12-08 | Resolved: 2025-08-01

## 2025-08-01 PROBLEM — M13.0 POLYARTHRITIS: Status: RESOLVED | Noted: 2021-06-11 | Resolved: 2025-08-01

## 2025-08-01 PROBLEM — R50.9 FEVER: Status: RESOLVED | Noted: 2024-05-24 | Resolved: 2025-08-01

## 2025-08-01 PROBLEM — U07.1 COVID-19: Status: RESOLVED | Noted: 2022-01-21 | Resolved: 2025-08-01

## 2025-08-01 PROCEDURE — 99999 PR PBB SHADOW E&M-EST. PATIENT-LVL IV: CPT | Mod: PBBFAC,,, | Performed by: FAMILY MEDICINE

## 2025-08-01 RX ORDER — TELMISARTAN AND HYDROCHLOROTHIAZIDE 25; 80 MG/1; MG/1
1 TABLET ORAL DAILY
Qty: 90 TABLET | Refills: 0 | Status: SHIPPED | OUTPATIENT
Start: 2025-08-01

## 2025-08-01 RX ORDER — ATORVASTATIN CALCIUM 20 MG/1
20 TABLET, FILM COATED ORAL DAILY
Qty: 90 TABLET | Refills: 0 | Status: SHIPPED | OUTPATIENT
Start: 2025-08-01

## 2025-08-01 RX ORDER — AMLODIPINE BESYLATE 10 MG/1
10 TABLET ORAL DAILY
Qty: 90 TABLET | Refills: 0 | Status: SHIPPED | OUTPATIENT
Start: 2025-08-01

## 2025-08-01 RX ORDER — BUSPIRONE HYDROCHLORIDE 10 MG/1
10 TABLET ORAL 2 TIMES DAILY
Qty: 180 TABLET | Refills: 0 | Status: SHIPPED | OUTPATIENT
Start: 2025-08-01

## 2025-08-01 NOTE — ASSESSMENT & PLAN NOTE
-rheumatoid arthritis managed in rheumatology  -followed in pulmonary (transplant)  -budesonide neb's

## 2025-08-01 NOTE — ASSESSMENT & PLAN NOTE
Patient reported significant intentional weight loss, dietary efforts, improvement of fatty liver.

## 2025-08-01 NOTE — ASSESSMENT & PLAN NOTE
-seronegative by diagnosis here, but conflicting diagnoses in problem list.  -neg 8/24/2020, 2/26/2021

## 2025-08-01 NOTE — PROGRESS NOTES
Subjective:       Patient ID: Joaquín Rod is a 54 y.o. male.    Chief Complaint: Establish Care    New patient to establish care. 54 y.o. male w/ a hx of HTN, HLD, MASLD, seizures, RA, myositis and ILD.       Past, Surgical, Family, Social Histories; Medications, Allergies reviewed and reconciled.  Health maintenance file reviewed and addressed items due. Recent applicable lab, imaging and cardiovascular results reviewed.  Problem list items reviewed and modified or added entries (in the overview section) may not be transcribed into this encounter note due to note writer format.    Was referred by Rheumatology concerning getting a new PCP.  He lives in [facility], receives specialty care here, local PCP had left.  No immediate needs today.  Extensive chart review and problem list update.  Medication reconciliation.  Needs refills of hypertensive and hyperlipidemic medication.  Stable anxiety buspirone.    Arthralgias throughout.  Noted some skin lesions on the extensor forearms.  Two or 3 scattered small papules were present.  No cardiopulmonary complaints.  No GI complaints today.  Chronic shortness a breath, followed in Pulmonary periodically.        Review of Systems   Constitutional:  Negative for appetite change, chills, diaphoresis, fatigue and fever.   HENT:  Negative for congestion, postnasal drip, rhinorrhea, sore throat and trouble swallowing.    Eyes:  Negative for visual disturbance.   Respiratory:  Positive for shortness of breath. Negative for cough, choking, chest tightness and wheezing.    Cardiovascular:  Negative for chest pain and leg swelling.   Gastrointestinal:  Negative for abdominal distention, abdominal pain, diarrhea, nausea and vomiting.   Genitourinary:  Negative for difficulty urinating and hematuria.   Musculoskeletal:  Positive for arthralgias and myalgias.   Skin:  Negative for rash.   Neurological:  Negative for weakness, light-headedness and headaches.    Psychiatric/Behavioral:  Positive for dysphoric mood. Negative for confusion.        Objective:      Physical Exam  Vitals and nursing note reviewed.   Constitutional:       Appearance: He is well-developed. He is not diaphoretic.   HENT:      Head: Normocephalic and atraumatic.   Eyes:      General: No scleral icterus.     Conjunctiva/sclera: Conjunctivae normal.   Cardiovascular:      Rate and Rhythm: Normal rate and regular rhythm.      Heart sounds: Normal heart sounds. No murmur heard.     No friction rub. No gallop.   Pulmonary:      Effort: Pulmonary effort is normal. No respiratory distress.      Breath sounds: Normal breath sounds. No wheezing or rales.   Abdominal:      General: There is no distension.      Tenderness: There is no abdominal tenderness.   Musculoskeletal:         General: No deformity.      Cervical back: Normal range of motion and neck supple.   Skin:     General: Skin is warm and dry.      Findings: No erythema or rash.   Neurological:      Mental Status: He is alert and oriented to person, place, and time.      Cranial Nerves: No cranial nerve deficit.      Motor: No tremor.      Coordination: Coordination normal.      Gait: Gait normal.   Psychiatric:         Behavior: Behavior normal.         Thought Content: Thought content normal.         Judgment: Judgment normal.         Assessment:       1. Rheumatoid arthritis involving both shoulders with positive rheumatoid factor    2. Undifferentiated inflammatory polyarthritis    3. Myositis, unspecified myositis type, unspecified site    4. Interstitial pulmonary disease, unspecified    5. Restrictive lung disease    6. Seizure    7. Hypertension, essential    8. Dyslipidemia    9. Metabolic dysfunction-associated steatotic liver disease (MASLD)    10. Simple hepatic cyst    11. Obesity (BMI 30-39.9)    12. Obstructive sleep apnea syndrome    13. Prostate cancer screening    14. Colon cancer screening    15. Skin lesion    16. Anxiety         Plan:     Medication List with Changes/Refills   Current Medications    BRIVARACETAM (BRIVIACT) 75 MG TAB    Take 1 tablet (75 mg total) by mouth 2 (two) times daily.    BUDESONIDE 1 MG/2 ML NBS    budesonide 1 mg/2 mL suspension for nebulization   INHALE 2 ML TWICE A DAY BY NEBULIZATION ROUTE AS NEEDED.    FLUTICASONE PROPIONATE (FLONASE) 50 MCG/ACTUATION NASAL SPRAY    fluticasone propionate 50 mcg/actuation nasal spray,suspension   SPRAY 2 SPRAYS INTO EACH NOSTRIL EVERY DAY    FOLIC ACID (FOLVITE) 1 MG TABLET    Take 1 tablet (1,000 mcg total) by mouth once daily.    HYDROXYCHLOROQUINE (PLAQUENIL) 200 MG TABLET    Take 1 tablet (200 mg total) by mouth 2 (two) times daily.    SULFASALAZINE (AZULFIDINE ENTABS) 500 MG EC TABLET    Take 3 tablets (1,500 mg total) by mouth 2 (two) times a day.    UPADACITINIB (RINVOQ) 15 MG 24 HR TABLET    Take 1 tablet (15 mg total) by mouth once daily.   Changed and/or Refilled Medications    Modified Medication Previous Medication    AMLODIPINE (NORVASC) 10 MG TABLET amLODIPine (NORVASC) 10 MG tablet       Take 1 tablet (10 mg total) by mouth once daily.    amlodipine 10 mg tablet   TAKE 1 TABLET BY MOUTH EVERY DAY    ATORVASTATIN (LIPITOR) 20 MG TABLET atorvastatin (LIPITOR) 20 MG tablet       Take 1 tablet (20 mg total) by mouth once daily.    atorvastatin 20 mg tablet   TAKE 1 TABLET BY MOUTH EVERY DAY    BUSPIRONE (BUSPAR) 10 MG TABLET busPIRone (BUSPAR) 10 MG tablet       Take 1 tablet (10 mg total) by mouth 2 (two) times daily.    Take 10 mg by mouth 2 (two) times daily.    TELMISARTAN-HYDROCHLOROTHIAZIDE (MICARDIS HCT) 80-25 MG PER TABLET telmisartan-hydrochlorothiazide (MICARDIS HCT) 80-25 mg per tablet       Take 1 tablet by mouth once daily.    telmisartan 80 mg-hydrochlorothiazide 25 mg tablet   TAKE 1 TABLET BY MOUTH EVERY DAY   Discontinued Medications    METHYLPREDNISOLONE (MEDROL, GAMA,) 4 MG TABLET    Take as instructed on package    VENLAFAXINE (EFFEXOR) 37.5 MG  TAB    Take 37.5 mg by mouth once daily.     1. Rheumatoid arthritis involving both shoulders with positive rheumatoid factor  Overview:  -followed in rheumatology    Assessment & Plan:   -seronegative by diagnosis here, but conflicting diagnoses in problem list.  -neg 8/24/2020, 2/26/2021    Orders:  -     Ambulatory referral/consult to Internal Medicine    2. Undifferentiated inflammatory polyarthritis  Overview:  -see RA, followed by rheumatology      3. Myositis, unspecified myositis type, unspecified site  Overview:  -followed in rhematolgy      4. Interstitial pulmonary disease, unspecified  -     Ambulatory referral/consult to Internal Medicine    5. Restrictive lung disease  Overview:  PFTs        FVC     FEV1/FVC     TLC       RV      DLco     11/20/24  82.3       87                 73.3      54.5      805.  5/22/24    85.1       87                 70.9      42        81.6  11/29/23  88.9       86                 77.8      54.1     82.7  6/12/23    90.9       88                 78         50.7     92.5  2/2/23      75.4       85                 71.3      42.2     75.6  8/17/22    83.9       88                 74.9      41.1     89.2  4/14/21    86.5       86                 75         52.1     95     Assessment & Plan:  -rheumatoid arthritis managed in rheumatology  -followed in pulmonary (transplant)  -budesonide neb's      6. Seizure  Overview:  -circa 2024  -followed by neruology    Assessment & Plan:  -states briviact is helpful      7. Hypertension, essential  Assessment & Plan:  Medications per prior PCP    Controlled  Continue  Amlodipine  Telmisartan HCT    Orders:  -     amLODIPine (NORVASC) 10 MG tablet; Take 1 tablet (10 mg total) by mouth once daily.  Dispense: 90 tablet; Refill: 0  -     telmisartan-hydrochlorothiazide (MICARDIS HCT) 80-25 mg per tablet; Take 1 tablet by mouth once daily.  Dispense: 90 tablet; Refill: 0    8. Dyslipidemia  Assessment & Plan:  Medications per prior  PCP    Controlled  Continue  Atorvastatin 20 mg    Orders:  -     atorvastatin (LIPITOR) 20 MG tablet; Take 1 tablet (20 mg total) by mouth once daily.  Dispense: 90 tablet; Refill: 0    9. Metabolic dysfunction-associated steatotic liver disease (MASLD)  Overview:  -followed in hepatology (prior)    Assessment & Plan:  -improved w/ weight loss  -6/6/2025 hepatology RTC PRN, LFT's q 6-12 mo      10. Simple hepatic cyst  Overview:  -5/3/2023   -previously followed in hepatology for MASLD    Assessment & Plan:  -no f/u needed per 6/6/2025 hepatology ntoe      11. Obesity (BMI 30-39.9)  Assessment & Plan:  Patient reported significant intentional weight loss, dietary efforts, improvement of fatty liver.      12. Obstructive sleep apnea syndrome  Overview:  Wears CPAP every night      13. Prostate cancer screening  -     PSA, Screening; Future; Expected date: 08/01/2025    14. Colon cancer screening  Assessment & Plan:  States done in [place] 5 years ago       15. Skin lesion  -     Ambulatory referral/consult to Dermatology; Future; Expected date: 08/08/2025    16. Anxiety  Assessment & Plan:  Buspirone per prior PCP    Continue    Orders:  -     busPIRone (BUSPAR) 10 MG tablet; Take 1 tablet (10 mg total) by mouth 2 (two) times daily.  Dispense: 180 tablet; Refill: 0      See meds, orders, follow up, routing and instructions sections of encounter and AVS. Discussed with patient and provided on AVS.    Lab Results   Component Value Date     05/07/2025     01/29/2025    K 5.0 05/07/2025    K 3.9 01/29/2025     05/07/2025    CL 99 01/29/2025    BUN 8 05/07/2025    CREATININE 1.0 05/07/2025    GLU 99 05/07/2025     01/29/2025    HGBA1C 5.5 05/07/2025    HGBA1C 5.5 05/24/2024    MG 2.0 06/08/2022    AST 42 05/07/2025    AST 26 01/29/2025    ALT 33 05/07/2025    ALT 24 01/29/2025    ALBUMIN 5.0 05/07/2025    ALBUMIN 4.6 01/29/2025    PROT 7.6 05/07/2025    PROT 8.1 01/29/2025    BILITOT 1.1  (H) 05/07/2025    BILITOT 0.8 01/29/2025    CHOL 165 05/07/2025    CHOL 157 05/22/2024    HDL 60 05/07/2025    LDLCALC 88.2 05/07/2025    TRIG 84 05/07/2025    TRIG 114 05/22/2024    WBC 5.63 05/07/2025    HGB 15.4 05/07/2025    HGB 16.1 01/29/2025    HCT 46.1 05/07/2025    HCT 48.0 01/29/2025     05/07/2025     01/29/2025

## 2025-08-13 ENCOUNTER — OFFICE VISIT (OUTPATIENT)
Dept: NEUROLOGY | Facility: CLINIC | Age: 55
End: 2025-08-13
Payer: MEDICARE

## 2025-08-13 ENCOUNTER — OFFICE VISIT (OUTPATIENT)
Dept: RHEUMATOLOGY | Facility: CLINIC | Age: 55
End: 2025-08-13
Payer: MEDICARE

## 2025-08-13 ENCOUNTER — LAB VISIT (OUTPATIENT)
Dept: LAB | Facility: HOSPITAL | Age: 55
End: 2025-08-13
Payer: MEDICARE

## 2025-08-13 VITALS
HEART RATE: 87 BPM | SYSTOLIC BLOOD PRESSURE: 106 MMHG | BODY MASS INDEX: 35.19 KG/M2 | WEIGHT: 218 LBS | DIASTOLIC BLOOD PRESSURE: 74 MMHG

## 2025-08-13 VITALS
WEIGHT: 218.25 LBS | DIASTOLIC BLOOD PRESSURE: 74 MMHG | SYSTOLIC BLOOD PRESSURE: 121 MMHG | BODY MASS INDEX: 35.23 KG/M2 | HEART RATE: 87 BPM

## 2025-08-13 DIAGNOSIS — M06.9 RHEUMATOID ARTHRITIS, INVOLVING UNSPECIFIED SITE, UNSPECIFIED WHETHER RHEUMATOID FACTOR PRESENT: ICD-10-CM

## 2025-08-13 DIAGNOSIS — M62.81 MUSCLE WEAKNESS: ICD-10-CM

## 2025-08-13 DIAGNOSIS — R56.9 SEIZURE: ICD-10-CM

## 2025-08-13 DIAGNOSIS — G40.109 FOCAL MOTOR SEIZURE: Primary | ICD-10-CM

## 2025-08-13 DIAGNOSIS — M85.9 LOW BONE DENSITY: ICD-10-CM

## 2025-08-13 DIAGNOSIS — M06.9 RHEUMATOID ARTHRITIS, INVOLVING UNSPECIFIED SITE, UNSPECIFIED WHETHER RHEUMATOID FACTOR PRESENT: Primary | ICD-10-CM

## 2025-08-13 DIAGNOSIS — M81.6 LOCALIZED OSTEOPOROSIS (LEQUESNE): ICD-10-CM

## 2025-08-13 DIAGNOSIS — G47.00 INSOMNIA, UNSPECIFIED TYPE: ICD-10-CM

## 2025-08-13 DIAGNOSIS — M06.012 RHEUMATOID ARTHRITIS INVOLVING BOTH SHOULDERS WITH NEGATIVE RHEUMATOID FACTOR: ICD-10-CM

## 2025-08-13 DIAGNOSIS — Z79.60 ENCOUNTER FOR MONITORING IMMUNOSUPPRESSIVE MEDICATION THERAPY CAUSING IMMUNODEFICIENCY: ICD-10-CM

## 2025-08-13 DIAGNOSIS — M06.011 RHEUMATOID ARTHRITIS INVOLVING BOTH SHOULDERS WITH NEGATIVE RHEUMATOID FACTOR: ICD-10-CM

## 2025-08-13 DIAGNOSIS — D84.821 ENCOUNTER FOR MONITORING IMMUNOSUPPRESSIVE MEDICATION THERAPY CAUSING IMMUNODEFICIENCY: ICD-10-CM

## 2025-08-13 DIAGNOSIS — F41.9 ANXIETY: ICD-10-CM

## 2025-08-13 DIAGNOSIS — Z51.81 ENCOUNTER FOR MONITORING IMMUNOSUPPRESSIVE MEDICATION THERAPY CAUSING IMMUNODEFICIENCY: ICD-10-CM

## 2025-08-13 DIAGNOSIS — J84.9 INTERSTITIAL PULMONARY DISEASE, UNSPECIFIED: ICD-10-CM

## 2025-08-13 DIAGNOSIS — Z12.5 PROSTATE CANCER SCREENING: ICD-10-CM

## 2025-08-13 DIAGNOSIS — G47.33 OBSTRUCTIVE SLEEP APNEA SYNDROME: ICD-10-CM

## 2025-08-13 LAB
ABSOLUTE EOSINOPHIL (OHS): 0.05 K/UL
ABSOLUTE MONOCYTE (OHS): 0.56 K/UL (ref 0.3–1)
ABSOLUTE NEUTROPHIL COUNT (OHS): 3.07 K/UL (ref 1.8–7.7)
ALBUMIN SERPL BCP-MCNC: 5.1 G/DL (ref 3.5–5.2)
ALDOLASE (OHS): 2.9 U/L (ref 1.2–7.6)
ALP SERPL-CCNC: 48 UNIT/L (ref 40–150)
ALT SERPL W/O P-5'-P-CCNC: 38 UNIT/L (ref 0–55)
ANION GAP (OHS): 11 MMOL/L (ref 8–16)
AST SERPL-CCNC: 43 UNIT/L (ref 0–50)
BASOPHILS # BLD AUTO: 0.04 K/UL
BASOPHILS NFR BLD AUTO: 0.8 %
BILIRUB SERPL-MCNC: 0.8 MG/DL (ref 0.1–1)
BUN SERPL-MCNC: 8 MG/DL (ref 6–20)
CALCIUM SERPL-MCNC: 9 MG/DL (ref 8.7–10.5)
CHLORIDE SERPL-SCNC: 100 MMOL/L (ref 95–110)
CK SERPL-CCNC: 333 U/L (ref 20–200)
CO2 SERPL-SCNC: 25 MMOL/L (ref 23–29)
CREAT SERPL-MCNC: 0.8 MG/DL (ref 0.5–1.4)
CRP SERPL-MCNC: <0.3 MG/L
ERYTHROCYTE [DISTWIDTH] IN BLOOD BY AUTOMATED COUNT: 13.2 % (ref 11.5–14.5)
ERYTHROCYTE [SEDIMENTATION RATE] IN BLOOD BY PHOTOMETRIC METHOD: <2 MM/HR
GFR SERPLBLD CREATININE-BSD FMLA CKD-EPI: >60 ML/MIN/1.73/M2
GLUCOSE SERPL-MCNC: 95 MG/DL (ref 70–110)
HCT VFR BLD AUTO: 44 % (ref 40–54)
HGB BLD-MCNC: 14.8 GM/DL (ref 14–18)
IMM GRANULOCYTES # BLD AUTO: 0.02 K/UL (ref 0–0.04)
IMM GRANULOCYTES NFR BLD AUTO: 0.4 % (ref 0–0.5)
LYMPHOCYTES # BLD AUTO: 1.43 K/UL (ref 1–4.8)
MCH RBC QN AUTO: 31.4 PG (ref 27–31)
MCHC RBC AUTO-ENTMCNC: 33.6 G/DL (ref 32–36)
MCV RBC AUTO: 93 FL (ref 82–98)
NUCLEATED RBC (/100WBC) (OHS): 0 /100 WBC
PLATELET # BLD AUTO: 183 K/UL (ref 150–450)
PMV BLD AUTO: 11.4 FL (ref 9.2–12.9)
POTASSIUM SERPL-SCNC: 3.7 MMOL/L (ref 3.5–5.1)
PROT SERPL-MCNC: 7.6 GM/DL (ref 6–8.4)
PSA SERPL-MCNC: 1.26 NG/ML
RBC # BLD AUTO: 4.72 M/UL (ref 4.6–6.2)
RELATIVE EOSINOPHIL (OHS): 1 %
RELATIVE LYMPHOCYTE (OHS): 27.7 % (ref 18–48)
RELATIVE MONOCYTE (OHS): 10.8 % (ref 4–15)
RELATIVE NEUTROPHIL (OHS): 59.3 % (ref 38–73)
SODIUM SERPL-SCNC: 136 MMOL/L (ref 136–145)
WBC # BLD AUTO: 5.17 K/UL (ref 3.9–12.7)

## 2025-08-13 PROCEDURE — 82550 ASSAY OF CK (CPK): CPT | Mod: TXP

## 2025-08-13 PROCEDURE — 85652 RBC SED RATE AUTOMATED: CPT | Mod: TXP

## 2025-08-13 PROCEDURE — 99999 PR PBB SHADOW E&M-EST. PATIENT-LVL III: CPT | Mod: PBBFAC,GC,TXP, | Performed by: STUDENT IN AN ORGANIZED HEALTH CARE EDUCATION/TRAINING PROGRAM

## 2025-08-13 PROCEDURE — 84153 ASSAY OF PSA TOTAL: CPT | Mod: TXP

## 2025-08-13 PROCEDURE — 82085 ASSAY OF ALDOLASE: CPT | Mod: TXP

## 2025-08-13 PROCEDURE — 99214 OFFICE O/P EST MOD 30 MIN: CPT | Mod: GC,NTX,S$GLB, | Performed by: INTERNAL MEDICINE

## 2025-08-13 PROCEDURE — 3074F SYST BP LT 130 MM HG: CPT | Mod: CPTII,GC,NTX,S$GLB | Performed by: INTERNAL MEDICINE

## 2025-08-13 PROCEDURE — 3078F DIAST BP <80 MM HG: CPT | Mod: CPTII,GC,NTX,S$GLB | Performed by: INTERNAL MEDICINE

## 2025-08-13 PROCEDURE — 36415 COLL VENOUS BLD VENIPUNCTURE: CPT | Mod: TXP

## 2025-08-13 PROCEDURE — 3008F BODY MASS INDEX DOCD: CPT | Mod: CPTII,GC,NTX,S$GLB | Performed by: INTERNAL MEDICINE

## 2025-08-13 PROCEDURE — 86140 C-REACTIVE PROTEIN: CPT | Mod: TXP

## 2025-08-13 PROCEDURE — 85025 COMPLETE CBC W/AUTO DIFF WBC: CPT | Mod: TXP

## 2025-08-13 PROCEDURE — 1159F MED LIST DOCD IN RCRD: CPT | Mod: CPTII,GC,NTX,S$GLB | Performed by: INTERNAL MEDICINE

## 2025-08-13 PROCEDURE — 80220 DRUG ASY HYDROXYCHLOROQUINE: CPT | Mod: TXP

## 2025-08-13 PROCEDURE — 80053 COMPREHEN METABOLIC PANEL: CPT | Mod: TXP

## 2025-08-13 PROCEDURE — 99999 PR PBB SHADOW E&M-EST. PATIENT-LVL III: CPT | Mod: PBBFAC,TXP,, | Performed by: STUDENT IN AN ORGANIZED HEALTH CARE EDUCATION/TRAINING PROGRAM

## 2025-08-13 PROCEDURE — 3044F HG A1C LEVEL LT 7.0%: CPT | Mod: CPTII,GC,NTX,S$GLB | Performed by: INTERNAL MEDICINE

## 2025-08-13 RX ORDER — MIDAZOLAM 5 MG/.1ML
5 SPRAY NASAL
Qty: 2 EACH | Refills: 1 | Status: SHIPPED | OUTPATIENT
Start: 2025-08-13 | End: 2025-11-11

## 2025-08-13 ASSESSMENT — ROUTINE ASSESSMENT OF PATIENT INDEX DATA (RAPID3)
WHEN YOU AWAKENED IN THE MORNING OVER THE LAST WEEK, PLEASE INDICATE THE AMOUNT OF TIME IT TAKES UNTIL YOU ARE AS LIMBER AS YOU WILL BE FOR THE DAY: 30 MINUTES
PSYCHOLOGICAL DISTRESS SCORE: 6.6
PATIENT GLOBAL ASSESSMENT SCORE: 5
AM STIFFNESS SCORE: 1, YES
FATIGUE SCORE: 5
PAIN SCORE: 7
TOTAL RAPID3 SCORE: 5.55
MDHAQ FUNCTION SCORE: 1.4

## 2025-08-19 ENCOUNTER — TELEPHONE (OUTPATIENT)
Dept: INTERNAL MEDICINE | Facility: CLINIC | Age: 55
End: 2025-08-19
Payer: MEDICARE

## 2025-08-20 LAB — OH-CHLOROQUINE SERPL-MCNC: 440 NG/ML

## 2025-08-22 DIAGNOSIS — M06.9 RHEUMATOID ARTHRITIS, INVOLVING UNSPECIFIED SITE, UNSPECIFIED WHETHER RHEUMATOID FACTOR PRESENT: Primary | ICD-10-CM

## 2025-08-22 DIAGNOSIS — J84.9 INTERSTITIAL PULMONARY DISEASE, UNSPECIFIED: ICD-10-CM
